# Patient Record
Sex: FEMALE | Race: WHITE | NOT HISPANIC OR LATINO | Employment: FULL TIME | RURAL
[De-identification: names, ages, dates, MRNs, and addresses within clinical notes are randomized per-mention and may not be internally consistent; named-entity substitution may affect disease eponyms.]

---

## 2018-04-13 LAB — PAP RECOMMENDATION EXT: NORMAL

## 2020-10-05 LAB — NONINV COLON CA DNA+OCC BLD SCRN STL QL: NEGATIVE

## 2020-10-07 ENCOUNTER — HISTORICAL (OUTPATIENT)
Dept: ADMINISTRATIVE | Facility: HOSPITAL | Age: 60
End: 2020-10-07

## 2020-10-07 LAB — HM MAMMOGRAM: NORMAL

## 2020-10-19 ENCOUNTER — HISTORICAL (OUTPATIENT)
Dept: ADMINISTRATIVE | Facility: HOSPITAL | Age: 60
End: 2020-10-19

## 2020-10-20 LAB
AMPHET UR QL SCN: POSITIVE
BARBITURATES UR QL SCN: NEGATIVE
BENZODIAZ METAB UR QL SCN: NEGATIVE
BUPRENORPHINE UR QL SCN: NEGATIVE
COCAINE UR QL SCN: NEGATIVE
MDA UR QL SCN: NEGATIVE
METHADONE UR QL SCN: NEGATIVE
METHAMPHET UR QL SCN: NEGATIVE
OPIATES UR QL SCN: NEGATIVE
OXYCODONE UR QL SCN: NEGATIVE
PCP UR QL SCN: NEGATIVE
TEMPERATURE, URINE: 94 DEGREES (ref 90–100)
THC UR QL SCN: NEGATIVE
VALIDITY TESTS, URINE: ABNORMAL

## 2021-02-23 ENCOUNTER — HISTORICAL (OUTPATIENT)
Dept: ADMINISTRATIVE | Facility: HOSPITAL | Age: 61
End: 2021-02-23

## 2021-05-14 VITALS
BODY MASS INDEX: 27.13 KG/M2 | HEIGHT: 63 IN | WEIGHT: 153.13 LBS | SYSTOLIC BLOOD PRESSURE: 112 MMHG | RESPIRATION RATE: 20 BRPM | HEART RATE: 95 BPM | DIASTOLIC BLOOD PRESSURE: 82 MMHG | TEMPERATURE: 97 F | OXYGEN SATURATION: 98 %

## 2021-05-14 RX ORDER — HYDROXYZINE PAMOATE 25 MG/1
25 CAPSULE ORAL 2 TIMES DAILY PRN
COMMUNITY
Start: 2016-02-04 | End: 2021-11-08 | Stop reason: SDUPTHER

## 2021-05-14 RX ORDER — DOXEPIN HYDROCHLORIDE 10 MG/1
10-20 CAPSULE ORAL NIGHTLY
COMMUNITY
Start: 2020-08-18 | End: 2021-11-08

## 2021-05-14 RX ORDER — TIZANIDINE 4 MG/1
4 TABLET ORAL NIGHTLY
COMMUNITY
Start: 2011-11-23 | End: 2021-11-08 | Stop reason: SDUPTHER

## 2021-05-14 RX ORDER — FLUTICASONE PROPIONATE 50 MCG
1 SPRAY, SUSPENSION (ML) NASAL 2 TIMES DAILY
COMMUNITY
Start: 2020-03-06 | End: 2022-05-18 | Stop reason: SDUPTHER

## 2021-05-14 RX ORDER — ESCITALOPRAM OXALATE 10 MG/1
10 TABLET ORAL DAILY
COMMUNITY
Start: 2016-02-04 | End: 2021-11-08 | Stop reason: SDUPTHER

## 2021-05-14 RX ORDER — CETIRIZINE HYDROCHLORIDE 10 MG/1
10 TABLET ORAL DAILY
COMMUNITY
Start: 2020-04-06 | End: 2022-05-18 | Stop reason: SDUPTHER

## 2021-05-14 RX ORDER — HYDROCHLOROTHIAZIDE 12.5 MG/1
12.5 TABLET ORAL DAILY
COMMUNITY
Start: 2019-06-18 | End: 2024-04-02

## 2021-05-14 RX ORDER — OMEPRAZOLE 20 MG/1
20 CAPSULE, DELAYED RELEASE ORAL 2 TIMES DAILY
COMMUNITY
Start: 2015-04-02 | End: 2021-11-08 | Stop reason: SDUPTHER

## 2021-05-14 RX ORDER — CELECOXIB 200 MG/1
200 CAPSULE ORAL DAILY
COMMUNITY
Start: 2017-07-11 | End: 2021-11-08 | Stop reason: SDUPTHER

## 2021-05-14 RX ORDER — SUMATRIPTAN SUCCINATE 100 MG/1
100 TABLET ORAL
COMMUNITY
Start: 2011-07-19 | End: 2021-11-08 | Stop reason: SDUPTHER

## 2021-05-14 RX ORDER — ALBUTEROL SULFATE 90 UG/1
2 AEROSOL, METERED RESPIRATORY (INHALATION) EVERY 6 HOURS PRN
COMMUNITY
Start: 2015-11-09

## 2021-06-09 ENCOUNTER — OFFICE VISIT (OUTPATIENT)
Dept: FAMILY MEDICINE | Facility: CLINIC | Age: 61
End: 2021-06-09
Payer: COMMERCIAL

## 2021-06-09 VITALS
TEMPERATURE: 97 F | OXYGEN SATURATION: 94 % | DIASTOLIC BLOOD PRESSURE: 70 MMHG | SYSTOLIC BLOOD PRESSURE: 122 MMHG | HEIGHT: 63 IN | RESPIRATION RATE: 22 BRPM | HEART RATE: 68 BPM | BODY MASS INDEX: 28.14 KG/M2 | WEIGHT: 158.81 LBS

## 2021-06-09 DIAGNOSIS — D22.9 ATYPICAL MOLE: Primary | ICD-10-CM

## 2021-06-09 DIAGNOSIS — R06.00 DYSPNEA, UNSPECIFIED TYPE: ICD-10-CM

## 2021-06-09 LAB
BASOPHILS # BLD AUTO: 0.03 K/UL (ref 0–0.2)
BASOPHILS NFR BLD AUTO: 0.5 % (ref 0–1)
DIFFERENTIAL METHOD BLD: ABNORMAL
EOSINOPHIL # BLD AUTO: 0.13 K/UL (ref 0–0.5)
EOSINOPHIL NFR BLD AUTO: 2.1 % (ref 1–4)
ERYTHROCYTE [DISTWIDTH] IN BLOOD BY AUTOMATED COUNT: 14.2 % (ref 11.5–14.5)
HCT VFR BLD AUTO: 40.1 % (ref 38–47)
HGB BLD-MCNC: 13 G/DL (ref 12–16)
IMM GRANULOCYTES # BLD AUTO: 0.01 K/UL (ref 0–0.04)
IMM GRANULOCYTES NFR BLD: 0.2 % (ref 0–0.4)
LYMPHOCYTES # BLD AUTO: 1.6 K/UL (ref 1–4.8)
LYMPHOCYTES NFR BLD AUTO: 25.9 % (ref 27–41)
MCH RBC QN AUTO: 28.2 PG (ref 27–31)
MCHC RBC AUTO-ENTMCNC: 32.4 G/DL (ref 32–36)
MCV RBC AUTO: 87 FL (ref 80–96)
MONOCYTES # BLD AUTO: 0.44 K/UL (ref 0–0.8)
MONOCYTES NFR BLD AUTO: 7.1 % (ref 2–6)
MPC BLD CALC-MCNC: 11.8 FL (ref 9.4–12.4)
NEUTROPHILS # BLD AUTO: 3.97 K/UL (ref 1.8–7.7)
NEUTROPHILS NFR BLD AUTO: 64.2 % (ref 53–65)
NRBC # BLD AUTO: 0 X10E3/UL
NRBC, AUTO (.00): 0 %
PLATELET # BLD AUTO: 230 K/UL (ref 150–400)
RBC # BLD AUTO: 4.61 M/UL (ref 4.2–5.4)
WBC # BLD AUTO: 6.18 K/UL (ref 4.5–11)

## 2021-06-09 PROCEDURE — 85025 CBC WITH DIFFERENTIAL: ICD-10-PCS | Mod: ,,, | Performed by: CLINICAL MEDICAL LABORATORY

## 2021-06-09 PROCEDURE — 84436 ASSAY OF TOTAL THYROXINE: CPT | Mod: ,,, | Performed by: CLINICAL MEDICAL LABORATORY

## 2021-06-09 PROCEDURE — 99214 PR OFFICE/OUTPT VISIT, EST, LEVL IV, 30-39 MIN: ICD-10-PCS | Mod: ,,, | Performed by: FAMILY MEDICINE

## 2021-06-09 PROCEDURE — 85025 COMPLETE CBC W/AUTO DIFF WBC: CPT | Mod: ,,, | Performed by: CLINICAL MEDICAL LABORATORY

## 2021-06-09 PROCEDURE — 93000 PR ELECTROCARDIOGRAM, COMPLETE: ICD-10-PCS | Mod: ,,, | Performed by: FAMILY MEDICINE

## 2021-06-09 PROCEDURE — 80061 LIPID PANEL: ICD-10-PCS | Mod: ,,, | Performed by: CLINICAL MEDICAL LABORATORY

## 2021-06-09 PROCEDURE — 3008F PR BODY MASS INDEX (BMI) DOCUMENTED: ICD-10-PCS | Mod: CPTII,,, | Performed by: FAMILY MEDICINE

## 2021-06-09 PROCEDURE — 84436 T4: ICD-10-PCS | Mod: ,,, | Performed by: CLINICAL MEDICAL LABORATORY

## 2021-06-09 PROCEDURE — 93000 ELECTROCARDIOGRAM COMPLETE: CPT | Mod: ,,, | Performed by: FAMILY MEDICINE

## 2021-06-09 PROCEDURE — 99214 OFFICE O/P EST MOD 30 MIN: CPT | Mod: ,,, | Performed by: FAMILY MEDICINE

## 2021-06-09 PROCEDURE — 80061 LIPID PANEL: CPT | Mod: ,,, | Performed by: CLINICAL MEDICAL LABORATORY

## 2021-06-09 PROCEDURE — 3008F BODY MASS INDEX DOCD: CPT | Mod: CPTII,,, | Performed by: FAMILY MEDICINE

## 2021-06-10 LAB
ALBUMIN SERPL BCP-MCNC: 4 G/DL (ref 3.5–5)
ALBUMIN/GLOB SERPL: 1.5 {RATIO}
ALP SERPL-CCNC: 147 U/L (ref 46–118)
ALT SERPL W P-5'-P-CCNC: 28 U/L (ref 13–56)
ANION GAP SERPL CALCULATED.3IONS-SCNC: 11 MMOL/L (ref 7–16)
AST SERPL W P-5'-P-CCNC: 21 U/L (ref 15–37)
BILIRUB SERPL-MCNC: 0.3 MG/DL (ref 0–1.2)
BUN SERPL-MCNC: 11 MG/DL (ref 7–18)
BUN/CREAT SERPL: 10 (ref 6–20)
CALCIUM SERPL-MCNC: 9.1 MG/DL (ref 8.5–10.1)
CHLORIDE SERPL-SCNC: 106 MMOL/L (ref 98–107)
CHOLEST SERPL-MCNC: 199 MG/DL (ref 0–200)
CHOLEST/HDLC SERPL: 3 {RATIO}
CO2 SERPL-SCNC: 28 MMOL/L (ref 21–32)
CREAT SERPL-MCNC: 1.11 MG/DL (ref 0.55–1.02)
GLOBULIN SER-MCNC: 2.6 G/DL (ref 2–4)
GLUCOSE SERPL-MCNC: 77 MG/DL (ref 74–106)
HDLC SERPL-MCNC: 67 MG/DL (ref 40–60)
LDLC SERPL CALC-MCNC: 93 MG/DL
LDLC/HDLC SERPL: 1.4 {RATIO}
NONHDLC SERPL-MCNC: 132 MG/DL
POTASSIUM SERPL-SCNC: 4.3 MMOL/L (ref 3.5–5.1)
PROT SERPL-MCNC: 6.6 G/DL (ref 6.4–8.2)
SODIUM SERPL-SCNC: 141 MMOL/L (ref 136–145)
T4 SERPL-MCNC: 6.8 ΜG/DL (ref 4.8–13.9)
TRIGL SERPL-MCNC: 196 MG/DL (ref 35–150)
TSH SERPL DL<=0.005 MIU/L-ACNC: 1.62 UIU/ML (ref 0.36–3.74)
VLDLC SERPL-MCNC: 39 MG/DL

## 2021-06-24 ENCOUNTER — OFFICE VISIT (OUTPATIENT)
Dept: FAMILY MEDICINE | Facility: CLINIC | Age: 61
End: 2021-06-24
Payer: COMMERCIAL

## 2021-06-24 VITALS
OXYGEN SATURATION: 94 % | HEIGHT: 63 IN | HEART RATE: 72 BPM | DIASTOLIC BLOOD PRESSURE: 72 MMHG | SYSTOLIC BLOOD PRESSURE: 122 MMHG | WEIGHT: 158 LBS | TEMPERATURE: 97 F | BODY MASS INDEX: 28 KG/M2 | RESPIRATION RATE: 22 BRPM

## 2021-06-24 DIAGNOSIS — R06.00 DYSPNEA, UNSPECIFIED TYPE: Primary | ICD-10-CM

## 2021-06-24 DIAGNOSIS — R07.9 CHEST PAIN, UNSPECIFIED TYPE: ICD-10-CM

## 2021-06-24 PROCEDURE — 3008F BODY MASS INDEX DOCD: CPT | Mod: CPTII,,, | Performed by: FAMILY MEDICINE

## 2021-06-24 PROCEDURE — 99215 PR OFFICE/OUTPT VISIT, EST, LEVL V, 40-54 MIN: ICD-10-PCS | Mod: ,,, | Performed by: FAMILY MEDICINE

## 2021-06-24 PROCEDURE — 99215 OFFICE O/P EST HI 40 MIN: CPT | Mod: ,,, | Performed by: FAMILY MEDICINE

## 2021-06-24 PROCEDURE — 93000 PR ELECTROCARDIOGRAM, COMPLETE: ICD-10-PCS | Mod: ,,, | Performed by: FAMILY MEDICINE

## 2021-06-24 PROCEDURE — 3008F PR BODY MASS INDEX (BMI) DOCUMENTED: ICD-10-PCS | Mod: CPTII,,, | Performed by: FAMILY MEDICINE

## 2021-06-24 PROCEDURE — 93000 ELECTROCARDIOGRAM COMPLETE: CPT | Mod: ,,, | Performed by: FAMILY MEDICINE

## 2021-06-24 RX ORDER — METOPROLOL TARTRATE 25 MG/1
25 TABLET, FILM COATED ORAL DAILY
Qty: 30 TABLET | Refills: 11 | Status: SHIPPED | OUTPATIENT
Start: 2021-06-24 | End: 2021-11-08

## 2021-06-24 RX ORDER — BUDESONIDE, GLYCOPYRROLATE, AND FORMOTEROL FUMARATE 160; 9; 4.8 UG/1; UG/1; UG/1
2 AEROSOL, METERED RESPIRATORY (INHALATION) DAILY
COMMUNITY
End: 2021-11-08 | Stop reason: SDUPTHER

## 2021-06-25 PROBLEM — R07.9 CHEST PAIN: Status: ACTIVE | Noted: 2021-06-25

## 2021-07-02 ENCOUNTER — OFFICE VISIT (OUTPATIENT)
Dept: CARDIOLOGY | Facility: CLINIC | Age: 61
End: 2021-07-02
Payer: COMMERCIAL

## 2021-07-02 VITALS
BODY MASS INDEX: 28.35 KG/M2 | OXYGEN SATURATION: 95 % | SYSTOLIC BLOOD PRESSURE: 100 MMHG | HEIGHT: 63 IN | WEIGHT: 160 LBS | DIASTOLIC BLOOD PRESSURE: 60 MMHG | HEART RATE: 62 BPM

## 2021-07-02 DIAGNOSIS — R06.00 DYSPNEA, UNSPECIFIED TYPE: ICD-10-CM

## 2021-07-02 DIAGNOSIS — R06.02 SHORTNESS OF BREATH: Primary | ICD-10-CM

## 2021-07-02 DIAGNOSIS — R07.9 CHEST PAIN, UNSPECIFIED TYPE: ICD-10-CM

## 2021-07-02 PROCEDURE — 99204 PR OFFICE/OUTPT VISIT, NEW, LEVL IV, 45-59 MIN: ICD-10-PCS | Mod: S$PBB,,, | Performed by: INTERNAL MEDICINE

## 2021-07-02 PROCEDURE — 99204 OFFICE O/P NEW MOD 45 MIN: CPT | Mod: S$PBB,,, | Performed by: INTERNAL MEDICINE

## 2021-07-02 PROCEDURE — 3008F BODY MASS INDEX DOCD: CPT | Mod: CPTII,,, | Performed by: INTERNAL MEDICINE

## 2021-07-02 PROCEDURE — 99215 OFFICE O/P EST HI 40 MIN: CPT | Mod: PBBFAC | Performed by: INTERNAL MEDICINE

## 2021-07-02 PROCEDURE — 3008F PR BODY MASS INDEX (BMI) DOCUMENTED: ICD-10-PCS | Mod: CPTII,,, | Performed by: INTERNAL MEDICINE

## 2021-07-09 ENCOUNTER — OFFICE VISIT (OUTPATIENT)
Dept: FAMILY MEDICINE | Facility: CLINIC | Age: 61
End: 2021-07-09
Payer: COMMERCIAL

## 2021-07-09 VITALS
OXYGEN SATURATION: 95 % | HEIGHT: 63 IN | SYSTOLIC BLOOD PRESSURE: 112 MMHG | TEMPERATURE: 97 F | BODY MASS INDEX: 28 KG/M2 | WEIGHT: 158 LBS | RESPIRATION RATE: 20 BRPM | HEART RATE: 84 BPM | DIASTOLIC BLOOD PRESSURE: 88 MMHG

## 2021-07-09 DIAGNOSIS — S69.91XA FISH HOOK INJURY OF FINGER OF RIGHT HAND, INITIAL ENCOUNTER: ICD-10-CM

## 2021-07-09 DIAGNOSIS — R11.0 NAUSEA: Primary | ICD-10-CM

## 2021-07-09 PROCEDURE — 90715 TDAP VACCINE GREATER THAN OR EQUAL TO 7YO IM: ICD-10-PCS | Mod: ,,, | Performed by: FAMILY MEDICINE

## 2021-07-09 PROCEDURE — 3008F PR BODY MASS INDEX (BMI) DOCUMENTED: ICD-10-PCS | Mod: CPTII,,, | Performed by: FAMILY MEDICINE

## 2021-07-09 PROCEDURE — 1125F PR PAIN SEVERITY QUANTIFIED, PAIN PRESENT: ICD-10-PCS | Mod: ,,, | Performed by: FAMILY MEDICINE

## 2021-07-09 PROCEDURE — 96372 PR INJECTION,THERAP/PROPH/DIAG2ST, IM OR SUBCUT: ICD-10-PCS | Mod: 59,,, | Performed by: FAMILY MEDICINE

## 2021-07-09 PROCEDURE — 99213 PR OFFICE/OUTPT VISIT, EST, LEVL III, 20-29 MIN: ICD-10-PCS | Mod: 25,,, | Performed by: FAMILY MEDICINE

## 2021-07-09 PROCEDURE — 1125F AMNT PAIN NOTED PAIN PRSNT: CPT | Mod: ,,, | Performed by: FAMILY MEDICINE

## 2021-07-09 PROCEDURE — 96372 THER/PROPH/DIAG INJ SC/IM: CPT | Mod: 59,,, | Performed by: FAMILY MEDICINE

## 2021-07-09 PROCEDURE — 90715 TDAP VACCINE 7 YRS/> IM: CPT | Mod: ,,, | Performed by: FAMILY MEDICINE

## 2021-07-09 PROCEDURE — 90471 TDAP VACCINE GREATER THAN OR EQUAL TO 7YO IM: ICD-10-PCS | Mod: ,,, | Performed by: FAMILY MEDICINE

## 2021-07-09 PROCEDURE — 3008F BODY MASS INDEX DOCD: CPT | Mod: CPTII,,, | Performed by: FAMILY MEDICINE

## 2021-07-09 PROCEDURE — 90471 IMMUNIZATION ADMIN: CPT | Mod: ,,, | Performed by: FAMILY MEDICINE

## 2021-07-09 PROCEDURE — 99213 OFFICE O/P EST LOW 20 MIN: CPT | Mod: 25,,, | Performed by: FAMILY MEDICINE

## 2021-07-09 RX ORDER — ONDANSETRON 2 MG/ML
4 INJECTION INTRAMUSCULAR; INTRAVENOUS
Status: DISCONTINUED | OUTPATIENT
Start: 2021-07-09 | End: 2021-07-09

## 2021-07-09 RX ORDER — ONDANSETRON 2 MG/ML
4 INJECTION INTRAMUSCULAR; INTRAVENOUS
Status: COMPLETED | OUTPATIENT
Start: 2021-07-09 | End: 2021-07-09

## 2021-07-09 RX ORDER — CLINDAMYCIN HYDROCHLORIDE 300 MG/1
300 CAPSULE ORAL EVERY 8 HOURS
Qty: 21 CAPSULE | Refills: 0 | Status: ON HOLD | OUTPATIENT
Start: 2021-07-09 | End: 2021-09-17

## 2021-07-09 RX ADMIN — ONDANSETRON 4 MG: 2 INJECTION INTRAMUSCULAR; INTRAVENOUS at 12:07

## 2021-07-12 ENCOUNTER — OFFICE VISIT (OUTPATIENT)
Dept: DERMATOLOGY | Facility: CLINIC | Age: 61
End: 2021-07-12
Payer: COMMERCIAL

## 2021-07-12 VITALS — WEIGHT: 158.06 LBS | BODY MASS INDEX: 28 KG/M2 | HEIGHT: 63 IN | RESPIRATION RATE: 16 BRPM

## 2021-07-12 DIAGNOSIS — L57.0 ACTINIC KERATOSIS: ICD-10-CM

## 2021-07-12 DIAGNOSIS — D48.9 NEOPLASM OF UNCERTAIN BEHAVIOR: Primary | ICD-10-CM

## 2021-07-12 DIAGNOSIS — D22.9 BENIGN NEVUS: ICD-10-CM

## 2021-07-12 PROCEDURE — 3008F PR BODY MASS INDEX (BMI) DOCUMENTED: ICD-10-PCS | Mod: CPTII,,, | Performed by: STUDENT IN AN ORGANIZED HEALTH CARE EDUCATION/TRAINING PROGRAM

## 2021-07-12 PROCEDURE — 99203 OFFICE O/P NEW LOW 30 MIN: CPT | Mod: 25,,, | Performed by: STUDENT IN AN ORGANIZED HEALTH CARE EDUCATION/TRAINING PROGRAM

## 2021-07-12 PROCEDURE — 11102 TANGNTL BX SKIN SINGLE LES: CPT | Mod: ,,, | Performed by: STUDENT IN AN ORGANIZED HEALTH CARE EDUCATION/TRAINING PROGRAM

## 2021-07-12 PROCEDURE — 88305 TISSUE EXAM BY PATHOLOGIST: CPT | Mod: SUR | Performed by: STUDENT IN AN ORGANIZED HEALTH CARE EDUCATION/TRAINING PROGRAM

## 2021-07-12 PROCEDURE — 17000 DESTRUCT PREMALG LESION: CPT | Mod: XU,51,, | Performed by: STUDENT IN AN ORGANIZED HEALTH CARE EDUCATION/TRAINING PROGRAM

## 2021-07-12 PROCEDURE — 17000 PR DESTRUCTION(LASER SURGERY,CRYOSURGERY,CHEMOSURGERY),PREMALIGNANT LESIONS,FIRST LESION: ICD-10-PCS | Mod: XU,51,, | Performed by: STUDENT IN AN ORGANIZED HEALTH CARE EDUCATION/TRAINING PROGRAM

## 2021-07-12 PROCEDURE — 88305 TISSUE EXAM BY PATHOLOGIST: CPT | Mod: 26,,, | Performed by: PATHOLOGY

## 2021-07-12 PROCEDURE — 88342 IMHCHEM/IMCYTCHM 1ST ANTB: CPT | Mod: 26,,, | Performed by: PATHOLOGY

## 2021-07-12 PROCEDURE — 11102 PR TANGENTIAL BIOPSY, SKIN, SINGLE LESION: ICD-10-PCS | Mod: ,,, | Performed by: STUDENT IN AN ORGANIZED HEALTH CARE EDUCATION/TRAINING PROGRAM

## 2021-07-12 PROCEDURE — 3008F BODY MASS INDEX DOCD: CPT | Mod: CPTII,,, | Performed by: STUDENT IN AN ORGANIZED HEALTH CARE EDUCATION/TRAINING PROGRAM

## 2021-07-12 PROCEDURE — 99203 PR OFFICE/OUTPT VISIT, NEW, LEVL III, 30-44 MIN: ICD-10-PCS | Mod: 25,,, | Performed by: STUDENT IN AN ORGANIZED HEALTH CARE EDUCATION/TRAINING PROGRAM

## 2021-07-12 PROCEDURE — 88305 PATHOLOGY, DERMATOLOGY: ICD-10-PCS | Mod: 26,,, | Performed by: PATHOLOGY

## 2021-07-12 PROCEDURE — 88342 PATHOLOGY, DERMATOLOGY: ICD-10-PCS | Mod: 26,,, | Performed by: PATHOLOGY

## 2021-07-15 ENCOUNTER — HOSPITAL ENCOUNTER (OUTPATIENT)
Dept: RADIOLOGY | Facility: HOSPITAL | Age: 61
Discharge: HOME | End: 2021-07-15
Attending: INTERNAL MEDICINE
Payer: COMMERCIAL

## 2021-07-15 ENCOUNTER — HOSPITAL ENCOUNTER (OUTPATIENT)
Dept: CARDIOLOGY | Facility: HOSPITAL | Age: 61
Discharge: HOME | End: 2021-07-15
Attending: INTERNAL MEDICINE
Payer: COMMERCIAL

## 2021-07-15 VITALS — WEIGHT: 158 LBS | BODY MASS INDEX: 28.44 KG/M2

## 2021-07-15 DIAGNOSIS — R07.9 CHEST PAIN, UNSPECIFIED TYPE: ICD-10-CM

## 2021-07-15 DIAGNOSIS — R06.02 SHORTNESS OF BREATH: ICD-10-CM

## 2021-07-15 LAB
AORTIC ROOT ANNULUS: 2.6 CM
AORTIC VALVE CUSP SEPERATION: 2.15 CM
AV INDEX (PROSTH): 0.67
AV MEAN GRADIENT: 2 MMHG
AV PEAK GRADIENT: 3 MMHG
AV VALVE AREA: 2.09 CM2
AV VELOCITY RATIO: 1
CV ECHO LV RWT: 0.52 CM
CV STRESS BASE HR: 61 BPM
DHEA SERPL-MCNC: NORMAL
DIASTOLIC BLOOD PRESSURE: 85 MMHG
DOP CALC AO PEAK VEL: 0.8 M/S
DOP CALC AO VTI: 18 CM
DOP CALC LVOT AREA: 3.1 CM2
DOP CALC LVOT DIAMETER: 2 CM
DOP CALC LVOT PEAK VEL: 0.8 M/S
DOP CALC LVOT STROKE VOLUME: 37.68 CM3
DOP CALCLVOT PEAK VEL VTI: 12 CM
E WAVE DECELERATION TIME: 183 MSEC
ECHO EF ESTIMATED: 60 %
ECHO LV POSTERIOR WALL: 1.11 CM (ref 0.6–1.1)
EJECTION FRACTION: 60 %
ESTROGEN SERPL-MCNC: NORMAL PG/ML
FRACTIONAL SHORTENING: 31 % (ref 28–44)
INTERVENTRICULAR SEPTUM: 0.98 CM (ref 0.6–1.1)
IVC OSTIUM: 0.6 CM
LAB AP GROSS DESCRIPTION: NORMAL
LAB AP LABORATORY NOTES: NORMAL
LAB AP SPEC A DDX: NORMAL
LAB AP SPEC A MORPHOLOGY: NORMAL
LAB AP SPEC A PROCEDURE: NORMAL
LEFT ATRIUM SIZE: 3.4 CM
LEFT INTERNAL DIMENSION IN SYSTOLE: 2.94 CM (ref 2.1–4)
LEFT VENTRICULAR INTERNAL DIMENSION IN DIASTOLE: 4.27 CM (ref 3.5–6)
LEFT VENTRICULAR MASS: 149.86 G
LVOT MG: 1 MMHG
MV PEAK E VEL: 0.5 M/S
OHS CV CPX 1 MINUTE RECOVERY HEART RATE: 92 BPM
OHS CV CPX 85 PERCENT MAX PREDICTED HEART RATE MALE: 131
OHS CV CPX ESTIMATED METS: 7
OHS CV CPX MAX PREDICTED HEART RATE: 154
OHS CV CPX PATIENT IS FEMALE: 1
OHS CV CPX PATIENT IS MALE: 0
OHS CV CPX PEAK DIASTOLIC BLOOD PRESSURE: 101 MMHG
OHS CV CPX PEAK HEAR RATE: 141 BPM
OHS CV CPX PEAK RATE PRESSURE PRODUCT: NORMAL
OHS CV CPX PEAK SYSTOLIC BLOOD PRESSURE: 176 MMHG
OHS CV CPX PERCENT MAX PREDICTED HEART RATE ACHIEVED: 92
OHS CV CPX RATE PRESSURE PRODUCT PRESENTING: 8357
PISA TR MAX VEL: 2.5 M/S
RA MAJOR: 3.9 CM
RA PRESSURE: 3 MMHG
RIGHT VENTRICULAR END-DIASTOLIC DIMENSION: 3.5 CM
STRESS ECHO POST EXERCISE DUR MIN: 5 MINUTES
STRESS ECHO POST EXERCISE DUR SEC: 18 SECONDS
SYSTOLIC BLOOD PRESSURE: 137 MMHG
T3RU NFR SERPL: NORMAL %
TR MAX PG: 25 MMHG
TRICUSPID ANNULAR PLANE SYSTOLIC EXCURSION: 2.2 CM
TV REST PULMONARY ARTERY PRESSURE: 28 MMHG

## 2021-07-15 PROCEDURE — 93018 NUCLEAR STRESS TEST (CUPID ONLY): ICD-10-PCS | Mod: ,,, | Performed by: INTERNAL MEDICINE

## 2021-07-15 PROCEDURE — 93016 NUCLEAR STRESS TEST (CUPID ONLY): ICD-10-PCS | Mod: ,,, | Performed by: NURSE PRACTITIONER

## 2021-07-15 PROCEDURE — 93306 TTE W/DOPPLER COMPLETE: CPT | Mod: 26,,, | Performed by: INTERNAL MEDICINE

## 2021-07-15 PROCEDURE — 78452 HT MUSCLE IMAGE SPECT MULT: CPT | Mod: TC

## 2021-07-15 PROCEDURE — 93016 CV STRESS TEST SUPVJ ONLY: CPT | Mod: ,,, | Performed by: NURSE PRACTITIONER

## 2021-07-15 PROCEDURE — 93306 ECHO (CUPID ONLY): ICD-10-PCS | Mod: 26,,, | Performed by: INTERNAL MEDICINE

## 2021-07-15 PROCEDURE — 93306 TTE W/DOPPLER COMPLETE: CPT

## 2021-07-15 PROCEDURE — A9500 TC99M SESTAMIBI: HCPCS

## 2021-07-15 PROCEDURE — 93017 CV STRESS TEST TRACING ONLY: CPT

## 2021-07-15 PROCEDURE — 93018 CV STRESS TEST I&R ONLY: CPT | Mod: ,,, | Performed by: INTERNAL MEDICINE

## 2021-07-15 PROCEDURE — 78452 NM MYOCARDIAL PERFUSION SPECT MULTI STUDY: ICD-10-PCS | Mod: 26,,, | Performed by: INTERNAL MEDICINE

## 2021-07-15 PROCEDURE — 78452 HT MUSCLE IMAGE SPECT MULT: CPT | Mod: 26,,, | Performed by: INTERNAL MEDICINE

## 2021-07-20 ENCOUNTER — CLINICAL SUPPORT (OUTPATIENT)
Dept: PULMONOLOGY | Facility: HOSPITAL | Age: 61
End: 2021-07-20
Attending: INTERNAL MEDICINE
Payer: COMMERCIAL

## 2021-07-20 DIAGNOSIS — R06.00 DYSPNEA, UNSPECIFIED TYPE: ICD-10-CM

## 2021-07-20 PROCEDURE — 94726 PULM FUNCT TST PLETHYSMOGRAP: ICD-10-PCS | Mod: 26,S$PBB,, | Performed by: INTERNAL MEDICINE

## 2021-07-20 PROCEDURE — 94060 PR EVAL OF BRONCHOSPASM: ICD-10-PCS | Mod: 26,S$PBB,, | Performed by: INTERNAL MEDICINE

## 2021-07-20 PROCEDURE — 94726 PLETHYSMOGRAPHY LUNG VOLUMES: CPT

## 2021-07-20 PROCEDURE — 94729 DIFFUSING CAPACITY: CPT | Mod: 26,S$PBB,, | Performed by: INTERNAL MEDICINE

## 2021-07-20 PROCEDURE — 94060 EVALUATION OF WHEEZING: CPT

## 2021-07-20 PROCEDURE — 94060 EVALUATION OF WHEEZING: CPT | Mod: 26,S$PBB,, | Performed by: INTERNAL MEDICINE

## 2021-07-20 PROCEDURE — 94726 PLETHYSMOGRAPHY LUNG VOLUMES: CPT | Mod: 26,S$PBB,, | Performed by: INTERNAL MEDICINE

## 2021-07-20 PROCEDURE — 94729 DIFFUSING CAPACITY: CPT

## 2021-07-20 PROCEDURE — 94729 PR C02/MEMBANE DIFFUSE CAPACITY: ICD-10-PCS | Mod: 26,S$PBB,, | Performed by: INTERNAL MEDICINE

## 2021-07-27 LAB
EKG 12-LEAD: ABNORMAL
PR INTERVAL: ABNORMAL
PRT AXES: ABNORMAL
QRS DURATION: ABNORMAL
QT/QTC: ABNORMAL
VENTRICULAR RATE: ABNORMAL

## 2021-08-02 LAB
EKG 12-LEAD: NORMAL
PR INTERVAL: NORMAL
PRT AXES: NORMAL
QRS DURATION: NORMAL
QT/QTC: NORMAL
VENTRICULAR RATE: NORMAL

## 2021-08-04 ENCOUNTER — OFFICE VISIT (OUTPATIENT)
Dept: FAMILY MEDICINE | Facility: CLINIC | Age: 61
End: 2021-08-04
Payer: COMMERCIAL

## 2021-08-04 VITALS
HEART RATE: 79 BPM | OXYGEN SATURATION: 95 % | BODY MASS INDEX: 28.32 KG/M2 | RESPIRATION RATE: 18 BRPM | TEMPERATURE: 98 F | HEIGHT: 63 IN | SYSTOLIC BLOOD PRESSURE: 114 MMHG | WEIGHT: 159.81 LBS | DIASTOLIC BLOOD PRESSURE: 82 MMHG

## 2021-08-04 DIAGNOSIS — R06.02 SOB (SHORTNESS OF BREATH): Primary | ICD-10-CM

## 2021-08-04 PROCEDURE — 3074F PR MOST RECENT SYSTOLIC BLOOD PRESSURE < 130 MM HG: ICD-10-PCS | Mod: CPTII,,, | Performed by: FAMILY MEDICINE

## 2021-08-04 PROCEDURE — 99214 OFFICE O/P EST MOD 30 MIN: CPT | Mod: ,,, | Performed by: FAMILY MEDICINE

## 2021-08-04 PROCEDURE — 3008F PR BODY MASS INDEX (BMI) DOCUMENTED: ICD-10-PCS | Mod: CPTII,,, | Performed by: FAMILY MEDICINE

## 2021-08-04 PROCEDURE — 3079F PR MOST RECENT DIASTOLIC BLOOD PRESSURE 80-89 MM HG: ICD-10-PCS | Mod: CPTII,,, | Performed by: FAMILY MEDICINE

## 2021-08-04 PROCEDURE — 1159F PR MEDICATION LIST DOCUMENTED IN MEDICAL RECORD: ICD-10-PCS | Mod: CPTII,,, | Performed by: FAMILY MEDICINE

## 2021-08-04 PROCEDURE — 3074F SYST BP LT 130 MM HG: CPT | Mod: CPTII,,, | Performed by: FAMILY MEDICINE

## 2021-08-04 PROCEDURE — 1159F MED LIST DOCD IN RCRD: CPT | Mod: CPTII,,, | Performed by: FAMILY MEDICINE

## 2021-08-04 PROCEDURE — 3008F BODY MASS INDEX DOCD: CPT | Mod: CPTII,,, | Performed by: FAMILY MEDICINE

## 2021-08-04 PROCEDURE — 99214 PR OFFICE/OUTPT VISIT, EST, LEVL IV, 30-39 MIN: ICD-10-PCS | Mod: ,,, | Performed by: FAMILY MEDICINE

## 2021-08-04 PROCEDURE — 3079F DIAST BP 80-89 MM HG: CPT | Mod: CPTII,,, | Performed by: FAMILY MEDICINE

## 2021-08-27 ENCOUNTER — OFFICE VISIT (OUTPATIENT)
Dept: CARDIOLOGY | Facility: CLINIC | Age: 61
End: 2021-08-27
Payer: COMMERCIAL

## 2021-08-27 VITALS
DIASTOLIC BLOOD PRESSURE: 70 MMHG | HEIGHT: 62 IN | BODY MASS INDEX: 29.63 KG/M2 | HEART RATE: 63 BPM | OXYGEN SATURATION: 98 % | SYSTOLIC BLOOD PRESSURE: 106 MMHG | WEIGHT: 161 LBS

## 2021-08-27 DIAGNOSIS — Z01.812 ENCOUNTER FOR PREPROCEDURAL LABORATORY EXAMINATION: ICD-10-CM

## 2021-08-27 DIAGNOSIS — R06.02 SOB (SHORTNESS OF BREATH): ICD-10-CM

## 2021-08-27 DIAGNOSIS — R07.9 CHEST PAIN, UNSPECIFIED TYPE: Primary | ICD-10-CM

## 2021-08-27 PROCEDURE — 3078F DIAST BP <80 MM HG: CPT | Mod: CPTII,,, | Performed by: INTERNAL MEDICINE

## 2021-08-27 PROCEDURE — 99214 OFFICE O/P EST MOD 30 MIN: CPT | Mod: S$PBB,,, | Performed by: INTERNAL MEDICINE

## 2021-08-27 PROCEDURE — 3008F PR BODY MASS INDEX (BMI) DOCUMENTED: ICD-10-PCS | Mod: CPTII,,, | Performed by: INTERNAL MEDICINE

## 2021-08-27 PROCEDURE — 99214 OFFICE O/P EST MOD 30 MIN: CPT | Mod: PBBFAC | Performed by: INTERNAL MEDICINE

## 2021-08-27 PROCEDURE — 3008F BODY MASS INDEX DOCD: CPT | Mod: CPTII,,, | Performed by: INTERNAL MEDICINE

## 2021-08-27 PROCEDURE — 3078F PR MOST RECENT DIASTOLIC BLOOD PRESSURE < 80 MM HG: ICD-10-PCS | Mod: CPTII,,, | Performed by: INTERNAL MEDICINE

## 2021-08-27 PROCEDURE — 1160F PR REVIEW ALL MEDS BY PRESCRIBER/CLIN PHARMACIST DOCUMENTED: ICD-10-PCS | Mod: CPTII,,, | Performed by: INTERNAL MEDICINE

## 2021-08-27 PROCEDURE — 3074F PR MOST RECENT SYSTOLIC BLOOD PRESSURE < 130 MM HG: ICD-10-PCS | Mod: CPTII,,, | Performed by: INTERNAL MEDICINE

## 2021-08-27 PROCEDURE — 1159F MED LIST DOCD IN RCRD: CPT | Mod: CPTII,,, | Performed by: INTERNAL MEDICINE

## 2021-08-27 PROCEDURE — 1160F RVW MEDS BY RX/DR IN RCRD: CPT | Mod: CPTII,,, | Performed by: INTERNAL MEDICINE

## 2021-08-27 PROCEDURE — 3074F SYST BP LT 130 MM HG: CPT | Mod: CPTII,,, | Performed by: INTERNAL MEDICINE

## 2021-08-27 PROCEDURE — 1159F PR MEDICATION LIST DOCUMENTED IN MEDICAL RECORD: ICD-10-PCS | Mod: CPTII,,, | Performed by: INTERNAL MEDICINE

## 2021-08-27 PROCEDURE — 99214 PR OFFICE/OUTPT VISIT, EST, LEVL IV, 30-39 MIN: ICD-10-PCS | Mod: S$PBB,,, | Performed by: INTERNAL MEDICINE

## 2021-09-09 DIAGNOSIS — R07.9 CHEST PAIN, UNSPECIFIED TYPE: Primary | ICD-10-CM

## 2021-09-09 DIAGNOSIS — R06.02 SHORTNESS OF BREATH: ICD-10-CM

## 2021-09-09 RX ORDER — SODIUM CHLORIDE 0.9 % (FLUSH) 0.9 %
10 SYRINGE (ML) INJECTION
Status: CANCELLED | OUTPATIENT
Start: 2021-09-17

## 2021-09-09 RX ORDER — SODIUM CHLORIDE 9 MG/ML
INJECTION, SOLUTION INTRAVENOUS ONCE
Status: CANCELLED | OUTPATIENT
Start: 2021-09-17

## 2021-09-17 ENCOUNTER — HOSPITAL ENCOUNTER (OUTPATIENT)
Facility: HOSPITAL | Age: 61
Discharge: HOME | End: 2021-09-17
Attending: INTERNAL MEDICINE | Admitting: INTERNAL MEDICINE
Payer: COMMERCIAL

## 2021-09-17 VITALS
TEMPERATURE: 98 F | WEIGHT: 160 LBS | HEART RATE: 70 BPM | BODY MASS INDEX: 29.26 KG/M2 | OXYGEN SATURATION: 93 % | SYSTOLIC BLOOD PRESSURE: 126 MMHG | RESPIRATION RATE: 16 BRPM | DIASTOLIC BLOOD PRESSURE: 79 MMHG

## 2021-09-17 DIAGNOSIS — R07.9 CHEST PAIN, UNSPECIFIED TYPE: ICD-10-CM

## 2021-09-17 DIAGNOSIS — R06.02 SHORTNESS OF BREATH: ICD-10-CM

## 2021-09-17 PROCEDURE — C1887 CATHETER, GUIDING: HCPCS | Performed by: INTERNAL MEDICINE

## 2021-09-17 PROCEDURE — 25500020 PHARM REV CODE 255: Performed by: INTERNAL MEDICINE

## 2021-09-17 PROCEDURE — 25000003 PHARM REV CODE 250: Performed by: INTERNAL MEDICINE

## 2021-09-17 PROCEDURE — 93454 CORONARY ARTERY ANGIO S&I: CPT | Mod: 26,,, | Performed by: INTERNAL MEDICINE

## 2021-09-17 PROCEDURE — 93010 ELECTROCARDIOGRAM REPORT: CPT | Mod: 59,,, | Performed by: INTERNAL MEDICINE

## 2021-09-17 PROCEDURE — 93454 PR CATH PLACE/CORONARY ANGIO, IMG SUPER/INTERP: ICD-10-PCS | Mod: 26,,, | Performed by: INTERNAL MEDICINE

## 2021-09-17 PROCEDURE — 63600175 PHARM REV CODE 636 W HCPCS: Performed by: INTERNAL MEDICINE

## 2021-09-17 PROCEDURE — 27000080 OPTIME MED/SURG SUP & DEVICES GENERAL CLASSIFICATION: Performed by: INTERNAL MEDICINE

## 2021-09-17 PROCEDURE — 27201423 OPTIME MED/SURG SUP & DEVICES STERILE SUPPLY: Performed by: INTERNAL MEDICINE

## 2021-09-17 PROCEDURE — 93005 ELECTROCARDIOGRAM TRACING: CPT | Mod: 59

## 2021-09-17 PROCEDURE — 27100168 OPTIME MED/SURG SUP & DEVICES NON-STERILE SUPPLY: Performed by: INTERNAL MEDICINE

## 2021-09-17 PROCEDURE — 93454 CORONARY ARTERY ANGIO S&I: CPT | Performed by: INTERNAL MEDICINE

## 2021-09-17 PROCEDURE — C1894 INTRO/SHEATH, NON-LASER: HCPCS | Performed by: INTERNAL MEDICINE

## 2021-09-17 PROCEDURE — 93010 EKG 12-LEAD: ICD-10-PCS | Mod: 59,,, | Performed by: INTERNAL MEDICINE

## 2021-09-17 PROCEDURE — 27800903 OPTIME MED/SURG SUP & DEVICES OTHER IMPLANTS: Performed by: INTERNAL MEDICINE

## 2021-09-17 RX ORDER — ONDANSETRON 4 MG/1
8 TABLET, ORALLY DISINTEGRATING ORAL EVERY 8 HOURS PRN
Status: DISCONTINUED | OUTPATIENT
Start: 2021-09-17 | End: 2021-09-17 | Stop reason: HOSPADM

## 2021-09-17 RX ORDER — SODIUM CHLORIDE 0.9 % (FLUSH) 0.9 %
10 SYRINGE (ML) INJECTION
Status: DISCONTINUED | OUTPATIENT
Start: 2021-09-17 | End: 2021-09-17 | Stop reason: HOSPADM

## 2021-09-17 RX ORDER — ACETAMINOPHEN 325 MG/1
650 TABLET ORAL EVERY 4 HOURS PRN
Status: DISCONTINUED | OUTPATIENT
Start: 2021-09-17 | End: 2021-09-17 | Stop reason: HOSPADM

## 2021-09-17 RX ORDER — SODIUM CHLORIDE 450 MG/100ML
INJECTION, SOLUTION INTRAVENOUS
Status: DISCONTINUED | OUTPATIENT
Start: 2021-09-17 | End: 2021-09-17 | Stop reason: HOSPADM

## 2021-09-17 RX ORDER — SODIUM CHLORIDE 9 MG/ML
INJECTION, SOLUTION INTRAVENOUS ONCE
Status: COMPLETED | OUTPATIENT
Start: 2021-09-17 | End: 2021-09-17

## 2021-09-17 RX ORDER — MIDAZOLAM HYDROCHLORIDE 1 MG/ML
INJECTION INTRAMUSCULAR; INTRAVENOUS
Status: DISCONTINUED | OUTPATIENT
Start: 2021-09-17 | End: 2021-09-17 | Stop reason: HOSPADM

## 2021-09-17 RX ORDER — FENTANYL CITRATE 50 UG/ML
INJECTION, SOLUTION INTRAMUSCULAR; INTRAVENOUS
Status: DISCONTINUED | OUTPATIENT
Start: 2021-09-17 | End: 2021-09-17 | Stop reason: HOSPADM

## 2021-09-17 RX ORDER — LIDOCAINE HYDROCHLORIDE 10 MG/ML
INJECTION, SOLUTION EPIDURAL; INFILTRATION; INTRACAUDAL; PERINEURAL
Status: DISCONTINUED | OUTPATIENT
Start: 2021-09-17 | End: 2021-09-17 | Stop reason: HOSPADM

## 2021-09-17 RX ORDER — HEPARIN SOD,PORCINE/0.9 % NACL 1000/500ML
INTRAVENOUS SOLUTION INTRAVENOUS
Status: DISCONTINUED | OUTPATIENT
Start: 2021-09-17 | End: 2021-09-17 | Stop reason: HOSPADM

## 2021-09-17 RX ADMIN — SODIUM CHLORIDE: 9 INJECTION, SOLUTION INTRAVENOUS at 08:09

## 2021-11-08 ENCOUNTER — OFFICE VISIT (OUTPATIENT)
Dept: FAMILY MEDICINE | Facility: CLINIC | Age: 61
End: 2021-11-08
Payer: COMMERCIAL

## 2021-11-08 VITALS
OXYGEN SATURATION: 96 % | RESPIRATION RATE: 22 BRPM | HEART RATE: 72 BPM | SYSTOLIC BLOOD PRESSURE: 126 MMHG | BODY MASS INDEX: 30.55 KG/M2 | HEIGHT: 62 IN | TEMPERATURE: 96 F | WEIGHT: 166 LBS | DIASTOLIC BLOOD PRESSURE: 74 MMHG

## 2021-11-08 DIAGNOSIS — Z00.00 ROUTINE MEDICAL EXAM: Primary | ICD-10-CM

## 2021-11-08 DIAGNOSIS — Z12.31 SCREENING MAMMOGRAM FOR BREAST CANCER: ICD-10-CM

## 2021-11-08 DIAGNOSIS — E66.9 OBESITY (BMI 30.0-34.9): ICD-10-CM

## 2021-11-08 DIAGNOSIS — Z23 ENCOUNTER FOR IMMUNIZATION: ICD-10-CM

## 2021-11-08 DIAGNOSIS — Z13.220 SCREENING FOR HYPERLIPIDEMIA: ICD-10-CM

## 2021-11-08 LAB
CHOLEST SERPL-MCNC: 189 MG/DL (ref 0–200)
CHOLEST/HDLC SERPL: 3.9 {RATIO}
HDLC SERPL-MCNC: 48 MG/DL (ref 40–60)
LDLC SERPL CALC-MCNC: 92 MG/DL
LDLC/HDLC SERPL: 1.9 {RATIO}
NONHDLC SERPL-MCNC: 141 MG/DL
TRIGL SERPL-MCNC: 246 MG/DL (ref 35–150)
VLDLC SERPL-MCNC: 49 MG/DL

## 2021-11-08 PROCEDURE — 90686 IIV4 VACC NO PRSV 0.5 ML IM: CPT | Mod: ,,, | Performed by: NURSE PRACTITIONER

## 2021-11-08 PROCEDURE — 3074F PR MOST RECENT SYSTOLIC BLOOD PRESSURE < 130 MM HG: ICD-10-PCS | Mod: CPTII,,, | Performed by: NURSE PRACTITIONER

## 2021-11-08 PROCEDURE — 3078F DIAST BP <80 MM HG: CPT | Mod: CPTII,,, | Performed by: NURSE PRACTITIONER

## 2021-11-08 PROCEDURE — 1160F PR REVIEW ALL MEDS BY PRESCRIBER/CLIN PHARMACIST DOCUMENTED: ICD-10-PCS | Mod: CPTII,,, | Performed by: NURSE PRACTITIONER

## 2021-11-08 PROCEDURE — 90686 FLU VACCINE (QUAD) GREATER THAN OR EQUAL TO 3YO PRESERVATIVE FREE IM: ICD-10-PCS | Mod: ,,, | Performed by: NURSE PRACTITIONER

## 2021-11-08 PROCEDURE — 90471 FLU VACCINE (QUAD) GREATER THAN OR EQUAL TO 3YO PRESERVATIVE FREE IM: ICD-10-PCS | Mod: ,,, | Performed by: NURSE PRACTITIONER

## 2021-11-08 PROCEDURE — 99396 PREV VISIT EST AGE 40-64: CPT | Mod: 25,,, | Performed by: NURSE PRACTITIONER

## 2021-11-08 PROCEDURE — 1159F PR MEDICATION LIST DOCUMENTED IN MEDICAL RECORD: ICD-10-PCS | Mod: CPTII,,, | Performed by: NURSE PRACTITIONER

## 2021-11-08 PROCEDURE — 3008F BODY MASS INDEX DOCD: CPT | Mod: CPTII,,, | Performed by: NURSE PRACTITIONER

## 2021-11-08 PROCEDURE — 90471 IMMUNIZATION ADMIN: CPT | Mod: ,,, | Performed by: NURSE PRACTITIONER

## 2021-11-08 PROCEDURE — 3078F PR MOST RECENT DIASTOLIC BLOOD PRESSURE < 80 MM HG: ICD-10-PCS | Mod: CPTII,,, | Performed by: NURSE PRACTITIONER

## 2021-11-08 PROCEDURE — 99396 PR PREVENTIVE VISIT,EST,40-64: ICD-10-PCS | Mod: 25,,, | Performed by: NURSE PRACTITIONER

## 2021-11-08 PROCEDURE — 80061 LIPID PANEL: CPT | Mod: ,,, | Performed by: CLINICAL MEDICAL LABORATORY

## 2021-11-08 PROCEDURE — 1160F RVW MEDS BY RX/DR IN RCRD: CPT | Mod: CPTII,,, | Performed by: NURSE PRACTITIONER

## 2021-11-08 PROCEDURE — 3008F PR BODY MASS INDEX (BMI) DOCUMENTED: ICD-10-PCS | Mod: CPTII,,, | Performed by: NURSE PRACTITIONER

## 2021-11-08 PROCEDURE — 80061 LIPID PANEL: ICD-10-PCS | Mod: ,,, | Performed by: CLINICAL MEDICAL LABORATORY

## 2021-11-08 PROCEDURE — 3074F SYST BP LT 130 MM HG: CPT | Mod: CPTII,,, | Performed by: NURSE PRACTITIONER

## 2021-11-08 PROCEDURE — 1159F MED LIST DOCD IN RCRD: CPT | Mod: CPTII,,, | Performed by: NURSE PRACTITIONER

## 2021-11-08 RX ORDER — TIZANIDINE 4 MG/1
4 TABLET ORAL NIGHTLY
Qty: 30 TABLET | Refills: 5 | Status: SHIPPED | OUTPATIENT
Start: 2021-11-08 | End: 2022-05-18 | Stop reason: SDUPTHER

## 2021-11-08 RX ORDER — PHENTERMINE HYDROCHLORIDE 37.5 MG/1
37.5 TABLET ORAL
Qty: 30 TABLET | Refills: 0 | Status: SHIPPED | OUTPATIENT
Start: 2021-11-08 | End: 2021-12-08

## 2021-11-08 RX ORDER — OMEPRAZOLE 20 MG/1
20 CAPSULE, DELAYED RELEASE ORAL 2 TIMES DAILY
Qty: 30 CAPSULE | Refills: 5 | Status: SHIPPED | OUTPATIENT
Start: 2021-11-08 | End: 2022-05-18 | Stop reason: SDUPTHER

## 2021-11-08 RX ORDER — CELECOXIB 200 MG/1
200 CAPSULE ORAL DAILY
Qty: 30 CAPSULE | Refills: 5 | Status: SHIPPED | OUTPATIENT
Start: 2021-11-08 | End: 2022-05-18 | Stop reason: SDUPTHER

## 2021-11-08 RX ORDER — HYDROXYZINE PAMOATE 25 MG/1
25 CAPSULE ORAL 2 TIMES DAILY PRN
Qty: 60 CAPSULE | Refills: 5 | Status: SHIPPED | OUTPATIENT
Start: 2021-11-08 | End: 2022-05-18 | Stop reason: SDUPTHER

## 2021-11-08 RX ORDER — SUMATRIPTAN SUCCINATE 100 MG/1
100 TABLET ORAL
Qty: 9 TABLET | Refills: 5 | Status: SHIPPED | OUTPATIENT
Start: 2021-11-08 | End: 2022-05-18 | Stop reason: SDUPTHER

## 2021-11-08 RX ORDER — BUDESONIDE, GLYCOPYRROLATE, AND FORMOTEROL FUMARATE 160; 9; 4.8 UG/1; UG/1; UG/1
2 AEROSOL, METERED RESPIRATORY (INHALATION) DAILY
Qty: 10.7 G | Refills: 5 | Status: SHIPPED | OUTPATIENT
Start: 2021-11-08 | End: 2022-05-18 | Stop reason: SDUPTHER

## 2021-11-08 RX ORDER — ESCITALOPRAM OXALATE 10 MG/1
10 TABLET ORAL DAILY
Qty: 30 TABLET | Refills: 5 | Status: SHIPPED | OUTPATIENT
Start: 2021-11-08 | End: 2022-05-18 | Stop reason: SDUPTHER

## 2022-02-07 ENCOUNTER — OFFICE VISIT (OUTPATIENT)
Dept: FAMILY MEDICINE | Facility: CLINIC | Age: 62
End: 2022-02-07
Payer: COMMERCIAL

## 2022-02-07 ENCOUNTER — HOSPITAL ENCOUNTER (OUTPATIENT)
Dept: RADIOLOGY | Facility: HOSPITAL | Age: 62
Discharge: HOME | End: 2022-02-07
Attending: FAMILY MEDICINE
Payer: COMMERCIAL

## 2022-02-07 ENCOUNTER — LAB VISIT (OUTPATIENT)
Dept: LAB | Facility: HOSPITAL | Age: 62
End: 2022-02-07
Attending: FAMILY MEDICINE
Payer: COMMERCIAL

## 2022-02-07 VITALS
DIASTOLIC BLOOD PRESSURE: 100 MMHG | SYSTOLIC BLOOD PRESSURE: 160 MMHG | WEIGHT: 162 LBS | BODY MASS INDEX: 29.81 KG/M2 | RESPIRATION RATE: 20 BRPM | HEIGHT: 62 IN | OXYGEN SATURATION: 96 % | HEART RATE: 80 BPM

## 2022-02-07 DIAGNOSIS — R10.13 EPIGASTRIC PAIN: ICD-10-CM

## 2022-02-07 DIAGNOSIS — R42 DIZZINESS: ICD-10-CM

## 2022-02-07 DIAGNOSIS — R10.13 EPIGASTRIC PAIN: Primary | ICD-10-CM

## 2022-02-07 DIAGNOSIS — K80.20 CALCULUS OF GALLBLADDER WITHOUT CHOLECYSTITIS WITHOUT OBSTRUCTION: ICD-10-CM

## 2022-02-07 DIAGNOSIS — R03.0 ELEVATED BLOOD PRESSURE READING IN OFFICE WITHOUT DIAGNOSIS OF HYPERTENSION: ICD-10-CM

## 2022-02-07 LAB
ALBUMIN SERPL BCP-MCNC: 3.2 G/DL (ref 3.5–5)
ALBUMIN/GLOB SERPL: 0.7 {RATIO}
ALP SERPL-CCNC: 165 U/L (ref 50–130)
ALT SERPL W P-5'-P-CCNC: 24 U/L (ref 13–56)
AMYLASE SERPL-CCNC: 36 U/L (ref 25–115)
ANION GAP SERPL CALCULATED.3IONS-SCNC: 16 MMOL/L (ref 7–16)
AST SERPL W P-5'-P-CCNC: 17 U/L (ref 15–37)
BASOPHILS # BLD AUTO: 0.01 K/UL (ref 0–0.2)
BASOPHILS NFR BLD AUTO: 0.1 % (ref 0–1)
BILIRUB SERPL-MCNC: 0.8 MG/DL (ref 0–1.2)
BUN SERPL-MCNC: 11 MG/DL (ref 7–18)
BUN/CREAT SERPL: 11 (ref 6–20)
CALCIUM SERPL-MCNC: 8.9 MG/DL (ref 8.5–10.1)
CHLORIDE SERPL-SCNC: 97 MMOL/L (ref 98–107)
CHOLEST SERPL-MCNC: 170 MG/DL (ref 0–200)
CHOLEST/HDLC SERPL: 2.7 {RATIO}
CO2 SERPL-SCNC: 27 MMOL/L (ref 21–32)
CREAT SERPL-MCNC: 1.03 MG/DL (ref 0.55–1.02)
DIFFERENTIAL METHOD BLD: ABNORMAL
EOSINOPHIL # BLD AUTO: 0.02 K/UL (ref 0–0.5)
EOSINOPHIL NFR BLD AUTO: 0.2 % (ref 1–4)
ERYTHROCYTE [DISTWIDTH] IN BLOOD BY AUTOMATED COUNT: 14 % (ref 11.5–14.5)
GLOBULIN SER-MCNC: 4.4 G/DL (ref 2–4)
GLUCOSE SERPL-MCNC: 100 MG/DL (ref 74–106)
HCT VFR BLD AUTO: 38.1 % (ref 38–47)
HDLC SERPL-MCNC: 63 MG/DL (ref 40–60)
HGB BLD-MCNC: 13.2 G/DL (ref 12–16)
LDLC SERPL CALC-MCNC: 92 MG/DL
LDLC/HDLC SERPL: 1.5 {RATIO}
LIPASE SERPL-CCNC: 51 U/L (ref 73–393)
LYMPHOCYTES # BLD AUTO: 1.04 K/UL (ref 1–4.8)
LYMPHOCYTES NFR BLD AUTO: 11 % (ref 27–41)
MCH RBC QN AUTO: 29.1 PG (ref 27–31)
MCHC RBC AUTO-ENTMCNC: 34.6 G/DL (ref 32–36)
MCV RBC AUTO: 83.9 FL (ref 80–96)
MONOCYTES # BLD AUTO: 0.77 K/UL (ref 0–0.8)
MONOCYTES NFR BLD AUTO: 8.1 % (ref 2–6)
MPC BLD CALC-MCNC: 9.5 FL (ref 9.4–12.4)
NEUTROPHILS # BLD AUTO: 7.65 K/UL (ref 1.8–7.7)
NEUTROPHILS NFR BLD AUTO: 80.6 % (ref 53–65)
NONHDLC SERPL-MCNC: 107 MG/DL
PLATELET # BLD AUTO: 249 K/UL (ref 150–400)
POTASSIUM SERPL-SCNC: 3.9 MMOL/L (ref 3.5–5.1)
PROT SERPL-MCNC: 7.6 G/DL (ref 6.4–8.2)
RBC # BLD AUTO: 4.54 M/UL (ref 4.2–5.4)
SODIUM SERPL-SCNC: 136 MMOL/L (ref 136–145)
TRIGL SERPL-MCNC: 77 MG/DL (ref 35–150)
VLDLC SERPL-MCNC: 15 MG/DL
WBC # BLD AUTO: 9.49 K/UL (ref 4.5–11)

## 2022-02-07 PROCEDURE — 83690 ASSAY OF LIPASE: CPT

## 2022-02-07 PROCEDURE — 82150 ASSAY OF AMYLASE: CPT

## 2022-02-07 PROCEDURE — 3077F PR MOST RECENT SYSTOLIC BLOOD PRESSURE >= 140 MM HG: ICD-10-PCS | Mod: ,,, | Performed by: FAMILY MEDICINE

## 2022-02-07 PROCEDURE — 99215 PR OFFICE/OUTPT VISIT, EST, LEVL V, 40-54 MIN: ICD-10-PCS | Mod: ,,, | Performed by: FAMILY MEDICINE

## 2022-02-07 PROCEDURE — 3008F BODY MASS INDEX DOCD: CPT | Mod: ,,, | Performed by: FAMILY MEDICINE

## 2022-02-07 PROCEDURE — 1159F PR MEDICATION LIST DOCUMENTED IN MEDICAL RECORD: ICD-10-PCS | Mod: ,,, | Performed by: FAMILY MEDICINE

## 2022-02-07 PROCEDURE — 3080F DIAST BP >= 90 MM HG: CPT | Mod: ,,, | Performed by: FAMILY MEDICINE

## 2022-02-07 PROCEDURE — 1159F MED LIST DOCD IN RCRD: CPT | Mod: ,,, | Performed by: FAMILY MEDICINE

## 2022-02-07 PROCEDURE — 76705 ECHO EXAM OF ABDOMEN: CPT | Mod: TC

## 2022-02-07 PROCEDURE — 99215 OFFICE O/P EST HI 40 MIN: CPT | Mod: ,,, | Performed by: FAMILY MEDICINE

## 2022-02-07 PROCEDURE — 93000 PR ELECTROCARDIOGRAM, COMPLETE: ICD-10-PCS | Mod: ,,, | Performed by: FAMILY MEDICINE

## 2022-02-07 PROCEDURE — 3077F SYST BP >= 140 MM HG: CPT | Mod: ,,, | Performed by: FAMILY MEDICINE

## 2022-02-07 PROCEDURE — 80053 COMPREHEN METABOLIC PANEL: CPT

## 2022-02-07 PROCEDURE — 3008F PR BODY MASS INDEX (BMI) DOCUMENTED: ICD-10-PCS | Mod: ,,, | Performed by: FAMILY MEDICINE

## 2022-02-07 PROCEDURE — 36415 COLL VENOUS BLD VENIPUNCTURE: CPT | Performed by: FAMILY MEDICINE

## 2022-02-07 PROCEDURE — 93000 ELECTROCARDIOGRAM COMPLETE: CPT | Mod: ,,, | Performed by: FAMILY MEDICINE

## 2022-02-07 PROCEDURE — 3080F PR MOST RECENT DIASTOLIC BLOOD PRESSURE >= 90 MM HG: ICD-10-PCS | Mod: ,,, | Performed by: FAMILY MEDICINE

## 2022-02-07 RX ORDER — CIPROFLOXACIN 500 MG/1
500 TABLET ORAL EVERY 12 HOURS
Qty: 14 TABLET | Refills: 0 | Status: SHIPPED | OUTPATIENT
Start: 2022-02-07 | End: 2024-04-02

## 2022-02-07 RX ORDER — PANTOPRAZOLE SODIUM 40 MG/1
40 TABLET, DELAYED RELEASE ORAL DAILY
Qty: 60 TABLET | Refills: 11 | Status: SHIPPED | OUTPATIENT
Start: 2022-02-07 | End: 2024-04-02

## 2022-02-07 RX ORDER — HYDROCODONE BITARTRATE AND ACETAMINOPHEN 7.5; 325 MG/1; MG/1
1 TABLET ORAL EVERY 6 HOURS PRN
Qty: 30 TABLET | Refills: 0 | Status: ON HOLD | OUTPATIENT
Start: 2022-02-07 | End: 2022-11-13 | Stop reason: HOSPADM

## 2022-02-07 RX ORDER — ONDANSETRON 4 MG/1
4 TABLET, ORALLY DISINTEGRATING ORAL 2 TIMES DAILY
Qty: 30 TABLET | Refills: 1 | Status: SHIPPED | OUTPATIENT
Start: 2022-02-07 | End: 2024-04-02

## 2022-02-07 NOTE — ASSESSMENT & PLAN NOTE
Epigastric pain/fullness likely due to cholelithiasis.  Will treat her with had a praise all 40 mg twice daily along with Zofran for nausea vomiting and Norco 7.5 as needed for pain.  Because there may be some cholecystitis associated with this even though her labs are normal we will put her on Cipro 500 mg twice daily.  Patient does not want to go in the hospital today and wants to be treated is an outpatient.  Will refer her to General surgery at [facility] per the patient's request.  Patient requested Dr. Gonzalez.

## 2022-02-07 NOTE — ASSESSMENT & PLAN NOTE
Patient has a history of hypertension is not been able to take her medicines due to nausea and some vomiting.  Also think that some of her pain is causing elevation her blood pressure.  Restart her all her blood pressure medicines.  Patient is to monitor her blood pressures at home

## 2022-02-07 NOTE — PROGRESS NOTES
"   Eric Benavides DO   [address]  [place]  [address]      PATIENT NAME: Radha Crockett  : 1960  DATE: 22  MRN: [id number]      Billing Provider: Eric Benavides DO  Level of Service:   Patient PCP Information     Provider PCP Type    Eric Benavides DO General          Reason for Visit / Chief Complaint: Abdominal Pain ("squeezing" pain to mid epigastric area x months. Pain has gotten worse x 5 days. Patient reports pain started on left side and radiates to center.), Nausea (Patient is having nausea off and on with indigestion.), and Dizziness (Dizziness since Wednesday.)       Update PCP  Update Chief Complaint         History of Present Illness / Problem Focused Workflow     Radha Crockett presents to the clinic with Abdominal Pain ("squeezing" pain to mid epigastric area x months. Pain has gotten worse x 5 days. Patient reports pain started on left side and radiates to center.), Nausea (Patient is having nausea off and on with indigestion.), and Dizziness (Dizziness since Wednesday.)     Patient reports fullness in her epigastric region with some nausea associated with this and fullness or pressure in the area for about 4 weeks.  Has progressively got worse over the last 24 hours with noted.  She does note that let us tends to make it worse.  She still has her gallbladder.  She denies any blood in her stools or black tarry stools or vomiting up blood.  Patient has had a cardiac catheterization that was normal.  She has been cleared by Cardiology as she does report some pressure in her chest.  EKG did the today did show normal sinus rhythm with nonspecific ST T wave changes but no acute ST elevations or T-wave inversions.      Review of Systems     Review of Systems   Constitutional: Negative for activity change, appetite change, chills, fatigue and fever.   HENT: Negative for nasal congestion, ear discharge, ear pain, mouth dryness, mouth sores, postnasal drip, " sinus pressure/congestion, sore throat and voice change.    Eyes: Negative for pain, discharge, redness, itching and visual disturbance.   Respiratory: Negative for apnea, cough, chest tightness, shortness of breath and wheezing.    Cardiovascular: Negative for chest pain, palpitations, leg swelling and claudication.   Gastrointestinal: Positive for abdominal distention, abdominal pain, nausea and vomiting. Negative for anal bleeding, blood in stool, change in bowel habit, constipation, diarrhea, reflux and change in bowel habit.   Endocrine: Negative for cold intolerance, heat intolerance, polydipsia, polyphagia and polyuria.   Genitourinary: Negative for difficulty urinating, enuresis, erectile dysfunction, frequency, genital sores, hematuria, hot flashes, menstrual irregularity, urgency and vaginal dryness.   Musculoskeletal: Negative for arthralgias, back pain, gait problem, leg pain, myalgias and neck pain.   Integumentary:  Negative for rash, mole/lesion, breast mass, breast discharge and breast tenderness.   Allergic/Immunologic: Negative for environmental allergies and food allergies.   Neurological: Negative for dizziness, vertigo, tremors, seizures, syncope, facial asymmetry, speech difficulty, weakness, light-headedness, numbness, headaches, disturbances in coordination, memory loss and coordination difficulties.   Hematological: Negative for adenopathy. Does not bruise/bleed easily.   Psychiatric/Behavioral: Negative for agitation, behavioral problems, confusion, decreased concentration, dysphoric mood, hallucinations, self-injury, sleep disturbance and suicidal ideas. The patient is not nervous/anxious and is not hyperactive.    Breast: Negative for mass and tenderness      Medical / Social / Family History     Past Medical History:   Diagnosis Date    Allergic rhinitis, mild     Anemia     Anxiety     Degenerative disc disease, cervical     Depression     GERD (gastroesophageal reflux disease)      Hyperlipidemia     Hypertension     Insomnia     Migraine     Vitamin D deficiency        Past Surgical History:   Procedure Laterality Date    ANGIOGRAM, CORONARY, WITH LEFT HEART CATHETERIZATION Left 9/17/2021    Procedure: Left heart cath w/ coronary angiograms;  Surgeon: Demi Carias MD;  Location: [facility] CATH LAB;  Service: Cardiology;  Laterality: Left;    INGUINAL HERNIA REPAIR Right 1968       Social History  Ms.  reports that she quit smoking about 12 years ago. Her smoking use included cigarettes. She has never used smokeless tobacco. She reports that she does not drink alcohol and does not use drugs.    Family History  Ms.'s family history includes Arthritis in her other; Cancer in her father; Diabetes in her other; Hypertension in her father.    Medications and Allergies     Medications  Outpatient Medications Marked as Taking for the 2/7/22 encounter (Office Visit) with Eric Benavides, DO   Medication Sig Dispense Refill    albuterol (PROVENTIL/VENTOLIN HFA) 90 mcg/actuation inhaler Inhale 2 puffs into the lungs every 6 (six) hours as needed for Wheezing. Rescue      celecoxib (CELEBREX) 200 MG capsule Take 1 capsule (200 mg total) by mouth once daily. 30 capsule 5    cetirizine (ZYRTEC) 10 MG tablet Take 10 mg by mouth once daily.      EScitalopram oxalate (LEXAPRO) 10 MG tablet Take 1 tablet (10 mg total) by mouth once daily. 30 tablet 5    fluticasone propionate (FLONASE) 50 mcg/actuation nasal spray 1 spray by Each Nostril route 2 (two) times a day.      hydroCHLOROthiazide (HYDRODIURIL) 12.5 MG Tab Take 12.5 mg by mouth once daily.      hydrOXYzine pamoate (VISTARIL) 25 MG Cap Take 1 capsule (25 mg total) by mouth 2 (two) times daily as needed (for anxiety). 60 capsule 5    iron fum-B12-IF-C-folic acid (FOLTRIN) 110-0.5 mg capsule Take 1 capsule by mouth 2 (two) times daily.      omeprazole (PRILOSEC) 20 MG capsule Take 1 capsule (20 mg total) by mouth 2 (two) times a  day. 30 capsule 5    sumatriptan (IMITREX) 100 MG tablet Take 1 tablet (100 mg total) by mouth every 2 (two) hours as needed for Migraine (do not take over 200 mg in 24 hrs). 1 tablet by mouth at onset of headache,may repeat once in 24 hours if no relief. 9 tablet 5    tiZANidine (ZANAFLEX) 4 MG tablet Take 1 tablet (4 mg total) by mouth nightly. 30 tablet 5       Allergies  Review of patient's allergies indicates:  No Known Allergies    Physical Examination     Vitals:    02/07/22 1057   BP: (!) 160/100   Pulse: 80   Resp: 20     Physical Exam  Vitals reviewed.   Constitutional:       General: She is not in acute distress.     Appearance: Normal appearance. She is obese. She is ill-appearing. She is not toxic-appearing or diaphoretic.   HENT:      Head: Normocephalic and atraumatic.      Nose: Nose normal. No congestion or rhinorrhea.      Mouth/Throat:      Mouth: Mucous membranes are moist.      Pharynx: Oropharynx is clear. No oropharyngeal exudate or posterior oropharyngeal erythema.   Eyes:      General: No scleral icterus.     Extraocular Movements: Extraocular movements intact.      Conjunctiva/sclera: Conjunctivae normal.      Pupils: Pupils are equal, round, and reactive to light.   Neck:      Vascular: No carotid bruit.   Cardiovascular:      Rate and Rhythm: Normal rate and regular rhythm.      Pulses: Normal pulses.      Heart sounds: Normal heart sounds. No murmur heard.  No gallop.    Pulmonary:      Effort: Pulmonary effort is normal. No respiratory distress.      Breath sounds: Normal breath sounds. No wheezing.   Abdominal:      General: Abdomen is flat. Bowel sounds are normal. There is distension.      Palpations: Abdomen is soft. There is no mass.      Tenderness: There is abdominal tenderness. There is no guarding or rebound.      Hernia: No hernia is present.   Musculoskeletal:         General: Normal range of motion.      Cervical back: Normal range of motion and neck supple.      Right  lower leg: No edema.      Left lower leg: No edema.   Lymphadenopathy:      Cervical: No cervical adenopathy.   Skin:     General: Skin is warm and dry.      Capillary Refill: Capillary refill takes less than 2 seconds.      Coloration: Skin is not jaundiced.      Findings: No lesion.   Neurological:      General: No focal deficit present.      Mental Status: She is alert and oriented to person, place, and time. Mental status is at baseline.      Cranial Nerves: No cranial nerve deficit.      Sensory: No sensory deficit.      Motor: No weakness.      Coordination: Coordination normal.      Gait: Gait normal.      Deep Tendon Reflexes: Reflexes normal.   Psychiatric:         Mood and Affect: Mood normal.         Behavior: Behavior normal.         Thought Content: Thought content normal.         Judgment: Judgment normal.     She had all          Lab Results   Component Value Date    WBC 9.49 02/07/2022    HGB 13.2 02/07/2022    HCT 38.1 02/07/2022    MCV 83.9 02/07/2022     02/07/2022          Sodium   Date Value Ref Range Status   02/07/2022 136 136 - 145 mmol/L Final     Potassium   Date Value Ref Range Status   02/07/2022 3.9 3.5 - 5.1 mmol/L Final     Chloride   Date Value Ref Range Status   02/07/2022 97 (L) 98 - 107 mmol/L Final     CO2   Date Value Ref Range Status   02/07/2022 27 21 - 32 mmol/L Final     Glucose   Date Value Ref Range Status   02/07/2022 100 74 - 106 mg/dL Final     BUN   Date Value Ref Range Status   02/07/2022 11 7 - 18 mg/dL Final     Creatinine   Date Value Ref Range Status   02/07/2022 1.03 (H) 0.55 - 1.02 mg/dL Final     Calcium   Date Value Ref Range Status   02/07/2022 8.9 8.5 - 10.1 mg/dL Final     Total Protein   Date Value Ref Range Status   02/07/2022 7.6 6.4 - 8.2 g/dL Final     Albumin   Date Value Ref Range Status   02/07/2022 3.2 (L) 3.5 - 5.0 g/dL Final     Bilirubin, Total   Date Value Ref Range Status   02/07/2022 0.8 0.0 - 1.2 mg/dL Final     Alk Phos   Date Value  Ref Range Status   02/07/2022 165 (H) 50 - 130 U/L Final     AST   Date Value Ref Range Status   02/07/2022 17 15 - 37 U/L Final     ALT   Date Value Ref Range Status   02/07/2022 24 13 - 56 U/L Final     Anion Gap   Date Value Ref Range Status   02/07/2022 16 7 - 16 mmol/L Final     eGFR   Date Value Ref Range Status   02/07/2022 58 (L) >=60 mL/min/1.73m² Final      US Abdomen Limited  Narrative: EXAMINATION:  US ABDOMEN LIMITED    CLINICAL HISTORY:  Epigastric pain    TECHNIQUE:  Grayscale and color Doppler imaging performed.    COMPARISON:  None.    FINDINGS:  The liver is normal in size and echogenicity. The gallbladder has multiple calculi in the lumen.  The gallbladder wall thickness is 3.8 mm . The common bile duct measures 5.5 mm.    The visualized portion of the pancreas appear within normal limits    The right kidney is normal in size and echogenicity and measures 8.51 cm .    No free fluid or free air seen.  Impression: Cholelithiasis.  No other evidence of abnormality demonstrated.    Ultrasound images stored and captured.    Electronically signed by: Noe Villegas  Date:    02/07/2022  Time:    13:13     Procedures   Assessment and Plan (including Health Maintenance)      Problem List  Smart Sets  Document Outside HM   :    Plan:         Health Maintenance Due   Topic Date Due    Hepatitis C Screening  Never done    COVID-19 Vaccine (1) Never done    HIV Screening  Never done    Cervical Cancer Screening  Never done    Colorectal Cancer Screening  Never done    Shingles Vaccine (1 of 2) Never done    Mammogram  10/07/2021       Problem List Items Addressed This Visit        Cardiac/Vascular    Elevated blood pressure reading in office without diagnosis of hypertension    Current Assessment & Plan     Patient has a history of hypertension is not been able to take her medicines due to nausea and some vomiting.  Also think that some of her pain is causing elevation her blood pressure.  Restart her  all her blood pressure medicines.  Patient is to monitor her blood pressures at home         Relevant Orders    POCT EKG 12-LEAD (NOT FOR [facility] USE)       GI    Epigastric pain - Primary    Current Assessment & Plan     Epigastric pain/fullness likely due to cholelithiasis.  Will treat her with had a praise all 40 mg twice daily along with Zofran for nausea vomiting and Norco 7.5 as needed for pain.  Because there may be some cholecystitis associated with this even though her labs are normal we will put her on Cipro 500 mg twice daily.  Patient does not want to go in the hospital today and wants to be treated is an outpatient.  Will refer her to General surgery at [facility] per the patient's request.  Patient requested Dr. Gonzalez.         Relevant Medications    pantoprazole (PROTONIX) 40 MG tablet    Other Relevant Orders    POCT EKG 12-LEAD (NOT FOR [facility] USE)    CBC Auto Differential (Completed)    Comprehensive Metabolic Panel (Completed)    Lipid Panel (Completed)    Amylase (Completed)    Lipase (Completed)    US Abdomen Limited (Completed)    Calculus of gallbladder without cholecystitis without obstruction    Current Assessment & Plan     Multiple calculi noted in the gallbladder will treat with Cipro 500 twice daily as well as pantoprazole and medicines for nausea pain.  Follow-up with General surgery.         Relevant Medications    pantoprazole (PROTONIX) 40 MG tablet    ondansetron (ZOFRAN-ODT) 4 MG TbDL    HYDROcodone-acetaminophen (NORCO) 7.5-325 mg per tablet    ciprofloxacin HCl (CIPRO) 500 MG tablet    Other Relevant Orders    Ambulatory referral/consult to General Surgery      Other Visit Diagnoses     Dizziness        Relevant Orders    POCT EKG 12-LEAD (NOT FOR [facility] USE)          Health Maintenance Topics with due status: Not Due       Topic Last Completion Date    TETANUS VACCINE 07/09/2021    Lipid Panel 02/07/2022       Future Appointments   Date Time Provider Department Center    5/9/2022  3:30 PM Eric Benavides DO [facility] [place]   7/14/2022  9:00 AM Devi Zepeda MD [facility]   11/10/2022  3:30 PM Eric Benavides DO [facility] [place]        Follow up in about 4 weeks (around 3/7/2022).     Signature:  Eric Benavides DO  [address] [place]  [address]    Date of encounter: 2/7/22

## 2022-02-07 NOTE — ASSESSMENT & PLAN NOTE
Multiple calculi noted in the gallbladder will treat with Cipro 500 twice daily as well as pantoprazole and medicines for nausea pain.  Follow-up with General surgery.

## 2022-02-08 ENCOUNTER — ANESTHESIA EVENT (OUTPATIENT)
Dept: SURGERY | Facility: HOSPITAL | Age: 62
End: 2022-02-08
Payer: COMMERCIAL

## 2022-02-08 ENCOUNTER — HOSPITAL ENCOUNTER (OUTPATIENT)
Facility: HOSPITAL | Age: 62
Discharge: HOME | End: 2022-02-11
Attending: EMERGENCY MEDICINE | Admitting: SURGERY
Payer: COMMERCIAL

## 2022-02-08 ENCOUNTER — ANESTHESIA (OUTPATIENT)
Dept: SURGERY | Facility: HOSPITAL | Age: 62
End: 2022-02-08
Payer: COMMERCIAL

## 2022-02-08 DIAGNOSIS — R06.02 SHORTNESS OF BREATH: ICD-10-CM

## 2022-02-08 DIAGNOSIS — I21.4 NSTEMI (NON-ST ELEVATED MYOCARDIAL INFARCTION): ICD-10-CM

## 2022-02-08 DIAGNOSIS — R03.0 ELEVATED BLOOD PRESSURE READING IN OFFICE WITHOUT DIAGNOSIS OF HYPERTENSION: ICD-10-CM

## 2022-02-08 DIAGNOSIS — R94.31 ABNORMAL EKG: ICD-10-CM

## 2022-02-08 DIAGNOSIS — K80.50 BILIARY COLIC: Primary | ICD-10-CM

## 2022-02-08 DIAGNOSIS — K80.00 CALCULUS OF GALLBLADDER WITH ACUTE CHOLECYSTITIS WITHOUT OBSTRUCTION: ICD-10-CM

## 2022-02-08 DIAGNOSIS — D22.9 ATYPICAL MOLE: ICD-10-CM

## 2022-02-08 DIAGNOSIS — R09.02 HYPOXEMIA REQUIRING SUPPLEMENTAL OXYGEN: ICD-10-CM

## 2022-02-08 DIAGNOSIS — I27.20 PULMONARY HTN: ICD-10-CM

## 2022-02-08 DIAGNOSIS — R07.9 CHEST PAIN, UNSPECIFIED TYPE: ICD-10-CM

## 2022-02-08 DIAGNOSIS — R10.13 EPIGASTRIC PAIN: ICD-10-CM

## 2022-02-08 DIAGNOSIS — K80.20 CALCULUS OF GALLBLADDER WITHOUT CHOLECYSTITIS WITHOUT OBSTRUCTION: ICD-10-CM

## 2022-02-08 DIAGNOSIS — R79.89 ELEVATED TROPONIN: ICD-10-CM

## 2022-02-08 DIAGNOSIS — R11.0 NAUSEA: ICD-10-CM

## 2022-02-08 DIAGNOSIS — Z99.81 HYPOXEMIA REQUIRING SUPPLEMENTAL OXYGEN: ICD-10-CM

## 2022-02-08 DIAGNOSIS — R07.9 CHEST PAIN: ICD-10-CM

## 2022-02-08 DIAGNOSIS — S69.91XA: ICD-10-CM

## 2022-02-08 LAB
ALBUMIN SERPL BCP-MCNC: 3 G/DL (ref 3.5–5)
ALBUMIN/GLOB SERPL: 0.8 {RATIO}
ALP SERPL-CCNC: 265 U/L (ref 50–130)
ALT SERPL W P-5'-P-CCNC: 46 U/L (ref 13–56)
ANION GAP SERPL CALCULATED.3IONS-SCNC: 14 MMOL/L (ref 7–16)
APTT PPP: 40.1 SECONDS (ref 25.2–37.3)
AST SERPL W P-5'-P-CCNC: 76 U/L (ref 15–37)
BASOPHILS # BLD AUTO: 0.04 K/UL (ref 0–0.2)
BASOPHILS NFR BLD AUTO: 0.5 % (ref 0–1)
BILIRUB SERPL-MCNC: 1.6 MG/DL (ref 0–1.2)
BUN SERPL-MCNC: 15 MG/DL (ref 7–18)
BUN/CREAT SERPL: 13 (ref 6–20)
CALCIUM SERPL-MCNC: 8.6 MG/DL (ref 8.5–10.1)
CHLORIDE SERPL-SCNC: 100 MMOL/L (ref 98–107)
CO2 SERPL-SCNC: 26 MMOL/L (ref 21–32)
CREAT SERPL-MCNC: 1.15 MG/DL (ref 0.55–1.02)
DIFFERENTIAL METHOD BLD: ABNORMAL
EOSINOPHIL # BLD AUTO: 0.09 K/UL (ref 0–0.5)
EOSINOPHIL NFR BLD AUTO: 1.1 % (ref 1–4)
ERYTHROCYTE [DISTWIDTH] IN BLOOD BY AUTOMATED COUNT: 13.5 % (ref 11.5–14.5)
GLOBULIN SER-MCNC: 3.7 G/DL (ref 2–4)
GLUCOSE SERPL-MCNC: 119 MG/DL (ref 74–106)
HCT VFR BLD AUTO: 34 % (ref 38–47)
HGB BLD-MCNC: 11.6 G/DL (ref 12–16)
IMM GRANULOCYTES # BLD AUTO: 0.04 K/UL (ref 0–0.04)
IMM GRANULOCYTES NFR BLD: 0.5 % (ref 0–0.4)
INR BLD: 0.94 (ref 0.9–1.1)
LIPASE SERPL-CCNC: 50 U/L (ref 73–393)
LYMPHOCYTES # BLD AUTO: 1.09 K/UL (ref 1–4.8)
LYMPHOCYTES NFR BLD AUTO: 12.7 % (ref 27–41)
MCH RBC QN AUTO: 28.2 PG (ref 27–31)
MCHC RBC AUTO-ENTMCNC: 34.1 G/DL (ref 32–36)
MCV RBC AUTO: 82.5 FL (ref 80–96)
MONOCYTES # BLD AUTO: 0.78 K/UL (ref 0–0.8)
MONOCYTES NFR BLD AUTO: 9.1 % (ref 2–6)
MPC BLD CALC-MCNC: 9.3 FL (ref 9.4–12.4)
NEUTROPHILS # BLD AUTO: 6.53 K/UL (ref 1.8–7.7)
NEUTROPHILS NFR BLD AUTO: 76.1 % (ref 53–65)
NRBC # BLD AUTO: 0 X10E3/UL
NRBC, AUTO (.00): 0 %
PLATELET # BLD AUTO: 244 K/UL (ref 150–400)
POTASSIUM SERPL-SCNC: 3.9 MMOL/L (ref 3.5–5.1)
PROT SERPL-MCNC: 6.7 G/DL (ref 6.4–8.2)
PROTHROMBIN TIME: 12.6 SECONDS (ref 11.7–14.7)
RBC # BLD AUTO: 4.12 M/UL (ref 4.2–5.4)
SARS-COV-2 RDRP RESP QL NAA+PROBE: NEGATIVE
SODIUM SERPL-SCNC: 136 MMOL/L (ref 136–145)
WBC # BLD AUTO: 8.57 K/UL (ref 4.5–11)

## 2022-02-08 PROCEDURE — 96365 THER/PROPH/DIAG IV INF INIT: CPT | Mod: 59 | Performed by: NURSE PRACTITIONER

## 2022-02-08 PROCEDURE — 37000008 HC ANESTHESIA 1ST 15 MINUTES: Performed by: SURGERY

## 2022-02-08 PROCEDURE — 63600175 PHARM REV CODE 636 W HCPCS: Performed by: NURSE ANESTHETIST, CERTIFIED REGISTERED

## 2022-02-08 PROCEDURE — 96372 THER/PROPH/DIAG INJ SC/IM: CPT | Mod: 59

## 2022-02-08 PROCEDURE — S5010 5% DEXTROSE AND 0.45% SALINE: HCPCS | Performed by: SURGERY

## 2022-02-08 PROCEDURE — D9220A PRA ANESTHESIA: Mod: CRNA,,, | Performed by: NURSE ANESTHETIST, CERTIFIED REGISTERED

## 2022-02-08 PROCEDURE — 36000709 HC OR TIME LEV III EA ADD 15 MIN: Performed by: SURGERY

## 2022-02-08 PROCEDURE — 85730 THROMBOPLASTIN TIME PARTIAL: CPT | Performed by: EMERGENCY MEDICINE

## 2022-02-08 PROCEDURE — 71000033 HC RECOVERY, INTIAL HOUR: Performed by: SURGERY

## 2022-02-08 PROCEDURE — 27000510 HC BLANKET BAIR HUGGER ANY SIZE: Performed by: ANESTHESIOLOGY

## 2022-02-08 PROCEDURE — 96374 THER/PROPH/DIAG INJ IV PUSH: CPT

## 2022-02-08 PROCEDURE — 25000003 PHARM REV CODE 250: Performed by: NURSE ANESTHETIST, CERTIFIED REGISTERED

## 2022-02-08 PROCEDURE — 37000009 HC ANESTHESIA EA ADD 15 MINS: Performed by: SURGERY

## 2022-02-08 PROCEDURE — 27000260 *HC AIRWAY ORAL: Performed by: ANESTHESIOLOGY

## 2022-02-08 PROCEDURE — D9220A PRA ANESTHESIA: Mod: ANES,,, | Performed by: ANESTHESIOLOGY

## 2022-02-08 PROCEDURE — 63600175 PHARM REV CODE 636 W HCPCS: Performed by: NURSE PRACTITIONER

## 2022-02-08 PROCEDURE — 27000689 HC BLADE LARYNGOSCOPE ANY SIZE: Performed by: ANESTHESIOLOGY

## 2022-02-08 PROCEDURE — 96375 TX/PRO/DX INJ NEW DRUG ADDON: CPT

## 2022-02-08 PROCEDURE — 80053 COMPREHEN METABOLIC PANEL: CPT | Performed by: EMERGENCY MEDICINE

## 2022-02-08 PROCEDURE — 99283 PR EMERGENCY DEPT VISIT,LEVEL III: ICD-10-PCS | Mod: ,,, | Performed by: EMERGENCY MEDICINE

## 2022-02-08 PROCEDURE — 25000003 PHARM REV CODE 250: Performed by: NURSE PRACTITIONER

## 2022-02-08 PROCEDURE — 36000708 HC OR TIME LEV III 1ST 15 MIN: Performed by: SURGERY

## 2022-02-08 PROCEDURE — 27000655: Performed by: ANESTHESIOLOGY

## 2022-02-08 PROCEDURE — C1729 CATH, DRAINAGE: HCPCS | Performed by: SURGERY

## 2022-02-08 PROCEDURE — 27000165 HC TUBE, ETT CUFFED: Performed by: ANESTHESIOLOGY

## 2022-02-08 PROCEDURE — C1894 INTRO/SHEATH, NON-LASER: HCPCS | Performed by: SURGERY

## 2022-02-08 PROCEDURE — 88304 SURGICAL PATHOLOGY: ICD-10-PCS | Mod: 26,,, | Performed by: PATHOLOGY

## 2022-02-08 PROCEDURE — 96375 TX/PRO/DX INJ NEW DRUG ADDON: CPT | Mod: 59

## 2022-02-08 PROCEDURE — D9220A PRA ANESTHESIA: ICD-10-PCS | Mod: CRNA,,, | Performed by: NURSE ANESTHETIST, CERTIFIED REGISTERED

## 2022-02-08 PROCEDURE — 87635 SARS-COV-2 COVID-19 AMP PRB: CPT | Performed by: EMERGENCY MEDICINE

## 2022-02-08 PROCEDURE — 27000716 HC OXISENSOR PROBE, ANY SIZE: Performed by: ANESTHESIOLOGY

## 2022-02-08 PROCEDURE — 36415 COLL VENOUS BLD VENIPUNCTURE: CPT | Performed by: EMERGENCY MEDICINE

## 2022-02-08 PROCEDURE — 96376 TX/PRO/DX INJ SAME DRUG ADON: CPT | Mod: 59

## 2022-02-08 PROCEDURE — 27201423 OPTIME MED/SURG SUP & DEVICES STERILE SUPPLY: Performed by: SURGERY

## 2022-02-08 PROCEDURE — 88304 TISSUE EXAM BY PATHOLOGIST: CPT | Mod: 26,,, | Performed by: PATHOLOGY

## 2022-02-08 PROCEDURE — 85025 COMPLETE CBC W/AUTO DIFF WBC: CPT | Performed by: EMERGENCY MEDICINE

## 2022-02-08 PROCEDURE — 25000003 PHARM REV CODE 250: Performed by: SURGERY

## 2022-02-08 PROCEDURE — 83690 ASSAY OF LIPASE: CPT | Performed by: EMERGENCY MEDICINE

## 2022-02-08 PROCEDURE — 88304 TISSUE EXAM BY PATHOLOGIST: CPT | Mod: SUR | Performed by: SURGERY

## 2022-02-08 PROCEDURE — 63600175 PHARM REV CODE 636 W HCPCS: Performed by: SURGERY

## 2022-02-08 PROCEDURE — 99284 EMERGENCY DEPT VISIT MOD MDM: CPT | Mod: 25

## 2022-02-08 PROCEDURE — 85610 PROTHROMBIN TIME: CPT | Performed by: EMERGENCY MEDICINE

## 2022-02-08 PROCEDURE — 63600175 PHARM REV CODE 636 W HCPCS: Performed by: EMERGENCY MEDICINE

## 2022-02-08 PROCEDURE — 99283 EMERGENCY DEPT VISIT LOW MDM: CPT | Mod: ,,, | Performed by: EMERGENCY MEDICINE

## 2022-02-08 PROCEDURE — 25000242 PHARM REV CODE 250 ALT 637 W/ HCPCS: Performed by: SURGERY

## 2022-02-08 PROCEDURE — D9220A PRA ANESTHESIA: ICD-10-PCS | Mod: ANES,,, | Performed by: ANESTHESIOLOGY

## 2022-02-08 PROCEDURE — 96367 TX/PROPH/DG ADDL SEQ IV INF: CPT | Mod: 59

## 2022-02-08 RX ORDER — ENOXAPARIN SODIUM 100 MG/ML
40 INJECTION SUBCUTANEOUS EVERY 24 HOURS
Status: DISCONTINUED | OUTPATIENT
Start: 2022-02-08 | End: 2022-02-08

## 2022-02-08 RX ORDER — HYDROCODONE BITARTRATE AND ACETAMINOPHEN 7.5; 325 MG/1; MG/1
1 TABLET ORAL EVERY 6 HOURS PRN
Status: DISCONTINUED | OUTPATIENT
Start: 2022-02-08 | End: 2022-02-11 | Stop reason: HOSPADM

## 2022-02-08 RX ORDER — ONDANSETRON 2 MG/ML
4 INJECTION INTRAMUSCULAR; INTRAVENOUS DAILY PRN
Status: DISCONTINUED | OUTPATIENT
Start: 2022-02-08 | End: 2022-02-10 | Stop reason: HOSPADM

## 2022-02-08 RX ORDER — CEFAZOLIN SODIUM 1 G/3ML
INJECTION, POWDER, FOR SOLUTION INTRAMUSCULAR; INTRAVENOUS
Status: DISCONTINUED | OUTPATIENT
Start: 2022-02-08 | End: 2022-02-08

## 2022-02-08 RX ORDER — ACETAMINOPHEN 325 MG/1
650 TABLET ORAL EVERY 6 HOURS PRN
Status: DISCONTINUED | OUTPATIENT
Start: 2022-02-08 | End: 2022-02-10

## 2022-02-08 RX ORDER — MIDAZOLAM HYDROCHLORIDE 1 MG/ML
INJECTION INTRAMUSCULAR; INTRAVENOUS
Status: DISCONTINUED | OUTPATIENT
Start: 2022-02-08 | End: 2022-02-08

## 2022-02-08 RX ORDER — DIPHENHYDRAMINE HYDROCHLORIDE 50 MG/ML
25 INJECTION INTRAMUSCULAR; INTRAVENOUS EVERY 6 HOURS PRN
Status: DISCONTINUED | OUTPATIENT
Start: 2022-02-08 | End: 2022-02-10 | Stop reason: HOSPADM

## 2022-02-08 RX ORDER — ALBUTEROL SULFATE 90 UG/1
2 AEROSOL, METERED RESPIRATORY (INHALATION) EVERY 6 HOURS PRN
Status: DISCONTINUED | OUTPATIENT
Start: 2022-02-08 | End: 2022-02-11 | Stop reason: HOSPADM

## 2022-02-08 RX ORDER — ROCURONIUM BROMIDE 10 MG/ML
INJECTION, SOLUTION INTRAVENOUS
Status: DISCONTINUED | OUTPATIENT
Start: 2022-02-08 | End: 2022-02-08

## 2022-02-08 RX ORDER — TIZANIDINE 4 MG/1
4 TABLET ORAL NIGHTLY
Status: DISCONTINUED | OUTPATIENT
Start: 2022-02-08 | End: 2022-02-09

## 2022-02-08 RX ORDER — ONDANSETRON 2 MG/ML
4 INJECTION INTRAMUSCULAR; INTRAVENOUS EVERY 12 HOURS PRN
Status: DISCONTINUED | OUTPATIENT
Start: 2022-02-08 | End: 2022-02-10

## 2022-02-08 RX ORDER — PROPOFOL 10 MG/ML
VIAL (ML) INTRAVENOUS
Status: DISCONTINUED | OUTPATIENT
Start: 2022-02-08 | End: 2022-02-08

## 2022-02-08 RX ORDER — TALC
6 POWDER (GRAM) TOPICAL NIGHTLY PRN
Status: DISCONTINUED | OUTPATIENT
Start: 2022-02-08 | End: 2022-02-08

## 2022-02-08 RX ORDER — LIDOCAINE HYDROCHLORIDE 10 MG/ML
1 INJECTION, SOLUTION EPIDURAL; INFILTRATION; INTRACAUDAL; PERINEURAL ONCE
Status: DISCONTINUED | OUTPATIENT
Start: 2022-02-08 | End: 2022-02-11 | Stop reason: HOSPADM

## 2022-02-08 RX ORDER — ACETAMINOPHEN 325 MG/1
650 TABLET ORAL EVERY 4 HOURS PRN
Status: DISCONTINUED | OUTPATIENT
Start: 2022-02-08 | End: 2022-02-10

## 2022-02-08 RX ORDER — MORPHINE SULFATE 4 MG/ML
4 INJECTION, SOLUTION INTRAMUSCULAR; INTRAVENOUS EVERY 4 HOURS PRN
Status: DISCONTINUED | OUTPATIENT
Start: 2022-02-08 | End: 2022-02-11 | Stop reason: HOSPADM

## 2022-02-08 RX ORDER — MORPHINE SULFATE 10 MG/ML
4 INJECTION INTRAMUSCULAR; INTRAVENOUS; SUBCUTANEOUS EVERY 4 HOURS PRN
Status: DISCONTINUED | OUTPATIENT
Start: 2022-02-08 | End: 2022-02-11 | Stop reason: HOSPADM

## 2022-02-08 RX ORDER — ACETAMINOPHEN 325 MG/1
650 TABLET ORAL EVERY 8 HOURS PRN
Status: DISCONTINUED | OUTPATIENT
Start: 2022-02-08 | End: 2022-02-11 | Stop reason: HOSPADM

## 2022-02-08 RX ORDER — LIDOCAINE HYDROCHLORIDE 20 MG/ML
INJECTION, SOLUTION EPIDURAL; INFILTRATION; INTRACAUDAL; PERINEURAL
Status: DISCONTINUED | OUTPATIENT
Start: 2022-02-08 | End: 2022-02-08

## 2022-02-08 RX ORDER — OXYCODONE HYDROCHLORIDE 5 MG/1
5 TABLET ORAL
Status: DISCONTINUED | OUTPATIENT
Start: 2022-02-08 | End: 2022-02-10 | Stop reason: HOSPADM

## 2022-02-08 RX ORDER — CLONIDINE HYDROCHLORIDE 0.1 MG/1
0.1 TABLET ORAL EVERY 6 HOURS PRN
Status: DISCONTINUED | OUTPATIENT
Start: 2022-02-08 | End: 2022-02-11 | Stop reason: HOSPADM

## 2022-02-08 RX ORDER — ONDANSETRON 2 MG/ML
4 INJECTION INTRAMUSCULAR; INTRAVENOUS
Status: COMPLETED | OUTPATIENT
Start: 2022-02-08 | End: 2022-02-08

## 2022-02-08 RX ORDER — ONDANSETRON 4 MG/1
4 TABLET, ORALLY DISINTEGRATING ORAL 2 TIMES DAILY
Status: DISCONTINUED | OUTPATIENT
Start: 2022-02-08 | End: 2022-02-09

## 2022-02-08 RX ORDER — METOCLOPRAMIDE HYDROCHLORIDE 5 MG/ML
5 INJECTION INTRAMUSCULAR; INTRAVENOUS EVERY 6 HOURS PRN
Status: DISCONTINUED | OUTPATIENT
Start: 2022-02-08 | End: 2022-02-11 | Stop reason: HOSPADM

## 2022-02-08 RX ORDER — MORPHINE SULFATE 10 MG/ML
4 INJECTION INTRAMUSCULAR; INTRAVENOUS; SUBCUTANEOUS EVERY 5 MIN PRN
Status: DISCONTINUED | OUTPATIENT
Start: 2022-02-08 | End: 2022-02-08

## 2022-02-08 RX ORDER — MEPERIDINE HYDROCHLORIDE 25 MG/ML
25 INJECTION INTRAMUSCULAR; INTRAVENOUS; SUBCUTANEOUS EVERY 10 MIN PRN
Status: DISCONTINUED | OUTPATIENT
Start: 2022-02-08 | End: 2022-02-08

## 2022-02-08 RX ORDER — HYDROMORPHONE HYDROCHLORIDE 2 MG/ML
0.5 INJECTION, SOLUTION INTRAMUSCULAR; INTRAVENOUS; SUBCUTANEOUS EVERY 5 MIN PRN
Status: DISCONTINUED | OUTPATIENT
Start: 2022-02-08 | End: 2022-02-10 | Stop reason: HOSPADM

## 2022-02-08 RX ORDER — KETOROLAC TROMETHAMINE 30 MG/ML
15 INJECTION, SOLUTION INTRAMUSCULAR; INTRAVENOUS EVERY 6 HOURS PRN
Status: DISPENSED | OUTPATIENT
Start: 2022-02-08 | End: 2022-02-11

## 2022-02-08 RX ORDER — ESCITALOPRAM OXALATE 10 MG/1
10 TABLET ORAL DAILY
Status: DISCONTINUED | OUTPATIENT
Start: 2022-02-08 | End: 2022-02-11 | Stop reason: HOSPADM

## 2022-02-08 RX ORDER — CETIRIZINE HYDROCHLORIDE 10 MG/1
10 TABLET ORAL DAILY
Status: DISCONTINUED | OUTPATIENT
Start: 2022-02-08 | End: 2022-02-11 | Stop reason: HOSPADM

## 2022-02-08 RX ORDER — HYDROMORPHONE HYDROCHLORIDE 2 MG/ML
0.5 INJECTION, SOLUTION INTRAMUSCULAR; INTRAVENOUS; SUBCUTANEOUS
Status: COMPLETED | OUTPATIENT
Start: 2022-02-08 | End: 2022-02-08

## 2022-02-08 RX ORDER — FLUTICASONE PROPIONATE 50 MCG
1 SPRAY, SUSPENSION (ML) NASAL 2 TIMES DAILY
Status: DISCONTINUED | OUTPATIENT
Start: 2022-02-08 | End: 2022-02-11 | Stop reason: HOSPADM

## 2022-02-08 RX ORDER — SODIUM CHLORIDE, SODIUM LACTATE, POTASSIUM CHLORIDE, CALCIUM CHLORIDE 600; 310; 30; 20 MG/100ML; MG/100ML; MG/100ML; MG/100ML
125 INJECTION, SOLUTION INTRAVENOUS CONTINUOUS
Status: DISCONTINUED | OUTPATIENT
Start: 2022-02-08 | End: 2022-02-10

## 2022-02-08 RX ORDER — PANTOPRAZOLE SODIUM 40 MG/1
40 TABLET, DELAYED RELEASE ORAL DAILY
Status: DISCONTINUED | OUTPATIENT
Start: 2022-02-08 | End: 2022-02-11 | Stop reason: HOSPADM

## 2022-02-08 RX ORDER — HYDROXYZINE PAMOATE 25 MG/1
25 CAPSULE ORAL 2 TIMES DAILY PRN
Status: DISCONTINUED | OUTPATIENT
Start: 2022-02-08 | End: 2022-02-11 | Stop reason: HOSPADM

## 2022-02-08 RX ORDER — DEXTROSE MONOHYDRATE AND SODIUM CHLORIDE 5; .45 G/100ML; G/100ML
INJECTION, SOLUTION INTRAVENOUS CONTINUOUS
Status: DISCONTINUED | OUTPATIENT
Start: 2022-02-08 | End: 2022-02-11 | Stop reason: HOSPADM

## 2022-02-08 RX ORDER — HYDROCHLOROTHIAZIDE 12.5 MG/1
12.5 TABLET ORAL DAILY
Status: DISCONTINUED | OUTPATIENT
Start: 2022-02-08 | End: 2022-02-11 | Stop reason: HOSPADM

## 2022-02-08 RX ORDER — FENTANYL CITRATE 50 UG/ML
INJECTION, SOLUTION INTRAMUSCULAR; INTRAVENOUS
Status: DISCONTINUED | OUTPATIENT
Start: 2022-02-08 | End: 2022-02-08

## 2022-02-08 RX ORDER — SODIUM CHLORIDE, SODIUM LACTATE, POTASSIUM CHLORIDE, CALCIUM CHLORIDE 600; 310; 30; 20 MG/100ML; MG/100ML; MG/100ML; MG/100ML
INJECTION, SOLUTION INTRAVENOUS CONTINUOUS
Status: DISCONTINUED | OUTPATIENT
Start: 2022-02-08 | End: 2022-02-10

## 2022-02-08 RX ORDER — ONDANSETRON 2 MG/ML
4 INJECTION INTRAMUSCULAR; INTRAVENOUS EVERY 8 HOURS PRN
Status: DISCONTINUED | OUTPATIENT
Start: 2022-02-08 | End: 2022-02-10

## 2022-02-08 RX ORDER — IPRATROPIUM BROMIDE AND ALBUTEROL SULFATE 2.5; .5 MG/3ML; MG/3ML
3 SOLUTION RESPIRATORY (INHALATION)
Status: DISCONTINUED | OUTPATIENT
Start: 2022-02-08 | End: 2022-02-11 | Stop reason: HOSPADM

## 2022-02-08 RX ADMIN — FENTANYL CITRATE 50 MCG: 50 INJECTION INTRAMUSCULAR; INTRAVENOUS at 04:02

## 2022-02-08 RX ADMIN — DEXTROSE AND SODIUM CHLORIDE: 5; 450 INJECTION, SOLUTION INTRAVENOUS at 06:02

## 2022-02-08 RX ADMIN — MORPHINE SULFATE 4 MG: 4 INJECTION INTRAVENOUS at 06:02

## 2022-02-08 RX ADMIN — ENOXAPARIN SODIUM 40 MG: 40 INJECTION SUBCUTANEOUS at 09:02

## 2022-02-08 RX ADMIN — ONDANSETRON 8 MG: 2 INJECTION INTRAMUSCULAR; INTRAVENOUS at 01:02

## 2022-02-08 RX ADMIN — Medication 1 CAPSULE: at 09:02

## 2022-02-08 RX ADMIN — SODIUM CHLORIDE: 9 INJECTION, SOLUTION INTRAVENOUS at 01:02

## 2022-02-08 RX ADMIN — MIDAZOLAM 2 MG: 1 INJECTION INTRAMUSCULAR; INTRAVENOUS at 01:02

## 2022-02-08 RX ADMIN — LIDOCAINE HYDROCHLORIDE 100 MG: 20 INJECTION, SOLUTION INTRAVENOUS at 01:02

## 2022-02-08 RX ADMIN — ONDANSETRON 4 MG: 2 INJECTION INTRAMUSCULAR; INTRAVENOUS at 09:02

## 2022-02-08 RX ADMIN — TIZANIDINE 4 MG: 4 TABLET ORAL at 09:02

## 2022-02-08 RX ADMIN — CEFAZOLIN 2 G: 1 INJECTION, POWDER, FOR SOLUTION INTRAMUSCULAR; INTRAVENOUS; PARENTERAL at 01:02

## 2022-02-08 RX ADMIN — FENTANYL CITRATE 100 MCG: 50 INJECTION INTRAMUSCULAR; INTRAVENOUS at 01:02

## 2022-02-08 RX ADMIN — PIPERACILLIN AND TAZOBACTAM 4.5 G: 4; .5 INJECTION, POWDER, LYOPHILIZED, FOR SOLUTION INTRAVENOUS at 10:02

## 2022-02-08 RX ADMIN — FLUTICASONE PROPIONATE 50 MCG: 50 SPRAY, METERED NASAL at 09:02

## 2022-02-08 RX ADMIN — ROCURONIUM BROMIDE 25 MG: 10 INJECTION, SOLUTION INTRAVENOUS at 03:02

## 2022-02-08 RX ADMIN — SODIUM CHLORIDE, POTASSIUM CHLORIDE, SODIUM LACTATE AND CALCIUM CHLORIDE: 600; 310; 30; 20 INJECTION, SOLUTION INTRAVENOUS at 12:02

## 2022-02-08 RX ADMIN — ROCURONIUM BROMIDE 25 MG: 10 INJECTION, SOLUTION INTRAVENOUS at 04:02

## 2022-02-08 RX ADMIN — ROCURONIUM BROMIDE 50 MG: 10 INJECTION, SOLUTION INTRAVENOUS at 01:02

## 2022-02-08 RX ADMIN — PIPERACILLIN SODIUM AND TAZOBACTAM SODIUM 4.5 G: 4; .5 INJECTION, POWDER, LYOPHILIZED, FOR SOLUTION INTRAVENOUS at 11:02

## 2022-02-08 RX ADMIN — SUGAMMADEX 200 MG: 100 INJECTION, SOLUTION INTRAVENOUS at 05:02

## 2022-02-08 RX ADMIN — PROPOFOL 150 MG: 10 INJECTION, EMULSION INTRAVENOUS at 01:02

## 2022-02-08 RX ADMIN — HYDROMORPHONE HYDROCHLORIDE 0.5 MG: 2 INJECTION INTRAMUSCULAR; INTRAVENOUS; SUBCUTANEOUS at 09:02

## 2022-02-08 NOTE — ASSESSMENT & PLAN NOTE
02/08/2022 acute cholecystitis with cholelithiasis, iv abx zosyn IV hydration  lap casie per dr lira, instructed on risks benefits possible require open possible need drain or   Additional procedures, possible cholangiogram pain control she agrees to proceed with surgery

## 2022-02-08 NOTE — ANESTHESIA PROCEDURE NOTES
Intubation    Date/Time: 2/8/2022 1:54 PM  Performed by: Enrique Vegas CRNA  Authorized by: Mj Ascencio MD     Intubation:     Induction:  Intravenous    Intubated:  Postinduction    Mask Ventilation:  Easy mask    Attempts:  1    Attempted By:  CRNA    Method of Intubation:  Direct    Blade:  Krupa 4    Laryngeal View Grade: Grade I - full view of cords      Difficult Airway Encountered?: No      Complications:  None    Airway Device:  Oral endotracheal tube    Airway Device Size:  7.5    Style/Cuff Inflation:  Cuffed    Inflation Amount (mL):  7    Tube secured:  21    Secured at:  The lips    Placement Verified By:  Capnometry    Complicating Factors:  None    Findings Post-Intubation:  BS equal bilateral and atraumatic/condition of teeth unchanged

## 2022-02-08 NOTE — ED PROVIDER NOTES
Encounter Date: 2022       History     Chief Complaint   Patient presents with    Abdominal Pain     61 y/o female with right upper quadrant abdominal pain that began this morning.  She has been having pain intermittently, and she had an ultrasound yesterday that showed gallstones, but no cholecystitis.  She presents with recurrent severe pain in RUQ.  She has had some nausea as well.  She notes no remitting or exacerbating factors.          Review of patient's allergies indicates:  No Known Allergies  Past Medical History:   Diagnosis Date    Allergic rhinitis, mild     Anemia     Anxiety     Degenerative disc disease, cervical     Depression     GERD (gastroesophageal reflux disease)     Hyperlipidemia     Hypertension     Insomnia     Migraine     Vitamin D deficiency      Past Surgical History:   Procedure Laterality Date    ANGIOGRAM, CORONARY, WITH LEFT HEART CATHETERIZATION Left 2021    Procedure: Left heart cath w/ coronary angiograms;  Surgeon: Demi Carias MD;  Location: [facility] CATH LAB;  Service: Cardiology;  Laterality: Left;    INGUINAL HERNIA REPAIR Right      Family History   Problem Relation Age of Onset    Cancer Father     Hypertension Father     Diabetes Other     Arthritis Other      Social History     Tobacco Use    Smoking status: Former Smoker     Types: Cigarettes     Quit date: 10/2009     Years since quittin.3    Smokeless tobacco: Never Used   Substance Use Topics    Alcohol use: Never    Drug use: Never     Review of Systems   All other systems reviewed and are negative.      Physical Exam     Initial Vitals [22 0834]   BP Pulse Resp Temp SpO2   109/77 97 18 98.8 °F (37.1 °C) 100 %      MAP       --         Physical Exam    Nursing note and vitals reviewed.  Constitutional: She appears well-developed and well-nourished.   HENT:   Head: Normocephalic and atraumatic.   Nose: Nose normal.   Mouth/Throat: Oropharynx is clear and moist.    Eyes: Conjunctivae and EOM are normal. Pupils are equal, round, and reactive to light.   Neck: Neck supple.   Normal range of motion.  Cardiovascular: Normal rate, regular rhythm, normal heart sounds and intact distal pulses.   Pulmonary/Chest: Breath sounds normal.   Abdominal: Abdomen is soft. Bowel sounds are normal. There is abdominal tenderness.   Tender RUQ.     Musculoskeletal:         General: Normal range of motion.      Cervical back: Normal range of motion and neck supple.     Neurological: She is alert and oriented to person, place, and time. She has normal strength. GCS score is 15. GCS eye subscore is 4. GCS verbal subscore is 5. GCS motor subscore is 6.   Skin: Skin is warm and dry. Capillary refill takes less than 2 seconds.         Medical Screening Exam   See Full Note    ED Course   Procedures  Labs Reviewed   COMPREHENSIVE METABOLIC PANEL - Abnormal; Notable for the following components:       Result Value    Glucose 119 (*)     Creatinine 1.15 (*)     Albumin 3.0 (*)     Bilirubin, Total 1.6 (*)     Alk Phos 265 (*)     AST 76 (*)     eGFR 51 (*)     All other components within normal limits   LIPASE - Abnormal; Notable for the following components:    Lipase 50 (*)     All other components within normal limits   APTT - Abnormal; Notable for the following components:    PTT 40.1 (*)     All other components within normal limits   CBC WITH DIFFERENTIAL - Abnormal; Notable for the following components:    RBC 4.12 (*)     Hemoglobin 11.6 (*)     Hematocrit 34.0 (*)     MPV 9.3 (*)     Neutrophils % 76.1 (*)     Lymphocytes % 12.7 (*)     Monocytes % 9.1 (*)     Immature Granulocytes % 0.5 (*)     All other components within normal limits   PROTIME-INR - Normal   CBC W/ AUTO DIFFERENTIAL    Narrative:     The following orders were created for panel order CBC auto differential.  Procedure                               Abnormality         Status                     ---------                                -----------         ------                     CBC with Differential[[id number]]        Abnormal            Final result                 Please view results for these tests on the individual orders.   EXTRA TUBES    Narrative:     The following orders were created for panel order EXTRA TUBES.  Procedure                               Abnormality         Status                     ---------                               -----------         ------                     Light Green Top Hold[[id number]]                             In process                 Gold Top Hold[[id number]]                                    In process                   Please view results for these tests on the individual orders.   LIGHT GREEN TOP HOLD   GOLD TOP HOLD   SARS-COV-2 RNA AMPLIFICATION, QUAL          Imaging Results    None          Medications   albuterol inhaler 2 puff (has no administration in time range)   cetirizine tablet 10 mg (has no administration in time range)   fluticasone propionate 50 mcg/actuation nasal spray 50 mcg (has no administration in time range)   hydroCHLOROthiazide tablet 12.5 mg (has no administration in time range)   iron fum-B12-IF-C-folic acid capsule 1 capsule (has no administration in time range)   EScitalopram oxalate tablet 10 mg (has no administration in time range)   tiZANidine tablet 4 mg (has no administration in time range)   hydrOXYzine pamoate capsule 25 mg (has no administration in time range)   pantoprazole EC tablet 40 mg (has no administration in time range)   ondansetron disintegrating tablet 4 mg (has no administration in time range)   LIDOcaine (PF) 10 mg/ml (1%) injection 10 mg (has no administration in time range)   lactated ringers infusion (has no administration in time range)   enoxaparin injection 40 mg (has no administration in time range)   ketorolac injection 15 mg (has no administration in time range)   morphine injection 4 mg (has no administration in time range)    ondansetron injection 4 mg (has no administration in time range)   metoclopramide HCl injection 5 mg (has no administration in time range)   melatonin tablet 6 mg (has no administration in time range)   acetaminophen tablet 650 mg (has no administration in time range)   acetaminophen tablet 650 mg (has no administration in time range)   piperacillin-tazobactam (ZOSYN) 4.5 g in dextrose 5 % in water (D5W) 5 % 100 mL IVPB (MB+) (has no administration in time range)   HYDROmorphone (PF) injection 0.5 mg (0.5 mg Intravenous Given 2/8/22 0947)   ondansetron injection 4 mg (4 mg Intravenous Given 2/8/22 0947)   piperacillin-tazobactam (ZOSYN) 4.5 g in dextrose 5 % in water (D5W) 5 % 100 mL IVPB (MB+) (0 g Intravenous Stopped 2/8/22 1120)                       Clinical Impression:   Final diagnoses:  [K80.00] Calculus of gallbladder with acute cholecystitis without obstruction  [K80.50] Biliary colic (Primary)          ED Disposition Condition    Admit               Samson Daniels MD  02/08/22 1211

## 2022-02-08 NOTE — SUBJECTIVE & OBJECTIVE
No current facility-administered medications on file prior to encounter.     Current Outpatient Medications on File Prior to Encounter   Medication Sig    albuterol (PROVENTIL/VENTOLIN HFA) 90 mcg/actuation inhaler Inhale 2 puffs into the lungs every 6 (six) hours as needed for Wheezing. Rescue    budesonide-glycopyr-formoterol (BREZTRI AEROSPHERE) 160-9-4.8 mcg/actuation HFAA Inhale 2 puffs into the lungs once daily. (Patient not taking: Reported on 2/7/2022)    celecoxib (CELEBREX) 200 MG capsule Take 1 capsule (200 mg total) by mouth once daily.    cetirizine (ZYRTEC) 10 MG tablet Take 10 mg by mouth once daily.    ciprofloxacin HCl (CIPRO) 500 MG tablet Take 1 tablet (500 mg total) by mouth every 12 (twelve) hours.    EScitalopram oxalate (LEXAPRO) 10 MG tablet Take 1 tablet (10 mg total) by mouth once daily.    fluticasone propionate (FLONASE) 50 mcg/actuation nasal spray 1 spray by Each Nostril route 2 (two) times a day.    hydroCHLOROthiazide (HYDRODIURIL) 12.5 MG Tab Take 12.5 mg by mouth once daily.    HYDROcodone-acetaminophen (NORCO) 7.5-325 mg per tablet Take 1 tablet by mouth every 6 (six) hours as needed for Pain.    hydrOXYzine pamoate (VISTARIL) 25 MG Cap Take 1 capsule (25 mg total) by mouth 2 (two) times daily as needed (for anxiety).    iron fum-B12-IF-C-folic acid (FOLTRIN) 110-0.5 mg capsule Take 1 capsule by mouth 2 (two) times daily.    omeprazole (PRILOSEC) 20 MG capsule Take 1 capsule (20 mg total) by mouth 2 (two) times a day.    ondansetron (ZOFRAN-ODT) 4 MG TbDL Take 1 tablet (4 mg total) by mouth 2 (two) times daily.    pantoprazole (PROTONIX) 40 MG tablet Take 1 tablet (40 mg total) by mouth once daily.    sumatriptan (IMITREX) 100 MG tablet Take 1 tablet (100 mg total) by mouth every 2 (two) hours as needed for Migraine (do not take over 200 mg in 24 hrs). 1 tablet by mouth at onset of headache,may repeat once in 24 hours if no relief.    tiZANidine (ZANAFLEX) 4 MG  tablet Take 1 tablet (4 mg total) by mouth nightly.       Review of patient's allergies indicates:  No Known Allergies    Past Medical History:   Diagnosis Date    Allergic rhinitis, mild     Anemia     Anxiety     Degenerative disc disease, cervical     Depression     GERD (gastroesophageal reflux disease)     Hyperlipidemia     Hypertension     Insomnia     Migraine     Vitamin D deficiency      Past Surgical History:   Procedure Laterality Date    ANGIOGRAM, CORONARY, WITH LEFT HEART CATHETERIZATION Left 2021    Procedure: Left heart cath w/ coronary angiograms;  Surgeon: Demi Carias MD;  Location: [facility] CATH LAB;  Service: Cardiology;  Laterality: Left;    INGUINAL HERNIA REPAIR Right      Family History     Problem Relation (Age of Onset)    Arthritis Other    Cancer Father    Diabetes Other    Hypertension Father        Tobacco Use    Smoking status: Former Smoker     Types: Cigarettes     Quit date: 10/2009     Years since quittin.3    Smokeless tobacco: Never Used   Substance and Sexual Activity    Alcohol use: Never    Drug use: Never    Sexual activity: Yes     Partners: Male     Birth control/protection: Post-menopausal     Review of Systems   Constitutional: Positive for appetite change. Negative for fever.   HENT: Negative for congestion.    Respiratory: Negative for chest tightness and shortness of breath.    Cardiovascular: Negative for chest pain and leg swelling.   Gastrointestinal: Positive for abdominal pain, constipation and nausea. Negative for vomiting.   Genitourinary: Negative for difficulty urinating.   Musculoskeletal: Negative for arthralgias.   Skin: Negative for color change and wound.   Neurological: Negative for dizziness.     Objective:     Vital Signs (Most Recent):  Temp: 98.8 °F (37.1 °C) (22)  Pulse: 97 (22)  Resp: 16 (22)  BP: 109/77 (22)  SpO2: 100 % (22) Vital Signs (24h  Range):  Temp:  [98.8 °F (37.1 °C)] 98.8 °F (37.1 °C)  Pulse:  [80-97] 97  Resp:  [16-20] 16  SpO2:  [96 %-100 %] 100 %  BP: (109-160)/() 109/77     Weight: 73.9 kg (163 lb)  Body mass index is 29.81 kg/m².    Physical Exam  Vitals and nursing note reviewed.   HENT:      Head: Normocephalic.   Eyes:      Conjunctiva/sclera: Conjunctivae normal.   Cardiovascular:      Pulses: Normal pulses.   Pulmonary:      Effort: Pulmonary effort is normal.      Breath sounds: Normal breath sounds.   Abdominal:      General: Abdomen is flat.      Palpations: Abdomen is soft.      Tenderness: There is abdominal tenderness. There is guarding.      Comments: RUQ guarding   Musculoskeletal:         General: Normal range of motion.   Skin:     General: Skin is warm and dry.      Capillary Refill: Capillary refill takes less than 2 seconds.   Neurological:      General: No focal deficit present.      Mental Status: She is alert and oriented to person, place, and time.   Psychiatric:         Mood and Affect: Mood normal.         Significant Labs:  I have reviewed all pertinent lab results within the past 24 hours.  CBC:   Recent Labs   Lab 02/08/22 0912   WBC 8.57   RBC 4.12*   HGB 11.6*   HCT 34.0*      MCV 82.5   MCH 28.2   MCHC 34.1     BMP:   Recent Labs   Lab 02/08/22 0912   *      K 3.9      CO2 26   BUN 15   CREATININE 1.15*   CALCIUM 8.6     CMP:   Recent Labs   Lab 02/08/22 0912   *   CALCIUM 8.6   ALBUMIN 3.0*   PROT 6.7      K 3.9   CO2 26      BUN 15   CREATININE 1.15*   ALKPHOS 265*   ALT 46   AST 76*   BILITOT 1.6*     LFTs:   Recent Labs   Lab 02/08/22 0912   ALT 46   AST 76*   ALKPHOS 265*   BILITOT 1.6*   PROT 6.7   ALBUMIN 3.0*       Significant Diagnostics:  I have reviewed all pertinent imaging results/findings within the past 24 hours.

## 2022-02-08 NOTE — TRANSFER OF CARE
"Anesthesia Transfer of Care Note    Patient: Radha Crockett    Procedure(s) Performed: Procedure(s) (LRB):  CHOLECYSTECTOMY, LAPAROSCOPIC, WITH CHOLANGIOGRAM (N/A)    Patient location: PACU    Anesthesia Type: general    Transport from OR: Transported from OR on room air with adequate spontaneous ventilation    Post pain: adequate analgesia    Post assessment: no apparent anesthetic complications and tolerated procedure well    Post vital signs: stable    Level of consciousness: awake, alert and oriented    Nausea/Vomiting: no nausea/vomiting    Complications: none    Transfer of care protocol was followed      Last vitals:   Visit Vitals  /75   Pulse 109   Temp 37.4 °C (99.4 °F) (Axillary)   Resp 19   Ht 5' 2" (1.575 m)   Wt 73.9 kg (163 lb)   LMP  (LMP Unknown)   SpO2 98%   Breastfeeding No   BMI 29.81 kg/m²     "

## 2022-02-08 NOTE — Clinical Note
The catheter was inserted into the ostium   left main. Hemodynamics were performed.  An angiography was performed of the left coronary arteries. Multiple views were taken.

## 2022-02-08 NOTE — SUBJECTIVE & OBJECTIVE
No current facility-administered medications on file prior to encounter.     Current Outpatient Medications on File Prior to Encounter   Medication Sig    albuterol (PROVENTIL/VENTOLIN HFA) 90 mcg/actuation inhaler Inhale 2 puffs into the lungs every 6 (six) hours as needed for Wheezing. Rescue    budesonide-glycopyr-formoterol (BREZTRI AEROSPHERE) 160-9-4.8 mcg/actuation HFAA Inhale 2 puffs into the lungs once daily. (Patient not taking: Reported on 2/7/2022)    celecoxib (CELEBREX) 200 MG capsule Take 1 capsule (200 mg total) by mouth once daily.    cetirizine (ZYRTEC) 10 MG tablet Take 10 mg by mouth once daily.    ciprofloxacin HCl (CIPRO) 500 MG tablet Take 1 tablet (500 mg total) by mouth every 12 (twelve) hours.    EScitalopram oxalate (LEXAPRO) 10 MG tablet Take 1 tablet (10 mg total) by mouth once daily.    fluticasone propionate (FLONASE) 50 mcg/actuation nasal spray 1 spray by Each Nostril route 2 (two) times a day.    hydroCHLOROthiazide (HYDRODIURIL) 12.5 MG Tab Take 12.5 mg by mouth once daily.    HYDROcodone-acetaminophen (NORCO) 7.5-325 mg per tablet Take 1 tablet by mouth every 6 (six) hours as needed for Pain.    hydrOXYzine pamoate (VISTARIL) 25 MG Cap Take 1 capsule (25 mg total) by mouth 2 (two) times daily as needed (for anxiety).    iron fum-B12-IF-C-folic acid (FOLTRIN) 110-0.5 mg capsule Take 1 capsule by mouth 2 (two) times daily.    omeprazole (PRILOSEC) 20 MG capsule Take 1 capsule (20 mg total) by mouth 2 (two) times a day.    ondansetron (ZOFRAN-ODT) 4 MG TbDL Take 1 tablet (4 mg total) by mouth 2 (two) times daily.    pantoprazole (PROTONIX) 40 MG tablet Take 1 tablet (40 mg total) by mouth once daily.    sumatriptan (IMITREX) 100 MG tablet Take 1 tablet (100 mg total) by mouth every 2 (two) hours as needed for Migraine (do not take over 200 mg in 24 hrs). 1 tablet by mouth at onset of headache,may repeat once in 24 hours if no relief.    tiZANidine (ZANAFLEX) 4 MG  tablet Take 1 tablet (4 mg total) by mouth nightly.       Review of patient's allergies indicates:  No Known Allergies    Past Medical History:   Diagnosis Date    Allergic rhinitis, mild     Anemia     Anxiety     Degenerative disc disease, cervical     Depression     GERD (gastroesophageal reflux disease)     Hyperlipidemia     Hypertension     Insomnia     Migraine     Vitamin D deficiency      Past Surgical History:   Procedure Laterality Date    ANGIOGRAM, CORONARY, WITH LEFT HEART CATHETERIZATION Left 2021    Procedure: Left heart cath w/ coronary angiograms;  Surgeon: Demi Carias MD;  Location: [facility] CATH LAB;  Service: Cardiology;  Laterality: Left;    INGUINAL HERNIA REPAIR Right      Family History     Problem Relation (Age of Onset)    Arthritis Other    Cancer Father    Diabetes Other    Hypertension Father        Tobacco Use    Smoking status: Former Smoker     Types: Cigarettes     Quit date: 10/2009     Years since quittin.3    Smokeless tobacco: Never Used   Substance and Sexual Activity    Alcohol use: Never    Drug use: Never    Sexual activity: Yes     Partners: Male     Birth control/protection: Post-menopausal     Review of Systems  Objective:     Vital Signs (Most Recent):  Temp: 98.8 °F (37.1 °C) (22 08)  Pulse: 97 (22 08)  Resp: 16 (22 0947)  BP: 109/77 (22)  SpO2: 100 % (22) Vital Signs (24h Range):  Temp:  [98.8 °F (37.1 °C)] 98.8 °F (37.1 °C)  Pulse:  [80-97] 97  Resp:  [16-20] 16  SpO2:  [96 %-100 %] 100 %  BP: (109-160)/() 109/77     Weight: 73.9 kg (163 lb)  Body mass index is 29.81 kg/m².    Physical Exam  Vitals and nursing note reviewed.   HENT:      Head: Normocephalic.   Eyes:      Conjunctiva/sclera: Conjunctivae normal.   Cardiovascular:      Pulses: Normal pulses.   Pulmonary:      Effort: Pulmonary effort is normal.      Breath sounds: Normal breath sounds.   Abdominal:      General:  Abdomen is flat.      Palpations: Abdomen is soft.      Tenderness: There is no abdominal tenderness.      Comments: Appropriate tenderness at incisions   afua serous   Musculoskeletal:         General: Normal range of motion.   Skin:     General: Skin is warm and dry.      Capillary Refill: Capillary refill takes less than 2 seconds.   Neurological:      General: No focal deficit present.      Mental Status: She is alert and oriented to person, place, and time.   Psychiatric:         Mood and Affect: Mood normal.         Significant Labs:  I have reviewed all pertinent lab results within the past 24 hours.  CBC:   Recent Labs   Lab 02/09/22  0502   WBC 8.51   RBC 3.24*   HGB 9.1*   HCT 27.0*      MCV 83.3   MCH 28.1   MCHC 33.7     BMP:   Recent Labs   Lab 02/09/22  0502   *   *   K 3.9      CO2 21   BUN 17   CREATININE 1.51*   CALCIUM 7.1*       Significant Diagnostics:  I have reviewed all pertinent imaging results/findings within the past 24 hours.

## 2022-02-08 NOTE — H&P (VIEW-ONLY)
[facility] - Emergency Department  General Surgery  History and Physical  Patient Name: Radha Crockett  MRN: [id number]  Code Status: Full Code  Admission Date: 2/8/2022  Hospital Length of Stay: 0 days  Attending Physician: Samson Daniels MD  Primary Care Provider: Eric Benavides DO    Patient information was obtained from patient and ER records.     Consults  Subjective:     Principal Problem: Calculus of gallbladder without cholecystitis without obstruction    History of Present Illness: 62-year-old female 62-year-old female with symptoms of biliaryhas been having symptoms  of biliary colic no wall thickening  no white count PCP Dr. Benavides was setting up to see surgeon outpatient however she woke up with severe epigastric, RUQ pain nausea this am and presented to [facility] ER General surgery was consulted, she does have mile elevated T Bilirubin 1.6 with AST 76, other LFTS normal possibly elevated from dehydration      No current facility-administered medications on file prior to encounter.     Current Outpatient Medications on File Prior to Encounter   Medication Sig    albuterol (PROVENTIL/VENTOLIN HFA) 90 mcg/actuation inhaler Inhale 2 puffs into the lungs every 6 (six) hours as needed for Wheezing. Rescue    budesonide-glycopyr-formoterol (BREZTRI AEROSPHERE) 160-9-4.8 mcg/actuation HFAA Inhale 2 puffs into the lungs once daily. (Patient not taking: Reported on 2/7/2022)    celecoxib (CELEBREX) 200 MG capsule Take 1 capsule (200 mg total) by mouth once daily.    cetirizine (ZYRTEC) 10 MG tablet Take 10 mg by mouth once daily.    ciprofloxacin HCl (CIPRO) 500 MG tablet Take 1 tablet (500 mg total) by mouth every 12 (twelve) hours.    EScitalopram oxalate (LEXAPRO) 10 MG tablet Take 1 tablet (10 mg total) by mouth once daily.    fluticasone propionate (FLONASE) 50 mcg/actuation nasal spray 1 spray by Each Nostril route 2 (two) times a day.    hydroCHLOROthiazide (HYDRODIURIL) 12.5 MG Tab Take 12.5  mg by mouth once daily.    HYDROcodone-acetaminophen (NORCO) 7.5-325 mg per tablet Take 1 tablet by mouth every 6 (six) hours as needed for Pain.    hydrOXYzine pamoate (VISTARIL) 25 MG Cap Take 1 capsule (25 mg total) by mouth 2 (two) times daily as needed (for anxiety).    iron fum-B12-IF-C-folic acid (FOLTRIN) 110-0.5 mg capsule Take 1 capsule by mouth 2 (two) times daily.    omeprazole (PRILOSEC) 20 MG capsule Take 1 capsule (20 mg total) by mouth 2 (two) times a day.    ondansetron (ZOFRAN-ODT) 4 MG TbDL Take 1 tablet (4 mg total) by mouth 2 (two) times daily.    pantoprazole (PROTONIX) 40 MG tablet Take 1 tablet (40 mg total) by mouth once daily.    sumatriptan (IMITREX) 100 MG tablet Take 1 tablet (100 mg total) by mouth every 2 (two) hours as needed for Migraine (do not take over 200 mg in 24 hrs). 1 tablet by mouth at onset of headache,may repeat once in 24 hours if no relief.    tiZANidine (ZANAFLEX) 4 MG tablet Take 1 tablet (4 mg total) by mouth nightly.       Review of patient's allergies indicates:  No Known Allergies    Past Medical History:   Diagnosis Date    Allergic rhinitis, mild     Anemia     Anxiety     Degenerative disc disease, cervical     Depression     GERD (gastroesophageal reflux disease)     Hyperlipidemia     Hypertension     Insomnia     Migraine     Vitamin D deficiency      Past Surgical History:   Procedure Laterality Date    ANGIOGRAM, CORONARY, WITH LEFT HEART CATHETERIZATION Left 2021    Procedure: Left heart cath w/ coronary angiograms;  Surgeon: Demi Carias MD;  Location: [facility] CATH LAB;  Service: Cardiology;  Laterality: Left;    INGUINAL HERNIA REPAIR Right 1968     Family History     Problem Relation (Age of Onset)    Arthritis Other    Cancer Father    Diabetes Other    Hypertension Father        Tobacco Use    Smoking status: Former Smoker     Types: Cigarettes     Quit date: 10/2009     Years since quittin.3    Smokeless  tobacco: Never Used   Substance and Sexual Activity    Alcohol use: Never    Drug use: Never    Sexual activity: Yes     Partners: Male     Birth control/protection: Post-menopausal     Review of Systems   Constitutional: Positive for appetite change. Negative for fever.   HENT: Negative for congestion.    Respiratory: Negative for chest tightness and shortness of breath.    Cardiovascular: Negative for chest pain and leg swelling.   Gastrointestinal: Positive for abdominal pain, constipation and nausea. Negative for vomiting.   Genitourinary: Negative for difficulty urinating.   Musculoskeletal: Negative for arthralgias.   Skin: Negative for color change and wound.   Neurological: Negative for dizziness.     Objective:     Vital Signs (Most Recent):  Temp: 98.8 °F (37.1 °C) (02/08/22 0834)  Pulse: 97 (02/08/22 0834)  Resp: 16 (02/08/22 0947)  BP: 109/77 (02/08/22 0834)  SpO2: 100 % (02/08/22 0834) Vital Signs (24h Range):  Temp:  [98.8 °F (37.1 °C)] 98.8 °F (37.1 °C)  Pulse:  [80-97] 97  Resp:  [16-20] 16  SpO2:  [96 %-100 %] 100 %  BP: (109-160)/() 109/77     Weight: 73.9 kg (163 lb)  Body mass index is 29.81 kg/m².    Physical Exam  Vitals and nursing note reviewed.   HENT:      Head: Normocephalic.   Eyes:      Conjunctiva/sclera: Conjunctivae normal.   Cardiovascular:      Pulses: Normal pulses.   Pulmonary:      Effort: Pulmonary effort is normal.      Breath sounds: Normal breath sounds.   Abdominal:      General: Abdomen is flat.      Palpations: Abdomen is soft.      Tenderness: There is abdominal tenderness. There is guarding.      Comments: RUQ guarding   Musculoskeletal:         General: Normal range of motion.   Skin:     General: Skin is warm and dry.      Capillary Refill: Capillary refill takes less than 2 seconds.   Neurological:      General: No focal deficit present.      Mental Status: She is alert and oriented to person, place, and time.   Psychiatric:         Mood and Affect: Mood  normal.         Significant Labs:  I have reviewed all pertinent lab results within the past 24 hours.  CBC:   Recent Labs   Lab 02/08/22 0912   WBC 8.57   RBC 4.12*   HGB 11.6*   HCT 34.0*      MCV 82.5   MCH 28.2   MCHC 34.1     BMP:   Recent Labs   Lab 02/08/22 0912   *      K 3.9      CO2 26   BUN 15   CREATININE 1.15*   CALCIUM 8.6     CMP:   Recent Labs   Lab 02/08/22 0912   *   CALCIUM 8.6   ALBUMIN 3.0*   PROT 6.7      K 3.9   CO2 26      BUN 15   CREATININE 1.15*   ALKPHOS 265*   ALT 46   AST 76*   BILITOT 1.6*     LFTs:   Recent Labs   Lab 02/08/22 0912   ALT 46   AST 76*   ALKPHOS 265*   BILITOT 1.6*   PROT 6.7   ALBUMIN 3.0*       Significant Diagnostics:  I have reviewed all pertinent imaging results/findings within the past 24 hours.    Assessment/Plan: laparoscopic vs open cholecystectomy with intra operative cholangiogram today     * Calculus of gallbladder without cholecystitis without obstruction  02/08/2022 acute cholecystitis with cholelithiasis, iv abx zosyn IV hydration  lap casie per dr lira, instructed on risks benefits.     Risks and benefits of surgery discussed to include infection, bleeding, hernia, possible drain, duct injury, retained stones, open conversion, possible need for postop ERCP or further surgery, and unforeseen.    Patient expresses understanding and wishes to proceed    VTE Risk Mitigation (From admission, onward)         Ordered     enoxaparin injection 40 mg  Daily         02/08/22 1009     IP VTE LOW RISK PATIENT  Once         02/08/22 1009     Place sequential compression device  Until discontinued         02/08/22 1009                    KG Adrian  General Surgery  [facility] [place] - Emergency Department

## 2022-02-08 NOTE — Clinical Note
The radial band was applied to the right radial artery. 15 cc's of air were inserted into the closure device.

## 2022-02-08 NOTE — INTERVAL H&P NOTE
The patient has been examined and the H&P has been reviewed:    I concur with the findings and no changes have occurred since H&P was written.    Surgery risks, benefits and alternative options discussed and understood by patient/family.          Active Hospital Problems    Diagnosis  POA    *Calculus of gallbladder without cholecystitis without obstruction [K80.20]  Yes      Resolved Hospital Problems   No resolved problems to display.

## 2022-02-08 NOTE — ED TRIAGE NOTES
Pt here for abdominal pain, pt reports being seen 1 day ago and having work up. Pt reports having US gallbladder and being told to follow up with Dr Gonzalez

## 2022-02-08 NOTE — Clinical Note
The catheter was repositioned into the ostium   right coronary artery. Hemodynamics were performed.  An angiography was performed of the right coronary arteries. Multiple views were taken.

## 2022-02-08 NOTE — HPI
62-year-old female 62-year-old female with symptoms of biliaryhas been having symptoms  of biliary colic no wall thickening  no white count PCP Dr. Benavides was setting up to see surgeon outpatient however she woke up with severe epigastric, RUQ pain nausea this am and presented to [facility] ER General surgery was consulted, she does have mile elevated T Bilirubin 1.6 with AST 76, other LFTS normal possibly elevated from dehydration

## 2022-02-08 NOTE — ANESTHESIA PREPROCEDURE EVALUATION
02/08/2022  Radha Crockett is a 62 y.o., female.    Anesthesia Evaluation    I have reviewed the Patient Summary Reports.    I have reviewed the Nursing Notes. I have reviewed the NPO Status.   I have reviewed the Medications.     Review of Systems  Anesthesia Hx:  No problems with previous Anesthesia    Social:  Non-Smoker, No Alcohol Use    Hematology/Oncology:     Oncology Normal    -- Anemia:   EENT/Dental:EENT/Dental Normal   Cardiovascular:   Hypertension hyperlipidemia    Pulmonary:  Pulmonary Normal    Renal/:  Renal/ Normal     Hepatic/GI:   GERD    Musculoskeletal:   Arthritis     Neurological:   Headaches    Endocrine:  Endocrine Normal    Dermatological:  Skin Normal    Psych:  Psychiatric Normal           Physical Exam  General:  Well nourished    Airway/Jaw/Neck:  Airway Findings: Mouth Opening: Normal Mallampati: II     Eyes/Ears/Nose:  Eyes/Ears/Nose Findings:     Chest/Lungs:  Chest/Lungs Findings: Clear to auscultation     Heart/Vascular:  Heart Findings: Rate: Normal  Rhythm: Regular Rhythm        Mental Status:  Mental Status Findings:  Cooperative, Alert and Oriented         Anesthesia Plan  Type of Anesthesia, risks & benefits discussed:  Anesthesia Type:  general    Patient's Preference:   Plan Factors:          Intra-op Monitoring Plan: standard ASA monitors  Intra-op Monitoring Plan Comments:   Post Op Pain Control Plan: per primary service following discharge from PACU and multimodal analgesia  Post Op Pain Control Plan Comments:     Induction:   IV  Beta Blocker:  Patient is not currently on a Beta-Blocker (No further documentation required).       Informed Consent: Patient understands risks and agrees with Anesthesia plan.  Questions answered. Anesthesia consent signed with patient.  ASA Score: 2     Day of Surgery Review of History & Physical: I have interviewed and examined  the patient. I have reviewed the patient's H&P dated:  There are no significant changes.          Ready For Surgery From Anesthesia Perspective.

## 2022-02-08 NOTE — OP NOTE
[facility] - Periop Services     Operative Note    SUMMARY     Date of Procedure: 2/8/2022     Procedure: Procedure(s) (LRB):  SUBTOTAL CHOLECYSTECTOMY, LAPAROSCOPIC, WITH CHOLANGIOGRAM (N/A)     Surgeon(s) and Role:     * Steve Florez MD - Primary    Assisting Surgeon: None    Pre-Operative Diagnosis: Calculus of gallbladder with acute cholecystitis without obstruction [K80.00]    Post-Operative Diagnosis: Post-Op Diagnosis Codes:     * Calculus of gallbladder with acute cholecystitis without obstruction [K80.00]    Anesthesia: General    Technical Procedures Used: The patient was brought to the operating room placed in supine position. General anesthesia was administered. The abdomen was prepped and draped in sterile fashion. A supraumbilical incision was then performed and dissection was carried down to the fascia, which was sharply transected. A Mychal cannula was inserted. CO2 pneumoperitoneum was established. 5 mm trochars were then placed under direct visualization in the right upper quadrant, epigastrium and mid epigastrium. The fundus was retracted upward, and a needle was used to aspirate bile and reduce distension of the gallbladder. The fundus was retracted upward and then further retracted laterally. Dissection then proceeded in the area of the infundibulum.  There was a thick rind on the gallbladder and dissection was performed in the space between gallbladder serosa and rind, as much as possible. The suction and irrigation was used liberally. Dissection was performed from the top down.  Dense inflammation prohibited dissection of cystic duct.  Cystic artery was encountered and controlled with clamps. Surgicel was placed in liver bed for additional hemostasis.  Once gallbladder was mostly free, the gallbladder was transected at the infundibulum. Stones were removed from the gallbladder stump and cholangiogram was performed with findings as per above. A surgiloop was used to close the stump,  as well as a silk suture placed endosopially.  The right upper quadrant was irrigated with saline solution. Spilled stones and bile were suctioned free. AFUA drain was placed in liver bed and brought out through the RUQ port site. Drain secured with silk suture.  The gallbladder and stones were placed in an Endo Catch and removed through the supraumbilical site. Fascia was closed using interrupted PDS. All ports were then closed at the skin level with subcuticular Vicryl.  The patient was then awakened from anesthesia and transported to recovery room in stable condition.    Description of the Findings of the Procedure: Patient had acute cholecystitis with hydrops.  White bile was aspirated.  The cystic duct could not be safely exposed, so subtotal colecystectomy was performed, leaving approximately 1-2 cm of infundibulum. IOC showed dilated duct, two small filling defects and flow of dye into duodenum.  A afua drain was placed at conclusion.    Assistant(s): RACHEL Aguilera    Complications: No    Estimated Blood Loss (EBL): * No values recorded between 2/8/2022  2:03 PM and 2/8/2022  5:45 PM *           Implants: * No implants in log *    Specimens:   Specimen (24h ago, onward)             Start     Ordered    02/08/22 8304  Surgical Pathology  RELEASE UPON ORDERING         02/08/22 4414                 Condition: Good      Complications:  None

## 2022-02-08 NOTE — CONSULTS
[facility] - Emergency Department  General Surgery  History and Physical  Patient Name: Radha Crockett  MRN: [id number]  Code Status: Full Code  Admission Date: 2/8/2022  Hospital Length of Stay: 0 days  Attending Physician: Samson Daniels MD  Primary Care Provider: Eric Benavides DO    Patient information was obtained from patient and ER records.     Consults  Subjective:     Principal Problem: Calculus of gallbladder without cholecystitis without obstruction    History of Present Illness: 62-year-old female 62-year-old female with symptoms of biliaryhas been having symptoms  of biliary colic no wall thickening  no white count PCP Dr. Benavides was setting up to see surgeon outpatient however she woke up with severe epigastric, RUQ pain nausea this am and presented to [facility] ER General surgery was consulted, she does have mile elevated T Bilirubin 1.6 with AST 76, other LFTS normal possibly elevated from dehydration      No current facility-administered medications on file prior to encounter.     Current Outpatient Medications on File Prior to Encounter   Medication Sig    albuterol (PROVENTIL/VENTOLIN HFA) 90 mcg/actuation inhaler Inhale 2 puffs into the lungs every 6 (six) hours as needed for Wheezing. Rescue    budesonide-glycopyr-formoterol (BREZTRI AEROSPHERE) 160-9-4.8 mcg/actuation HFAA Inhale 2 puffs into the lungs once daily. (Patient not taking: Reported on 2/7/2022)    celecoxib (CELEBREX) 200 MG capsule Take 1 capsule (200 mg total) by mouth once daily.    cetirizine (ZYRTEC) 10 MG tablet Take 10 mg by mouth once daily.    ciprofloxacin HCl (CIPRO) 500 MG tablet Take 1 tablet (500 mg total) by mouth every 12 (twelve) hours.    EScitalopram oxalate (LEXAPRO) 10 MG tablet Take 1 tablet (10 mg total) by mouth once daily.    fluticasone propionate (FLONASE) 50 mcg/actuation nasal spray 1 spray by Each Nostril route 2 (two) times a day.    hydroCHLOROthiazide (HYDRODIURIL) 12.5 MG Tab Take 12.5  mg by mouth once daily.    HYDROcodone-acetaminophen (NORCO) 7.5-325 mg per tablet Take 1 tablet by mouth every 6 (six) hours as needed for Pain.    hydrOXYzine pamoate (VISTARIL) 25 MG Cap Take 1 capsule (25 mg total) by mouth 2 (two) times daily as needed (for anxiety).    iron fum-B12-IF-C-folic acid (FOLTRIN) 110-0.5 mg capsule Take 1 capsule by mouth 2 (two) times daily.    omeprazole (PRILOSEC) 20 MG capsule Take 1 capsule (20 mg total) by mouth 2 (two) times a day.    ondansetron (ZOFRAN-ODT) 4 MG TbDL Take 1 tablet (4 mg total) by mouth 2 (two) times daily.    pantoprazole (PROTONIX) 40 MG tablet Take 1 tablet (40 mg total) by mouth once daily.    sumatriptan (IMITREX) 100 MG tablet Take 1 tablet (100 mg total) by mouth every 2 (two) hours as needed for Migraine (do not take over 200 mg in 24 hrs). 1 tablet by mouth at onset of headache,may repeat once in 24 hours if no relief.    tiZANidine (ZANAFLEX) 4 MG tablet Take 1 tablet (4 mg total) by mouth nightly.       Review of patient's allergies indicates:  No Known Allergies    Past Medical History:   Diagnosis Date    Allergic rhinitis, mild     Anemia     Anxiety     Degenerative disc disease, cervical     Depression     GERD (gastroesophageal reflux disease)     Hyperlipidemia     Hypertension     Insomnia     Migraine     Vitamin D deficiency      Past Surgical History:   Procedure Laterality Date    ANGIOGRAM, CORONARY, WITH LEFT HEART CATHETERIZATION Left 2021    Procedure: Left heart cath w/ coronary angiograms;  Surgeon: Demi Carias MD;  Location: [facility] CATH LAB;  Service: Cardiology;  Laterality: Left;    INGUINAL HERNIA REPAIR Right 1968     Family History     Problem Relation (Age of Onset)    Arthritis Other    Cancer Father    Diabetes Other    Hypertension Father        Tobacco Use    Smoking status: Former Smoker     Types: Cigarettes     Quit date: 10/2009     Years since quittin.3    Smokeless  tobacco: Never Used   Substance and Sexual Activity    Alcohol use: Never    Drug use: Never    Sexual activity: Yes     Partners: Male     Birth control/protection: Post-menopausal     Review of Systems   Constitutional: Positive for appetite change. Negative for fever.   HENT: Negative for congestion.    Respiratory: Negative for chest tightness and shortness of breath.    Cardiovascular: Negative for chest pain and leg swelling.   Gastrointestinal: Positive for abdominal pain, constipation and nausea. Negative for vomiting.   Genitourinary: Negative for difficulty urinating.   Musculoskeletal: Negative for arthralgias.   Skin: Negative for color change and wound.   Neurological: Negative for dizziness.     Objective:     Vital Signs (Most Recent):  Temp: 98.8 °F (37.1 °C) (02/08/22 0834)  Pulse: 97 (02/08/22 0834)  Resp: 16 (02/08/22 0947)  BP: 109/77 (02/08/22 0834)  SpO2: 100 % (02/08/22 0834) Vital Signs (24h Range):  Temp:  [98.8 °F (37.1 °C)] 98.8 °F (37.1 °C)  Pulse:  [80-97] 97  Resp:  [16-20] 16  SpO2:  [96 %-100 %] 100 %  BP: (109-160)/() 109/77     Weight: 73.9 kg (163 lb)  Body mass index is 29.81 kg/m².    Physical Exam  Vitals and nursing note reviewed.   HENT:      Head: Normocephalic.   Eyes:      Conjunctiva/sclera: Conjunctivae normal.   Cardiovascular:      Pulses: Normal pulses.   Pulmonary:      Effort: Pulmonary effort is normal.      Breath sounds: Normal breath sounds.   Abdominal:      General: Abdomen is flat.      Palpations: Abdomen is soft.      Tenderness: There is abdominal tenderness. There is guarding.      Comments: RUQ guarding   Musculoskeletal:         General: Normal range of motion.   Skin:     General: Skin is warm and dry.      Capillary Refill: Capillary refill takes less than 2 seconds.   Neurological:      General: No focal deficit present.      Mental Status: She is alert and oriented to person, place, and time.   Psychiatric:         Mood and Affect: Mood  normal.         Significant Labs:  I have reviewed all pertinent lab results within the past 24 hours.  CBC:   Recent Labs   Lab 02/08/22 0912   WBC 8.57   RBC 4.12*   HGB 11.6*   HCT 34.0*      MCV 82.5   MCH 28.2   MCHC 34.1     BMP:   Recent Labs   Lab 02/08/22 0912   *      K 3.9      CO2 26   BUN 15   CREATININE 1.15*   CALCIUM 8.6     CMP:   Recent Labs   Lab 02/08/22 0912   *   CALCIUM 8.6   ALBUMIN 3.0*   PROT 6.7      K 3.9   CO2 26      BUN 15   CREATININE 1.15*   ALKPHOS 265*   ALT 46   AST 76*   BILITOT 1.6*     LFTs:   Recent Labs   Lab 02/08/22 0912   ALT 46   AST 76*   ALKPHOS 265*   BILITOT 1.6*   PROT 6.7   ALBUMIN 3.0*       Significant Diagnostics:  I have reviewed all pertinent imaging results/findings within the past 24 hours.    Assessment/Plan: laparoscopic vs open cholecystectomy with intra operative cholangiogram today     * Calculus of gallbladder without cholecystitis without obstruction  02/08/2022 acute cholecystitis with cholelithiasis, iv abx zosyn IV hydration  lap casie per dr lira, instructed on risks benefits.     Risks and benefits of surgery discussed to include infection, bleeding, hernia, possible drain, duct injury, retained stones, open conversion, possible need for postop ERCP or further surgery, and unforeseen.    Patient expresses understanding and wishes to proceed    VTE Risk Mitigation (From admission, onward)         Ordered     enoxaparin injection 40 mg  Daily         02/08/22 1009     IP VTE LOW RISK PATIENT  Once         02/08/22 1009     Place sequential compression device  Until discontinued         02/08/22 1009                    KG Adrian  General Surgery  [facility], [place] - Emergency Department

## 2022-02-09 PROBLEM — R94.31 ABNORMAL EKG: Status: ACTIVE | Noted: 2022-02-09

## 2022-02-09 PROBLEM — R79.89 ELEVATED TROPONIN: Status: ACTIVE | Noted: 2022-02-09

## 2022-02-09 PROBLEM — R06.02 SHORTNESS OF BREATH: Status: ACTIVE | Noted: 2022-02-09

## 2022-02-09 PROBLEM — Z99.81 HYPOXEMIA REQUIRING SUPPLEMENTAL OXYGEN: Status: ACTIVE | Noted: 2022-02-09

## 2022-02-09 PROBLEM — R09.02 HYPOXEMIA REQUIRING SUPPLEMENTAL OXYGEN: Status: ACTIVE | Noted: 2022-02-09

## 2022-02-09 LAB
ALBUMIN SERPL BCP-MCNC: 2.2 G/DL (ref 3.5–5)
ALBUMIN/GLOB SERPL: 0.8 {RATIO}
ALP SERPL-CCNC: 230 U/L (ref 50–130)
ALT SERPL W P-5'-P-CCNC: 41 U/L (ref 13–56)
ANION GAP SERPL CALCULATED.3IONS-SCNC: 14 MMOL/L (ref 7–16)
AORTIC ROOT ANNULUS: 2.7 CM
AORTIC VALVE CUSP SEPERATION: 1.83 CM
APTT PPP: 29.8 SECONDS (ref 25.2–37.3)
APTT PPP: 50.2 SECONDS (ref 25.2–37.3)
AST SERPL W P-5'-P-CCNC: 55 U/L (ref 15–37)
AV INDEX (PROSTH): 0.64
AV MEAN GRADIENT: 3 MMHG
AV PEAK GRADIENT: 4 MMHG
AV VALVE AREA: 1.46 CM2
AV VELOCITY RATIO: 0.7
BASOPHILS # BLD AUTO: 0.01 K/UL (ref 0–0.2)
BASOPHILS # BLD AUTO: 0.01 K/UL (ref 0–0.2)
BASOPHILS NFR BLD AUTO: 0.1 % (ref 0–1)
BASOPHILS NFR BLD AUTO: 0.1 % (ref 0–1)
BILIRUB DIRECT SERPL-MCNC: 0.8 MG/DL (ref 0–0.2)
BILIRUB SERPL-MCNC: 1 MG/DL (ref 0–1.2)
BSA FOR ECHO PROCEDURE: 1.8 M2
BUN SERPL-MCNC: 17 MG/DL (ref 7–18)
BUN/CREAT SERPL: 11 (ref 6–20)
CALCIUM SERPL-MCNC: 7.1 MG/DL (ref 8.5–10.1)
CHLORIDE SERPL-SCNC: 100 MMOL/L (ref 98–107)
CO2 SERPL-SCNC: 21 MMOL/L (ref 21–32)
CREAT SERPL-MCNC: 1.51 MG/DL (ref 0.55–1.02)
CV ECHO LV RWT: 0.41 CM
DIFFERENTIAL METHOD BLD: ABNORMAL
DIFFERENTIAL METHOD BLD: ABNORMAL
DOP CALC AO PEAK VEL: 1 M/S
DOP CALC AO VTI: 14 CM
DOP CALC LVOT AREA: 2.3 CM2
DOP CALC LVOT DIAMETER: 1.7 CM
DOP CALC LVOT PEAK VEL: 0.7 M/S
DOP CALC LVOT STROKE VOLUME: 20.42 CM3
DOP CALCLVOT PEAK VEL VTI: 9 CM
E WAVE DECELERATION TIME: 154 MSEC
ECHO EF ESTIMATED: 60 %
ECHO LV POSTERIOR WALL: 0.71 CM (ref 0.6–1.1)
EJECTION FRACTION: 70 %
EOSINOPHIL # BLD AUTO: 0 K/UL (ref 0–0.5)
EOSINOPHIL # BLD AUTO: 0 K/UL (ref 0–0.5)
EOSINOPHIL NFR BLD AUTO: 0 % (ref 1–4)
EOSINOPHIL NFR BLD AUTO: 0 % (ref 1–4)
ERYTHROCYTE [DISTWIDTH] IN BLOOD BY AUTOMATED COUNT: 13.8 % (ref 11.5–14.5)
ERYTHROCYTE [DISTWIDTH] IN BLOOD BY AUTOMATED COUNT: 13.9 % (ref 11.5–14.5)
FRACTIONAL SHORTENING: 53 % (ref 28–44)
GLOBULIN SER-MCNC: 2.9 G/DL (ref 2–4)
GLUCOSE SERPL-MCNC: 175 MG/DL (ref 74–106)
HCT VFR BLD AUTO: 27 % (ref 38–47)
HCT VFR BLD AUTO: 28.3 % (ref 38–47)
HGB BLD-MCNC: 9.1 G/DL (ref 12–16)
HGB BLD-MCNC: 9.4 G/DL (ref 12–16)
IMM GRANULOCYTES # BLD AUTO: 0.03 K/UL (ref 0–0.04)
IMM GRANULOCYTES # BLD AUTO: 0.04 K/UL (ref 0–0.04)
IMM GRANULOCYTES NFR BLD: 0.4 % (ref 0–0.4)
IMM GRANULOCYTES NFR BLD: 0.4 % (ref 0–0.4)
INR BLD: 1.11 (ref 0.9–1.1)
INTERVENTRICULAR SEPTUM: 0.94 CM (ref 0.6–1.1)
IVC OSTIUM: 1.9 CM
LEFT ATRIUM SIZE: 2.9 CM
LEFT INTERNAL DIMENSION IN SYSTOLE: 1.66 CM (ref 2.1–4)
LEFT VENTRICLE DIASTOLIC VOLUME INDEX: 24.49 ML/M2
LEFT VENTRICLE DIASTOLIC VOLUME: 42.85 ML
LEFT VENTRICLE MASS INDEX: 45 G/M2
LEFT VENTRICLE SYSTOLIC VOLUME INDEX: 9.5 ML/M2
LEFT VENTRICLE SYSTOLIC VOLUME: 16.58 ML
LEFT VENTRICULAR INTERNAL DIMENSION IN DIASTOLE: 3.5 CM (ref 3.5–6)
LEFT VENTRICULAR MASS: 78.57 G
LVOT MG: 1 MMHG
LYMPHOCYTES # BLD AUTO: 0.51 K/UL (ref 1–4.8)
LYMPHOCYTES # BLD AUTO: 0.79 K/UL (ref 1–4.8)
LYMPHOCYTES NFR BLD AUTO: 4.8 % (ref 27–41)
LYMPHOCYTES NFR BLD AUTO: 9.3 % (ref 27–41)
LYMPHOCYTES NFR BLD MANUAL: 6 % (ref 27–41)
MCH RBC QN AUTO: 28.1 PG (ref 27–31)
MCH RBC QN AUTO: 28.2 PG (ref 27–31)
MCHC RBC AUTO-ENTMCNC: 33.2 G/DL (ref 32–36)
MCHC RBC AUTO-ENTMCNC: 33.7 G/DL (ref 32–36)
MCV RBC AUTO: 83.3 FL (ref 80–96)
MCV RBC AUTO: 85 FL (ref 80–96)
MONOCYTES # BLD AUTO: 0.64 K/UL (ref 0–0.8)
MONOCYTES # BLD AUTO: 0.78 K/UL (ref 0–0.8)
MONOCYTES NFR BLD AUTO: 6.1 % (ref 2–6)
MONOCYTES NFR BLD AUTO: 9.2 % (ref 2–6)
MONOCYTES NFR BLD MANUAL: 2 % (ref 2–6)
MPC BLD CALC-MCNC: 9.8 FL (ref 9.4–12.4)
MPC BLD CALC-MCNC: 9.8 FL (ref 9.4–12.4)
MV PEAK E VEL: 0.71 M/S
NEUTROPHILS # BLD AUTO: 6.9 K/UL (ref 1.8–7.7)
NEUTROPHILS # BLD AUTO: 9.32 K/UL (ref 1.8–7.7)
NEUTROPHILS NFR BLD AUTO: 81 % (ref 53–65)
NEUTROPHILS NFR BLD AUTO: 88.6 % (ref 53–65)
NEUTS BAND NFR BLD MANUAL: 4 % (ref 1–5)
NEUTS SEG NFR BLD MANUAL: 88 % (ref 50–62)
NRBC # BLD AUTO: 0 X10E3/UL
NRBC # BLD AUTO: 0 X10E3/UL
NRBC, AUTO (.00): 0 %
NRBC, AUTO (.00): 0 %
NT-PROBNP SERPL-MCNC: 6212 PG/ML (ref 1–125)
PISA TR MAX VEL: 2.3 M/S
PLATELET # BLD AUTO: 201 K/UL (ref 150–400)
PLATELET # BLD AUTO: 241 K/UL (ref 150–400)
PLATELET MORPHOLOGY: ABNORMAL
POTASSIUM SERPL-SCNC: 3.9 MMOL/L (ref 3.5–5.1)
PROT SERPL-MCNC: 5.1 G/DL (ref 6.4–8.2)
PROTHROMBIN TIME: 14.3 SECONDS (ref 11.7–14.7)
RA MAJOR: 3.9 CM
RA PRESSURE: 15 MMHG
RBC # BLD AUTO: 3.24 M/UL (ref 4.2–5.4)
RBC # BLD AUTO: 3.33 M/UL (ref 4.2–5.4)
RBC MORPH BLD: NORMAL
RIGHT VENTRICULAR END-DIASTOLIC DIMENSION: 4.5 CM
SODIUM SERPL-SCNC: 131 MMOL/L (ref 136–145)
TR MAX PG: 21 MMHG
TRICUSPID ANNULAR PLANE SYSTOLIC EXCURSION: 1.6 CM
TROPONIN I SERPL HS-MCNC: 152 PG/ML
TROPONIN I SERPL HS-MCNC: 253.5 PG/ML
TROPONIN I SERPL HS-MCNC: 603.4 PG/ML
TV REST PULMONARY ARTERY PRESSURE: 36 MMHG
WBC # BLD AUTO: 10.52 K/UL (ref 4.5–11)
WBC # BLD AUTO: 8.51 K/UL (ref 4.5–11)

## 2022-02-09 PROCEDURE — 93010 ELECTROCARDIOGRAM REPORT: CPT | Mod: 77,,, | Performed by: INTERNAL MEDICINE

## 2022-02-09 PROCEDURE — 36415 COLL VENOUS BLD VENIPUNCTURE: CPT | Performed by: SURGERY

## 2022-02-09 PROCEDURE — 47563 PR LAP,CHOLECYSTECTOMY/GRAPH: ICD-10-PCS | Mod: ,,, | Performed by: SURGERY

## 2022-02-09 PROCEDURE — 93005 ELECTROCARDIOGRAM TRACING: CPT

## 2022-02-09 PROCEDURE — 25000242 PHARM REV CODE 250 ALT 637 W/ HCPCS: Performed by: SURGERY

## 2022-02-09 PROCEDURE — 25000003 PHARM REV CODE 250: Performed by: SURGERY

## 2022-02-09 PROCEDURE — 84484 ASSAY OF TROPONIN QUANT: CPT | Mod: 91 | Performed by: NURSE PRACTITIONER

## 2022-02-09 PROCEDURE — 85730 THROMBOPLASTIN TIME PARTIAL: CPT | Performed by: NURSE PRACTITIONER

## 2022-02-09 PROCEDURE — 84484 ASSAY OF TROPONIN QUANT: CPT | Performed by: NURSE PRACTITIONER

## 2022-02-09 PROCEDURE — 63600175 PHARM REV CODE 636 W HCPCS: Performed by: SURGERY

## 2022-02-09 PROCEDURE — 27000221 HC OXYGEN, UP TO 24 HOURS

## 2022-02-09 PROCEDURE — 85025 COMPLETE CBC W/AUTO DIFF WBC: CPT | Mod: 91 | Performed by: SURGERY

## 2022-02-09 PROCEDURE — 99900031 HC PATIENT EDUCATION (STAT)

## 2022-02-09 PROCEDURE — 25000003 PHARM REV CODE 250: Performed by: NURSE PRACTITIONER

## 2022-02-09 PROCEDURE — 94761 N-INVAS EAR/PLS OXIMETRY MLT: CPT

## 2022-02-09 PROCEDURE — 96361 HYDRATE IV INFUSION ADD-ON: CPT

## 2022-02-09 PROCEDURE — 47563 LAPARO CHOLECYSTECTOMY/GRAPH: CPT | Mod: ,,, | Performed by: SURGERY

## 2022-02-09 PROCEDURE — 96375 TX/PRO/DX INJ NEW DRUG ADDON: CPT

## 2022-02-09 PROCEDURE — 63600175 PHARM REV CODE 636 W HCPCS: Performed by: NURSE PRACTITIONER

## 2022-02-09 PROCEDURE — 83880 ASSAY OF NATRIURETIC PEPTIDE: CPT | Performed by: NURSE PRACTITIONER

## 2022-02-09 PROCEDURE — 93010 EKG 12-LEAD: ICD-10-PCS | Mod: 77,,, | Performed by: INTERNAL MEDICINE

## 2022-02-09 PROCEDURE — 93010 ELECTROCARDIOGRAM REPORT: CPT | Mod: ,,, | Performed by: HOSPITALIST

## 2022-02-09 PROCEDURE — 96366 THER/PROPH/DIAG IV INF ADDON: CPT

## 2022-02-09 PROCEDURE — 99900035 HC TECH TIME PER 15 MIN (STAT)

## 2022-02-09 PROCEDURE — 85610 PROTHROMBIN TIME: CPT | Performed by: NURSE PRACTITIONER

## 2022-02-09 PROCEDURE — 80053 COMPREHEN METABOLIC PANEL: CPT | Performed by: SURGERY

## 2022-02-09 PROCEDURE — 85730 THROMBOPLASTIN TIME PARTIAL: CPT | Mod: 91 | Performed by: NURSE PRACTITIONER

## 2022-02-09 PROCEDURE — S5010 5% DEXTROSE AND 0.45% SALINE: HCPCS | Performed by: NURSE PRACTITIONER

## 2022-02-09 PROCEDURE — 25500020 PHARM REV CODE 255: Performed by: SURGERY

## 2022-02-09 PROCEDURE — 93010 EKG 12-LEAD: ICD-10-PCS | Mod: ,,, | Performed by: HOSPITALIST

## 2022-02-09 PROCEDURE — 85025 COMPLETE CBC W/AUTO DIFF WBC: CPT | Performed by: SURGERY

## 2022-02-09 PROCEDURE — 82248 BILIRUBIN DIRECT: CPT | Performed by: SURGERY

## 2022-02-09 RX ORDER — HEPARIN SODIUM,PORCINE/D5W 25000/250
18 INTRAVENOUS SOLUTION INTRAVENOUS CONTINUOUS
Status: DISCONTINUED | OUTPATIENT
Start: 2022-02-09 | End: 2022-02-10

## 2022-02-09 RX ORDER — ENOXAPARIN SODIUM 100 MG/ML
40 INJECTION SUBCUTANEOUS EVERY 24 HOURS
Status: DISCONTINUED | OUTPATIENT
Start: 2022-02-09 | End: 2022-02-09

## 2022-02-09 RX ORDER — ALBUTEROL SULFATE 0.83 MG/ML
2.5 SOLUTION RESPIRATORY (INHALATION) EVERY 4 HOURS PRN
Status: DISCONTINUED | OUTPATIENT
Start: 2022-02-09 | End: 2022-02-11 | Stop reason: HOSPADM

## 2022-02-09 RX ORDER — FUROSEMIDE 10 MG/ML
20 INJECTION INTRAMUSCULAR; INTRAVENOUS ONCE
Status: COMPLETED | OUTPATIENT
Start: 2022-02-09 | End: 2022-02-09

## 2022-02-09 RX ADMIN — Medication 1 CAPSULE: at 10:02

## 2022-02-09 RX ADMIN — PIPERACILLIN SODIUM AND TAZOBACTAM SODIUM 4.5 G: 4; .5 INJECTION, POWDER, LYOPHILIZED, FOR SOLUTION INTRAVENOUS at 05:02

## 2022-02-09 RX ADMIN — ACETAMINOPHEN 650 MG: 325 TABLET ORAL at 09:02

## 2022-02-09 RX ADMIN — PIPERACILLIN SODIUM AND TAZOBACTAM SODIUM 4.5 G: 4; .5 INJECTION, POWDER, LYOPHILIZED, FOR SOLUTION INTRAVENOUS at 10:02

## 2022-02-09 RX ADMIN — FLUTICASONE PROPIONATE 50 MCG: 50 SPRAY, METERED NASAL at 09:02

## 2022-02-09 RX ADMIN — ESCITALOPRAM OXALATE 10 MG: 10 TABLET ORAL at 09:02

## 2022-02-09 RX ADMIN — SODIUM CHLORIDE, POTASSIUM CHLORIDE, SODIUM LACTATE AND CALCIUM CHLORIDE: 600; 310; 30; 20 INJECTION, SOLUTION INTRAVENOUS at 03:02

## 2022-02-09 RX ADMIN — CETIRIZINE HYDROCHLORIDE 10 MG: 10 TABLET, FILM COATED ORAL at 09:02

## 2022-02-09 RX ADMIN — Medication 1 CAPSULE: at 09:02

## 2022-02-09 RX ADMIN — FUROSEMIDE 20 MG: 10 INJECTION, SOLUTION INTRAMUSCULAR; INTRAVENOUS at 03:02

## 2022-02-09 RX ADMIN — DEXTROSE AND SODIUM CHLORIDE: 5; 450 INJECTION, SOLUTION INTRAVENOUS at 07:02

## 2022-02-09 RX ADMIN — PANTOPRAZOLE SODIUM 40 MG: 40 TABLET, DELAYED RELEASE ORAL at 09:02

## 2022-02-09 RX ADMIN — SODIUM CHLORIDE 1000 ML: 9 INJECTION, SOLUTION INTRAVENOUS at 01:02

## 2022-02-09 RX ADMIN — IOPAMIDOL 100 ML: 755 INJECTION, SOLUTION INTRAVENOUS at 04:02

## 2022-02-09 RX ADMIN — HYDROCODONE BITARTRATE AND ACETAMINOPHEN 1 TABLET: 7.5; 325 TABLET ORAL at 09:02

## 2022-02-09 NOTE — SUBJECTIVE & OBJECTIVE
Past Medical History:   Diagnosis Date    Allergic rhinitis, mild     Anemia     Anxiety     Degenerative disc disease, cervical     Depression     GERD (gastroesophageal reflux disease)     Hyperlipidemia     Hypertension     Insomnia     Migraine     Vitamin D deficiency        Past Surgical History:   Procedure Laterality Date    ANGIOGRAM, CORONARY, WITH LEFT HEART CATHETERIZATION Left 9/17/2021    Procedure: Left heart cath w/ coronary angiograms;  Surgeon: Demi Carias MD;  Location: [facility] CATH LAB;  Service: Cardiology;  Laterality: Left;    INGUINAL HERNIA REPAIR Right 1968    LAPAROSCOPIC CHOLECYSTECTOMY WITH CHOLANGIOGRAPHY N/A 2/8/2022    Procedure: CHOLECYSTECTOMY, LAPAROSCOPIC, WITH CHOLANGIOGRAM;  Surgeon: Steve Florez MD;  Location: [facility] OR;  Service: General;  Laterality: N/A;       Review of patient's allergies indicates:  No Known Allergies    No current facility-administered medications on file prior to encounter.     Current Outpatient Medications on File Prior to Encounter   Medication Sig    albuterol (PROVENTIL/VENTOLIN HFA) 90 mcg/actuation inhaler Inhale 2 puffs into the lungs every 6 (six) hours as needed for Wheezing. Rescue    budesonide-glycopyr-formoterol (BREZTRI AEROSPHERE) 160-9-4.8 mcg/actuation HFAA Inhale 2 puffs into the lungs once daily. (Patient not taking: Reported on 2/7/2022)    celecoxib (CELEBREX) 200 MG capsule Take 1 capsule (200 mg total) by mouth once daily.    cetirizine (ZYRTEC) 10 MG tablet Take 10 mg by mouth once daily.    ciprofloxacin HCl (CIPRO) 500 MG tablet Take 1 tablet (500 mg total) by mouth every 12 (twelve) hours.    EScitalopram oxalate (LEXAPRO) 10 MG tablet Take 1 tablet (10 mg total) by mouth once daily.    fluticasone propionate (FLONASE) 50 mcg/actuation nasal spray 1 spray by Each Nostril route 2 (two) times a day.    hydroCHLOROthiazide (HYDRODIURIL) 12.5 MG Tab Take 12.5 mg by mouth once daily.     HYDROcodone-acetaminophen (NORCO) 7.5-325 mg per tablet Take 1 tablet by mouth every 6 (six) hours as needed for Pain.    hydrOXYzine pamoate (VISTARIL) 25 MG Cap Take 1 capsule (25 mg total) by mouth 2 (two) times daily as needed (for anxiety).    iron fum-B12-IF-C-folic acid (FOLTRIN) 110-0.5 mg capsule Take 1 capsule by mouth 2 (two) times daily.    omeprazole (PRILOSEC) 20 MG capsule Take 1 capsule (20 mg total) by mouth 2 (two) times a day.    ondansetron (ZOFRAN-ODT) 4 MG TbDL Take 1 tablet (4 mg total) by mouth 2 (two) times daily.    pantoprazole (PROTONIX) 40 MG tablet Take 1 tablet (40 mg total) by mouth once daily.    sumatriptan (IMITREX) 100 MG tablet Take 1 tablet (100 mg total) by mouth every 2 (two) hours as needed for Migraine (do not take over 200 mg in 24 hrs). 1 tablet by mouth at onset of headache,may repeat once in 24 hours if no relief.    tiZANidine (ZANAFLEX) 4 MG tablet Take 1 tablet (4 mg total) by mouth nightly.     Family History     Problem Relation (Age of Onset)    Arthritis Other    Cancer Father    Diabetes Other    Hypertension Father        Tobacco Use    Smoking status: Former Smoker     Types: Cigarettes     Quit date: 10/2009     Years since quittin.3    Smokeless tobacco: Never Used   Substance and Sexual Activity    Alcohol use: Never    Drug use: Never    Sexual activity: Yes     Partners: Male     Birth control/protection: Post-menopausal     Review of Systems   Constitutional: Negative for chills and fever.   Cardiovascular: Positive for chest pain. Negative for leg swelling, orthopnea and palpitations.   Respiratory: Positive for cough, shortness of breath and sleep disturbances due to breathing.    Gastrointestinal: Positive for abdominal pain.        S/P laparoscopic cholecystectomy 2022.   All other systems reviewed and are negative.    Objective:     Vital Signs (Most Recent):  Temp: 98.7 °F (37.1 °C) (22 1200)  Pulse: 92 (22  0710)  Resp: 18 (02/09/22 1200)  BP: 106/61 (02/09/22 0710)  SpO2: (!) 90 % (02/09/22 1200) Vital Signs (24h Range):  Temp:  [98.3 °F (36.8 °C)-100.8 °F (38.2 °C)] 98.7 °F (37.1 °C)  Pulse:  [] 92  Resp:  [16-20] 18  SpO2:  [88 %-99 %] 90 %  BP: ()/(51-81) 106/61     Weight: 73.9 kg (163 lb)  Body mass index is 29.81 kg/m².    SpO2: (!) 90 %  O2 Device (Oxygen Therapy): nasal cannula      Intake/Output Summary (Last 24 hours) at 2/9/2022 1640  Last data filed at 2/9/2022 0118  Gross per 24 hour   Intake --   Output 40 ml   Net -40 ml       Lines/Drains/Airways     Drain                 Closed/Suction Drain 02/08/22 1715 RUQ Bulb <1 day          Peripheral Intravenous Line                 Peripheral IV - Single Lumen 02/08/22 0946 20 G Left Antecubital 1 day                Physical Exam  Vitals reviewed.   Constitutional:       General: She is not in acute distress.     Appearance: She is not toxic-appearing or diaphoretic.   HENT:      Nose: Nose normal. No congestion.      Mouth/Throat:      Mouth: Mucous membranes are moist.      Pharynx: Oropharynx is clear.   Eyes:      General: No scleral icterus.     Pupils: Pupils are equal, round, and reactive to light.   Cardiovascular:      Rate and Rhythm: Normal rate and regular rhythm.      Pulses: Normal pulses.      Heart sounds: Normal heart sounds. No murmur heard.      Pulmonary:      Effort: Tachypnea present.      Breath sounds: No wheezing, rhonchi or rales.   Abdominal:      General: Bowel sounds are normal.      Palpations: Abdomen is soft.      Tenderness: There is abdominal tenderness.   Musculoskeletal:      Cervical back: Neck supple.      Right lower leg: No edema.      Left lower leg: No edema.   Skin:     General: Skin is warm and dry.      Coloration: Skin is not jaundiced.   Neurological:      Mental Status: She is alert and oriented to person, place, and time.         Significant Labs:   ABG: No results for input(s): PH, PCO2, HCO3,  POCSATURATED, BE in the last 48 hours., Blood Culture: No results for input(s): LABBLOO in the last 48 hours., BMP:   Recent Labs   Lab 02/08/22  0912 02/09/22  0502   * 175*    131*   K 3.9 3.9    100   CO2 26 21   BUN 15 17   CREATININE 1.15* 1.51*   CALCIUM 8.6 7.1*   , CMP   Recent Labs   Lab 02/08/22  0912 02/09/22  0502    131*   K 3.9 3.9    100   CO2 26 21   * 175*   BUN 15 17   CREATININE 1.15* 1.51*   CALCIUM 8.6 7.1*   PROT 6.7 5.1*   ALBUMIN 3.0* 2.2*   BILITOT 1.6* 1.0   ALKPHOS 265* 230*   AST 76* 55*   ALT 46 41   ANIONGAP 14 14   EGFRNONAA 51* 37*   , CBC   Recent Labs   Lab 02/08/22  0912 02/08/22  0912 02/09/22  0007 02/09/22  0007 02/09/22  0502   WBC 8.57  --  10.52  --  8.51   HGB 11.6*  --  9.4*  --  9.1*   HCT 34.0*   < > 28.3*   < > 27.0*     --  241  --  201    < > = values in this interval not displayed.   , INR   Recent Labs   Lab 02/08/22  0912 02/09/22  1539   INR 0.94 1.11*   , Lipid Panel No results for input(s): CHOL, HDL, LDLCALC, TRIG, CHOLHDL in the last 48 hours. and Troponin No results for input(s): TROPONINI in the last 48 hours.    Significant Imaging: Cardiac Cath: [facility] 9/17/2021 with clean coronaries, Echocardiogram:   Transthoracic echo (TTE) complete (Cupid Only):   Results for orders placed or performed during the hospital encounter of 02/08/22   Echo   Result Value Ref Range    BSA 1.8 m2    Right Atrial Pressure (from IVC) 15 mmHg    EF 70 %    Left Ventricular Outflow Tract Mean Gradient 1.00 mmHg    AORTIC VALVE CUSP SEPERATION 1.83 cm    LVIDd 3.50 (A) 3.5 - 6.0 cm    IVS 0.94 0.6 - 1.1 cm    Posterior Wall 0.71 0.6 - 1.1 cm    Ao root annulus 2.70 cm    LVIDs 1.66 (A) 2.1 - 4.0 cm    FS 53 28 - 44 %    IVC ostium 1.9 cm    LV mass 78.57 g    LA size 2.90 cm    RVDD 4.50 cm    TAPSE 1.60 cm    Left Ventricle Relative Wall Thickness 0.41 cm    AV mean gradient 3 mmHg    AV valve area 1.46 cm2    AV Velocity Ratio 0.70     AV  index (prosthetic) 0.64     E wave deceleration time 154 msec    LVOT diameter 1.70 cm    LVOT area 2.3 cm2    LVOT peak pankaj 0.7 m/s    LVOT peak VTI 9.00 cm    Ao peak pankaj 1.0 m/s    Ao VTI 14.00 cm    LVOT stroke volume 20.42 cm3    AV peak gradient 4 mmHg    TV rest pulmonary artery pressure 36 mmHg    MV Peak E Pankaj 0.71 m/s    TR Max Pankaj 2.30 m/s    LV Systolic Volume 16.58 mL    LV Systolic Volume Index 9.5 mL/m2    LV Diastolic Volume 42.85 mL    LV Diastolic Volume Index 24.49 mL/m2    LV Mass Index 45 g/m2    Echo EF Estimated 60 %    RA Major Axis 3.90 cm    Triscuspid Valve Regurgitation Peak Gradient 21 mmHg    Narrative    · Sinus rhythm, rate 95.  · The left ventricle is normal in size with normal systolic function.  · The estimated ejection fraction is 70%.  · Normal left ventricular diastolic function.  · Atrial fibrillation not observed.  · Moderate right ventricular enlargement.  · Moderate right atrial enlargement.  · Mild tricuspid regurgitation.  · Elevated central venous pressure (15 mmHg).  · The estimated PA systolic pressure is 36 mmHg.  · Trivial pericardial effusion.      , EKG: SR with diffuse ST elevations, reviewed by Dr. Camargo and X-Ray: CXR: X-Ray Chest 1 View (CXR):   Results for orders placed or performed during the hospital encounter of 02/08/22   X-Ray Chest 1 View    Narrative    EXAMINATION:  XR CHEST 1 VIEW    CLINICAL HISTORY:  shortness of breath;    TECHNIQUE:  Single frontal view of the chest was performed.    COMPARISON:  06/09/2021    FINDINGS:  There is a patchy density in the right lung base and minimally in the left lung base.  The upper lungs are clear.  No pneumothorax.  No pleural effusion.      Impression    Bibasilar atelectatic changes.      Electronically signed by: Kendall Ray  Date:    02/09/2022  Time:    10:00

## 2022-02-09 NOTE — NURSING
11:41 notified Dr Florez of 77/51 blood pressure; Dr. Florez ordered STAT CBC and to call back after results came back    00:51  Notified Dr. Florez of H/H of 9.4 and 28.3 and will order NS bolus of 1000ml at 500 ml/hr    0300 Notified Dr. Florez of BP after bolus is 94/56 and no further orders and to continue to monitor

## 2022-02-09 NOTE — ASSESSMENT & PLAN NOTE
- Patient seen and evaluated by Dr. Camargo  - EKG SR with diffuse ST elevation without reciprocal changes  - [facility] 5 months ago 9/17/2021 with clean coronaries  - Stat echo performed which revealed normal systolic function, EF 70%, no wall motion abnormality, moderate right atrial and right ventricular enlargement.  - Troponin 152, will continue trending, BNP 6200, O2 sat 87-90% on 3L NC when patient is talking  - Findings above are very suspicious for PE and not ACS, start IV heparin PE protocol and ordered stat CT PE  - Pending CT results, will consider pulmonary thrombectomy if needed  - Strict bedrest for now, telemetry monitoring and O2 monitoring

## 2022-02-09 NOTE — CONSULTS
[facility] - Orthopedic  Cardiology  Consult Note    Patient Name: Radha Crockett  MRN: [id number]  Admission Date: 2/8/2022  Hospital Length of Stay: 0 days  Code Status: Full Code   Attending Provider: Steve Florez MD   Consulting Provider: CINDY Recinos  Primary Care Physician: Eric Benavides DO  Principal Problem:Calculus of gallbladder without cholecystitis without obstruction    Patient information was obtained from patient, past medical records and ER records.     Inpatient consult to Cardiology  Consult performed by: CINDY Recinos  Consult ordered by: KG Adrian  Reason for consult: elevated troponin and abnormal EKG        Subjective:     Chief Complaint:  Right upper quadrant pain and nausea     HPI:   63 y/o female with PMH of HTN, HLD, GERD, anemia, depression and anxiety who presented to ED yesterday with severe right upper quadrant pain and nausea. She had history of gallstones and was set up to see a surgeon outpatient but developed severe pain and nausea the morning of 2/8/2022, presented to ED and underwent laparoscopic cholecystectomy that afternoon. Today cardiology consulted for elevated troponin and abnormal EKG.    Patient reports she became very short of breath overnight with coughing episodes, she has been experiencing midsternal chest discomfort and left upper chest/shoulder pain since surgery yesterday (believes this may be related to air from laparoscopic procedure). BP presently 102/64 and O2 sat 87-90% on 3L NC. EKG shows diffuse ST elevation. Troponin 152 and BNP 6,200. Echo with normal systolic function, EF 70% with moderate right ventricular and right atrial enlargement. LHC performed 5 months ago in 9/2021 with clean coronary arteries.      Past Medical History:   Diagnosis Date    Allergic rhinitis, mild     Anemia     Anxiety     Degenerative disc disease, cervical     Depression     GERD (gastroesophageal reflux disease)     Hyperlipidemia      Hypertension     Insomnia     Migraine     Vitamin D deficiency        Past Surgical History:   Procedure Laterality Date    ANGIOGRAM, CORONARY, WITH LEFT HEART CATHETERIZATION Left 9/17/2021    Procedure: Left heart cath w/ coronary angiograms;  Surgeon: Demi Carias MD;  Location: [facility] CATH LAB;  Service: Cardiology;  Laterality: Left;    INGUINAL HERNIA REPAIR Right 1968    LAPAROSCOPIC CHOLECYSTECTOMY WITH CHOLANGIOGRAPHY N/A 2/8/2022    Procedure: CHOLECYSTECTOMY, LAPAROSCOPIC, WITH CHOLANGIOGRAM;  Surgeon: Steve Florez MD;  Location: [facility] OR;  Service: General;  Laterality: N/A;       Review of patient's allergies indicates:  No Known Allergies    No current facility-administered medications on file prior to encounter.     Current Outpatient Medications on File Prior to Encounter   Medication Sig    albuterol (PROVENTIL/VENTOLIN HFA) 90 mcg/actuation inhaler Inhale 2 puffs into the lungs every 6 (six) hours as needed for Wheezing. Rescue    budesonide-glycopyr-formoterol (BREZTRI AEROSPHERE) 160-9-4.8 mcg/actuation HFAA Inhale 2 puffs into the lungs once daily. (Patient not taking: Reported on 2/7/2022)    celecoxib (CELEBREX) 200 MG capsule Take 1 capsule (200 mg total) by mouth once daily.    cetirizine (ZYRTEC) 10 MG tablet Take 10 mg by mouth once daily.    ciprofloxacin HCl (CIPRO) 500 MG tablet Take 1 tablet (500 mg total) by mouth every 12 (twelve) hours.    EScitalopram oxalate (LEXAPRO) 10 MG tablet Take 1 tablet (10 mg total) by mouth once daily.    fluticasone propionate (FLONASE) 50 mcg/actuation nasal spray 1 spray by Each Nostril route 2 (two) times a day.    hydroCHLOROthiazide (HYDRODIURIL) 12.5 MG Tab Take 12.5 mg by mouth once daily.    HYDROcodone-acetaminophen (NORCO) 7.5-325 mg per tablet Take 1 tablet by mouth every 6 (six) hours as needed for Pain.    hydrOXYzine pamoate (VISTARIL) 25 MG Cap Take 1 capsule (25 mg total) by mouth 2 (two) times daily as  needed (for anxiety).    iron fum-B12-IF-C-folic acid (FOLTRIN) 110-0.5 mg capsule Take 1 capsule by mouth 2 (two) times daily.    omeprazole (PRILOSEC) 20 MG capsule Take 1 capsule (20 mg total) by mouth 2 (two) times a day.    ondansetron (ZOFRAN-ODT) 4 MG TbDL Take 1 tablet (4 mg total) by mouth 2 (two) times daily.    pantoprazole (PROTONIX) 40 MG tablet Take 1 tablet (40 mg total) by mouth once daily.    sumatriptan (IMITREX) 100 MG tablet Take 1 tablet (100 mg total) by mouth every 2 (two) hours as needed for Migraine (do not take over 200 mg in 24 hrs). 1 tablet by mouth at onset of headache,may repeat once in 24 hours if no relief.    tiZANidine (ZANAFLEX) 4 MG tablet Take 1 tablet (4 mg total) by mouth nightly.     Family History     Problem Relation (Age of Onset)    Arthritis Other    Cancer Father    Diabetes Other    Hypertension Father        Tobacco Use    Smoking status: Former Smoker     Types: Cigarettes     Quit date: 10/2009     Years since quittin.3    Smokeless tobacco: Never Used   Substance and Sexual Activity    Alcohol use: Never    Drug use: Never    Sexual activity: Yes     Partners: Male     Birth control/protection: Post-menopausal     Review of Systems   Constitutional: Negative for chills and fever.   Cardiovascular: Positive for chest pain. Negative for leg swelling, orthopnea and palpitations.   Respiratory: Positive for cough, shortness of breath and sleep disturbances due to breathing.    Gastrointestinal: Positive for abdominal pain.        S/P laparoscopic cholecystectomy 2022.   All other systems reviewed and are negative.    Objective:     Vital Signs (Most Recent):  Temp: 98.7 °F (37.1 °C) (22 1200)  Pulse: 92 (22 0710)  Resp: 18 (22 1200)  BP: 106/61 (22 0710)  SpO2: (!) 90 % (22 1200) Vital Signs (24h Range):  Temp:  [98.3 °F (36.8 °C)-100.8 °F (38.2 °C)] 98.7 °F (37.1 °C)  Pulse:  [] 92  Resp:  [16-20] 18  SpO2:  [88  %-99 %] 90 %  BP: ()/(51-81) 106/61     Weight: 73.9 kg (163 lb)  Body mass index is 29.81 kg/m².    SpO2: (!) 90 %  O2 Device (Oxygen Therapy): nasal cannula      Intake/Output Summary (Last 24 hours) at 2/9/2022 1640  Last data filed at 2/9/2022 0118  Gross per 24 hour   Intake --   Output 40 ml   Net -40 ml       Lines/Drains/Airways     Drain                 Closed/Suction Drain 02/08/22 1715 RUQ Bulb <1 day          Peripheral Intravenous Line                 Peripheral IV - Single Lumen 02/08/22 0946 20 G Left Antecubital 1 day                Physical Exam  Vitals reviewed.   Constitutional:       General: She is not in acute distress.     Appearance: She is not toxic-appearing or diaphoretic.   HENT:      Nose: Nose normal. No congestion.      Mouth/Throat:      Mouth: Mucous membranes are moist.      Pharynx: Oropharynx is clear.   Eyes:      General: No scleral icterus.     Pupils: Pupils are equal, round, and reactive to light.   Cardiovascular:      Rate and Rhythm: Normal rate and regular rhythm.      Pulses: Normal pulses.      Heart sounds: Normal heart sounds. No murmur heard.      Pulmonary:      Effort: Tachypnea present.      Breath sounds: No wheezing, rhonchi or rales.   Abdominal:      General: Bowel sounds are normal.      Palpations: Abdomen is soft.      Tenderness: There is abdominal tenderness.   Musculoskeletal:      Cervical back: Neck supple.      Right lower leg: No edema.      Left lower leg: No edema.   Skin:     General: Skin is warm and dry.      Coloration: Skin is not jaundiced.   Neurological:      Mental Status: She is alert and oriented to person, place, and time.         Significant Labs:   ABG: No results for input(s): PH, PCO2, HCO3, POCSATURATED, BE in the last 48 hours., Blood Culture: No results for input(s): LABBLOO in the last 48 hours., BMP:   Recent Labs   Lab 02/08/22  0912 02/09/22  0502   * 175*    131*   K 3.9 3.9    100   CO2 26 21   BUN  15 17   CREATININE 1.15* 1.51*   CALCIUM 8.6 7.1*   , CMP   Recent Labs   Lab 02/08/22  0912 02/09/22  0502    131*   K 3.9 3.9    100   CO2 26 21   * 175*   BUN 15 17   CREATININE 1.15* 1.51*   CALCIUM 8.6 7.1*   PROT 6.7 5.1*   ALBUMIN 3.0* 2.2*   BILITOT 1.6* 1.0   ALKPHOS 265* 230*   AST 76* 55*   ALT 46 41   ANIONGAP 14 14   EGFRNONAA 51* 37*   , CBC   Recent Labs   Lab 02/08/22  0912 02/08/22  0912 02/09/22  0007 02/09/22  0007 02/09/22  0502   WBC 8.57  --  10.52  --  8.51   HGB 11.6*  --  9.4*  --  9.1*   HCT 34.0*   < > 28.3*   < > 27.0*     --  241  --  201    < > = values in this interval not displayed.   , INR   Recent Labs   Lab 02/08/22  0912 02/09/22  1539   INR 0.94 1.11*   , Lipid Panel No results for input(s): CHOL, HDL, LDLCALC, TRIG, CHOLHDL in the last 48 hours. and Troponin No results for input(s): TROPONINI in the last 48 hours.    Significant Imaging: Cardiac Cath: [facility] 9/17/2021 with clean coronaries, Echocardiogram:   Transthoracic echo (TTE) complete (Cupid Only):   Results for orders placed or performed during the hospital encounter of 02/08/22   Echo   Result Value Ref Range    BSA 1.8 m2    Right Atrial Pressure (from IVC) 15 mmHg    EF 70 %    Left Ventricular Outflow Tract Mean Gradient 1.00 mmHg    AORTIC VALVE CUSP SEPERATION 1.83 cm    LVIDd 3.50 (A) 3.5 - 6.0 cm    IVS 0.94 0.6 - 1.1 cm    Posterior Wall 0.71 0.6 - 1.1 cm    Ao root annulus 2.70 cm    LVIDs 1.66 (A) 2.1 - 4.0 cm    FS 53 28 - 44 %    IVC ostium 1.9 cm    LV mass 78.57 g    LA size 2.90 cm    RVDD 4.50 cm    TAPSE 1.60 cm    Left Ventricle Relative Wall Thickness 0.41 cm    AV mean gradient 3 mmHg    AV valve area 1.46 cm2    AV Velocity Ratio 0.70     AV index (prosthetic) 0.64     E wave deceleration time 154 msec    LVOT diameter 1.70 cm    LVOT area 2.3 cm2    LVOT peak ashish 0.7 m/s    LVOT peak VTI 9.00 cm    Ao peak ashish 1.0 m/s    Ao VTI 14.00 cm    LVOT stroke volume 20.42 cm3    AV  peak gradient 4 mmHg    TV rest pulmonary artery pressure 36 mmHg    MV Peak E Pankaj 0.71 m/s    TR Max Pankaj 2.30 m/s    LV Systolic Volume 16.58 mL    LV Systolic Volume Index 9.5 mL/m2    LV Diastolic Volume 42.85 mL    LV Diastolic Volume Index 24.49 mL/m2    LV Mass Index 45 g/m2    Echo EF Estimated 60 %    RA Major Axis 3.90 cm    Triscuspid Valve Regurgitation Peak Gradient 21 mmHg    Narrative    · Sinus rhythm, rate 95.  · The left ventricle is normal in size with normal systolic function.  · The estimated ejection fraction is 70%.  · Normal left ventricular diastolic function.  · Atrial fibrillation not observed.  · Moderate right ventricular enlargement.  · Moderate right atrial enlargement.  · Mild tricuspid regurgitation.  · Elevated central venous pressure (15 mmHg).  · The estimated PA systolic pressure is 36 mmHg.  · Trivial pericardial effusion.      , EKG: SR with diffuse ST elevations, reviewed by Dr. Camargo and X-Ray: CXR: X-Ray Chest 1 View (CXR):   Results for orders placed or performed during the hospital encounter of 02/08/22   X-Ray Chest 1 View    Narrative    EXAMINATION:  XR CHEST 1 VIEW    CLINICAL HISTORY:  shortness of breath;    TECHNIQUE:  Single frontal view of the chest was performed.    COMPARISON:  06/09/2021    FINDINGS:  There is a patchy density in the right lung base and minimally in the left lung base.  The upper lungs are clear.  No pneumothorax.  No pleural effusion.      Impression    Bibasilar atelectatic changes.      Electronically signed by: Kendall Ray  Date:    02/09/2022  Time:    10:00     Assessment and Plan:     * Calculus of gallbladder without cholecystitis without obstruction  - Being followed by general surgery    Elevated troponin  - Initial troponin 152, continue trending    Abnormal EKG  - Patient seen and evaluated by Dr. Camargo  - EKG SR with diffuse ST elevation without reciprocal changes  - [facility] 5 months ago 9/17/2021 with clean coronaries  - Stat  echo performed which revealed normal systolic function, EF 70%, no wall motion abnormality, moderate right atrial and right ventricular enlargement.  - Troponin 152, will continue trending, BNP 6200, O2 sat 87-90% on 3L NC when patient is talking  - Findings above are very suspicious for PE and not ACS, start IV heparin PE protocol and ordered stat CT PE  - Pending CT results, will consider pulmonary thrombectomy if needed  - Strict bedrest for now, telemetry monitoring and O2 monitoring    Hypoxemia requiring supplemental oxygen  - Currently on 3L NC with O2sat 87-90%  - Continue monitoring        VTE Risk Mitigation (From admission, onward)         Ordered     heparin 25,000 units in dextrose 5% 250 mL (100 units/mL) infusion HIGH INTENSITY nomogram - [facility]  Continuous        Question:  Begin at (in units/kg/hr)  Answer:  18    02/09/22 1521     heparin 25,000 units in dextrose 5% (100 units/ml) IV bolus from bag - ADDITIONAL PRN BOLUS - 60 units/kg  As needed (PRN)        Question:  Heparin Infusion Adjustment (DO NOT MODIFY ANSWER)  Answer:  \\[email or web address]\epic\Images\Pharmacy\HeparinInfusions\heparin HIGH INTENSITY nomogram for [facility] [id number].pdf    02/09/22 1521     heparin 25,000 units in dextrose 5% (100 units/ml) IV bolus from bag - ADDITIONAL PRN BOLUS - 30 units/kg  As needed (PRN)        Question:  Heparin Infusion Adjustment (DO NOT MODIFY ANSWER)  Answer:  \[email or web address]\epic\Images\Pharmacy\HeparinInfusions\heparin HIGH INTENSITY nomogram for [facility] [id number].pdf    02/09/22 1521     IP VTE LOW RISK PATIENT  Once         02/08/22 1009     Place sequential compression device  Until discontinued         02/08/22 1009                Thank you for your consult. I will follow-up with patient. Please contact us if you have any additional questions.    Sandi Chen, FNP  Cardiology   [facility] - Orthopedic

## 2022-02-09 NOTE — PROGRESS NOTES
[facility] - Orthopedic  General Surgery  Progress Note    Subjective: hallucinating last night, hypotension overnight, improved, some shortness of breath with productive cough     History of Present Illness:  62-year-old female 62-year-old female with symptoms of biliaryhas been having symptoms  of biliary colic no wall thickening  no white count PCP Dr. Benavides was setting up to see surgeon outpatient however she woke up with severe epigastric, RUQ pain nausea this am and presented to [facility] ER General surgery was consulted, she does have mile elevated T Bilirubin 1.6 with AST 76, other LFTS normal possibly elevated from dehydration      Post-Op Info:  Procedure(s) (LRB):  CHOLECYSTECTOMY, LAPAROSCOPIC, WITH CHOLANGIOGRAM (N/A)   1 Day Post-Op     No current facility-administered medications on file prior to encounter.     Current Outpatient Medications on File Prior to Encounter   Medication Sig    albuterol (PROVENTIL/VENTOLIN HFA) 90 mcg/actuation inhaler Inhale 2 puffs into the lungs every 6 (six) hours as needed for Wheezing. Rescue    budesonide-glycopyr-formoterol (BREZTRI AEROSPHERE) 160-9-4.8 mcg/actuation HFAA Inhale 2 puffs into the lungs once daily. (Patient not taking: Reported on 2/7/2022)    celecoxib (CELEBREX) 200 MG capsule Take 1 capsule (200 mg total) by mouth once daily.    cetirizine (ZYRTEC) 10 MG tablet Take 10 mg by mouth once daily.    ciprofloxacin HCl (CIPRO) 500 MG tablet Take 1 tablet (500 mg total) by mouth every 12 (twelve) hours.    EScitalopram oxalate (LEXAPRO) 10 MG tablet Take 1 tablet (10 mg total) by mouth once daily.    fluticasone propionate (FLONASE) 50 mcg/actuation nasal spray 1 spray by Each Nostril route 2 (two) times a day.    hydroCHLOROthiazide (HYDRODIURIL) 12.5 MG Tab Take 12.5 mg by mouth once daily.    HYDROcodone-acetaminophen (NORCO) 7.5-325 mg per tablet Take 1 tablet by mouth every 6 (six) hours as needed for Pain.    hydrOXYzine pamoate  (VISTARIL) 25 MG Cap Take 1 capsule (25 mg total) by mouth 2 (two) times daily as needed (for anxiety).    iron fum-B12-IF-C-folic acid (FOLTRIN) 110-0.5 mg capsule Take 1 capsule by mouth 2 (two) times daily.    omeprazole (PRILOSEC) 20 MG capsule Take 1 capsule (20 mg total) by mouth 2 (two) times a day.    ondansetron (ZOFRAN-ODT) 4 MG TbDL Take 1 tablet (4 mg total) by mouth 2 (two) times daily.    pantoprazole (PROTONIX) 40 MG tablet Take 1 tablet (40 mg total) by mouth once daily.    sumatriptan (IMITREX) 100 MG tablet Take 1 tablet (100 mg total) by mouth every 2 (two) hours as needed for Migraine (do not take over 200 mg in 24 hrs). 1 tablet by mouth at onset of headache,may repeat once in 24 hours if no relief.    tiZANidine (ZANAFLEX) 4 MG tablet Take 1 tablet (4 mg total) by mouth nightly.       Review of patient's allergies indicates:  No Known Allergies    Past Medical History:   Diagnosis Date    Allergic rhinitis, mild     Anemia     Anxiety     Degenerative disc disease, cervical     Depression     GERD (gastroesophageal reflux disease)     Hyperlipidemia     Hypertension     Insomnia     Migraine     Vitamin D deficiency      Past Surgical History:   Procedure Laterality Date    ANGIOGRAM, CORONARY, WITH LEFT HEART CATHETERIZATION Left 2021    Procedure: Left heart cath w/ coronary angiograms;  Surgeon: Demi Carias MD;  Location: [facility] CATH LAB;  Service: Cardiology;  Laterality: Left;    INGUINAL HERNIA REPAIR Right 1968     Family History     Problem Relation (Age of Onset)    Arthritis Other    Cancer Father    Diabetes Other    Hypertension Father        Tobacco Use    Smoking status: Former Smoker     Types: Cigarettes     Quit date: 10/2009     Years since quittin.3    Smokeless tobacco: Never Used   Substance and Sexual Activity    Alcohol use: Never    Drug use: Never    Sexual activity: Yes     Partners: Male     Birth control/protection:  Post-menopausal     Review of Systems  Objective:     Vital Signs (Most Recent):  Temp: 98.8 °F (37.1 °C) (02/08/22 0834)  Pulse: 97 (02/08/22 0834)  Resp: 16 (02/08/22 0947)  BP: 109/77 (02/08/22 0834)  SpO2: 100 % (02/08/22 0834) Vital Signs (24h Range):  Temp:  [98.8 °F (37.1 °C)] 98.8 °F (37.1 °C)  Pulse:  [80-97] 97  Resp:  [16-20] 16  SpO2:  [96 %-100 %] 100 %  BP: (109-160)/() 109/77     Weight: 73.9 kg (163 lb)  Body mass index is 29.81 kg/m².    Physical Exam  Vitals and nursing note reviewed.   HENT:      Head: Normocephalic.   Eyes:      Conjunctiva/sclera: Conjunctivae normal.   Cardiovascular:      Pulses: Normal pulses.   Pulmonary:      Effort: Pulmonary effort is normal.      Breath sounds: Normal breath sounds.   Abdominal:      General: Abdomen is flat.      Palpations: Abdomen is soft.      Tenderness: There is no abdominal tenderness.      Comments: Appropriate tenderness at incisions   afua serous   Musculoskeletal:         General: Normal range of motion.   Skin:     General: Skin is warm and dry.      Capillary Refill: Capillary refill takes less than 2 seconds.   Neurological:      General: No focal deficit present.      Mental Status: She is alert and oriented to person, place, and time.   Psychiatric:         Mood and Affect: Mood normal.         Significant Labs:  I have reviewed all pertinent lab results within the past 24 hours.  CBC:   Recent Labs   Lab 02/09/22  0502   WBC 8.51   RBC 3.24*   HGB 9.1*   HCT 27.0*      MCV 83.3   MCH 28.1   MCHC 33.7     BMP:   Recent Labs   Lab 02/09/22  0502   *   *   K 3.9      CO2 21   BUN 17   CREATININE 1.51*   CALCIUM 7.1*       Significant Diagnostics:  I have reviewed all pertinent imaging results/findings within the past 24 hours.    Assessment/Plan:  Cxr, nebs I/S doppler bnp, troponin repeat LFTs am dc muscle relaxer avoid morphine try norco with hypotension, consult hospitalist     * Calculus of gallbladder  without cholecystitis without obstruction  02/08/2022 acute cholecystitis with cholelithiasis, iv abx zosyn IV hydration  lap casie per dr lira, instructed on risks benefits possible require open possible need drain or   Additional procedures, possible cholangiogram pain control she agrees to proceed with surgery    02/09/2022 trend LFTs afua serous         Shakira Baker, ACNP  General Surgery  [facility] - Orthopedic

## 2022-02-09 NOTE — ASSESSMENT & PLAN NOTE
02/08/2022 acute cholecystitis with cholelithiasis, iv abx zosyn IV hydration  lap casie per dr lira, instructed on risks benefits possible require open possible need drain or   Additional procedures, possible cholangiogram pain control she agrees to proceed with surgery    02/09/2022 trend LFTs afua serous

## 2022-02-09 NOTE — ANESTHESIA POSTPROCEDURE EVALUATION
Anesthesia Post Evaluation    Patient: Radha Crockett    Procedure(s) Performed: Procedure(s) (LRB):  CHOLECYSTECTOMY, LAPAROSCOPIC, WITH CHOLANGIOGRAM (N/A)    Final Anesthesia Type: general      Patient location during evaluation: PACU  Patient participation: Yes- Able to Participate  Level of consciousness: awake and sedated  Post-procedure vital signs: reviewed and stable  Pain management: adequate  Airway patency: patent    PONV status at discharge: No PONV  Anesthetic complications: no      Cardiovascular status: blood pressure returned to baseline  Respiratory status: unassisted  Hydration status: euvolemic  Follow-up not needed.          Vitals Value Taken Time   /74 02/08/22 1820   Temp 37.4 °C (99.4 °F) 02/08/22 1758   Pulse 96 02/08/22 1821   Resp 20 02/08/22 1839   SpO2 93 % 02/08/22 1821   Vitals shown include unvalidated device data.      Event Time   Out of Recovery 18:20:00         Pain/Lillian Score: Pain Rating Prior to Med Admin: 7 (2/8/2022  6:39 PM)  Lillian Score: 9 (2/8/2022  6:20 PM)

## 2022-02-09 NOTE — HPI
61 y/o female with PMH of HTN, HLD, GERD, anemia, depression and anxiety who presented to ED yesterday with severe right upper quadrant pain and nausea. She had history of gallstones and was set up to see a surgeon outpatient but developed severe pain and nausea the morning of 2/8/2022, presented to ED and underwent laparoscopic cholecystectomy that afternoon. Today cardiology consulted for elevated troponin and abnormal EKG.    Patient reports she became very short of breath overnight with coughing episodes, she has been experiencing midsternal chest discomfort and left upper chest/shoulder pain since surgery yesterday (believes this may be related to air from laparoscopic procedure). BP presently 102/64 and O2 sat 87-90% on 3L NC. EKG shows diffuse ST elevation. Troponin 152 and BNP 6,200. Echo with normal systolic function, EF 70% with moderate right ventricular and right atrial enlargement. LHC performed 5 months ago in 9/2021 with clean coronary arteries.

## 2022-02-10 LAB
ALBUMIN SERPL BCP-MCNC: 2.2 G/DL (ref 3.5–5)
ALBUMIN/GLOB SERPL: 0.7 {RATIO}
ALP SERPL-CCNC: 214 U/L (ref 50–130)
ALT SERPL W P-5'-P-CCNC: 27 U/L (ref 13–56)
ANION GAP SERPL CALCULATED.3IONS-SCNC: 15 MMOL/L (ref 7–16)
AST SERPL W P-5'-P-CCNC: 27 U/L (ref 15–37)
BASOPHILS # BLD AUTO: 0.02 K/UL (ref 0–0.2)
BASOPHILS NFR BLD AUTO: 0.3 % (ref 0–1)
BILIRUB SERPL-MCNC: 0.8 MG/DL (ref 0–1.2)
BUN SERPL-MCNC: 11 MG/DL (ref 7–18)
BUN/CREAT SERPL: 10 (ref 6–20)
CALCIUM SERPL-MCNC: 7.6 MG/DL (ref 8.5–10.1)
CHLORIDE SERPL-SCNC: 103 MMOL/L (ref 98–107)
CO2 SERPL-SCNC: 25 MMOL/L (ref 21–32)
CREAT SERPL-MCNC: 1.09 MG/DL (ref 0.55–1.02)
DIFFERENTIAL METHOD BLD: ABNORMAL
EOSINOPHIL # BLD AUTO: 0.04 K/UL (ref 0–0.5)
EOSINOPHIL NFR BLD AUTO: 0.5 % (ref 1–4)
ERYTHROCYTE [DISTWIDTH] IN BLOOD BY AUTOMATED COUNT: 13.8 % (ref 11.5–14.5)
ESTROGEN SERPL-MCNC: NORMAL PG/ML
GLOBULIN SER-MCNC: 3.1 G/DL (ref 2–4)
GLUCOSE SERPL-MCNC: 123 MG/DL (ref 74–106)
HCT VFR BLD AUTO: 26.1 % (ref 38–47)
HGB BLD-MCNC: 9 G/DL (ref 12–16)
IMM GRANULOCYTES # BLD AUTO: 0.02 K/UL (ref 0–0.04)
IMM GRANULOCYTES NFR BLD: 0.3 % (ref 0–0.4)
INSULIN SERPL-ACNC: NORMAL U[IU]/ML
LAB AP GROSS DESCRIPTION: NORMAL
LAB AP LABORATORY NOTES: NORMAL
LYMPHOCYTES # BLD AUTO: 0.78 K/UL (ref 1–4.8)
LYMPHOCYTES NFR BLD AUTO: 10.1 % (ref 27–41)
MCH RBC QN AUTO: 28.4 PG (ref 27–31)
MCHC RBC AUTO-ENTMCNC: 34.5 G/DL (ref 32–36)
MCV RBC AUTO: 82.3 FL (ref 80–96)
MONOCYTES # BLD AUTO: 0.63 K/UL (ref 0–0.8)
MONOCYTES NFR BLD AUTO: 8.2 % (ref 2–6)
MPC BLD CALC-MCNC: 9.5 FL (ref 9.4–12.4)
NEUTROPHILS # BLD AUTO: 6.2 K/UL (ref 1.8–7.7)
NEUTROPHILS NFR BLD AUTO: 80.6 % (ref 53–65)
NRBC # BLD AUTO: 0.03 X10E3/UL
NRBC, AUTO (.00): 0.4 %
PLATELET # BLD AUTO: 236 K/UL (ref 150–400)
POTASSIUM SERPL-SCNC: 3.6 MMOL/L (ref 3.5–5.1)
PROT SERPL-MCNC: 5.3 G/DL (ref 6.4–8.2)
RBC # BLD AUTO: 3.17 M/UL (ref 4.2–5.4)
SODIUM SERPL-SCNC: 139 MMOL/L (ref 136–145)
T3RU NFR SERPL: NORMAL %
TROPONIN I SERPL HS-MCNC: 224 PG/ML
WBC # BLD AUTO: 7.69 K/UL (ref 4.5–11)

## 2022-02-10 PROCEDURE — 80053 COMPREHEN METABOLIC PANEL: CPT | Performed by: NURSE PRACTITIONER

## 2022-02-10 PROCEDURE — 25000003 PHARM REV CODE 250: Performed by: STUDENT IN AN ORGANIZED HEALTH CARE EDUCATION/TRAINING PROGRAM

## 2022-02-10 PROCEDURE — 93454 PR CATH PLACE/CORONARY ANGIO, IMG SUPER/INTERP: ICD-10-PCS | Mod: 26,,, | Performed by: STUDENT IN AN ORGANIZED HEALTH CARE EDUCATION/TRAINING PROGRAM

## 2022-02-10 PROCEDURE — C1894 INTRO/SHEATH, NON-LASER: HCPCS | Performed by: STUDENT IN AN ORGANIZED HEALTH CARE EDUCATION/TRAINING PROGRAM

## 2022-02-10 PROCEDURE — 93454 CORONARY ARTERY ANGIO S&I: CPT | Performed by: STUDENT IN AN ORGANIZED HEALTH CARE EDUCATION/TRAINING PROGRAM

## 2022-02-10 PROCEDURE — 99219 PR INITIAL OBSERVATION CARE,LEVL II: ICD-10-PCS | Mod: ,,, | Performed by: HOSPITALIST

## 2022-02-10 PROCEDURE — 27100168 OPTIME MED/SURG SUP & DEVICES NON-STERILE SUPPLY: Performed by: STUDENT IN AN ORGANIZED HEALTH CARE EDUCATION/TRAINING PROGRAM

## 2022-02-10 PROCEDURE — 63600175 PHARM REV CODE 636 W HCPCS: Performed by: SURGERY

## 2022-02-10 PROCEDURE — 36415 COLL VENOUS BLD VENIPUNCTURE: CPT | Performed by: NURSE PRACTITIONER

## 2022-02-10 PROCEDURE — 96361 HYDRATE IV INFUSION ADD-ON: CPT

## 2022-02-10 PROCEDURE — S5010 5% DEXTROSE AND 0.45% SALINE: HCPCS | Performed by: NURSE PRACTITIONER

## 2022-02-10 PROCEDURE — 99152 MOD SED SAME PHYS/QHP 5/>YRS: CPT | Performed by: STUDENT IN AN ORGANIZED HEALTH CARE EDUCATION/TRAINING PROGRAM

## 2022-02-10 PROCEDURE — 27201423 OPTIME MED/SURG SUP & DEVICES STERILE SUPPLY: Performed by: STUDENT IN AN ORGANIZED HEALTH CARE EDUCATION/TRAINING PROGRAM

## 2022-02-10 PROCEDURE — 84484 ASSAY OF TROPONIN QUANT: CPT | Performed by: NURSE PRACTITIONER

## 2022-02-10 PROCEDURE — 93454 CORONARY ARTERY ANGIO S&I: CPT | Mod: 26,,, | Performed by: STUDENT IN AN ORGANIZED HEALTH CARE EDUCATION/TRAINING PROGRAM

## 2022-02-10 PROCEDURE — 25000003 PHARM REV CODE 250: Performed by: SURGERY

## 2022-02-10 PROCEDURE — 96366 THER/PROPH/DIAG IV INF ADDON: CPT | Mod: 59

## 2022-02-10 PROCEDURE — 25500020 PHARM REV CODE 255: Performed by: STUDENT IN AN ORGANIZED HEALTH CARE EDUCATION/TRAINING PROGRAM

## 2022-02-10 PROCEDURE — 27800903 OPTIME MED/SURG SUP & DEVICES OTHER IMPLANTS: Performed by: STUDENT IN AN ORGANIZED HEALTH CARE EDUCATION/TRAINING PROGRAM

## 2022-02-10 PROCEDURE — 27000080 OPTIME MED/SURG SUP & DEVICES GENERAL CLASSIFICATION: Performed by: STUDENT IN AN ORGANIZED HEALTH CARE EDUCATION/TRAINING PROGRAM

## 2022-02-10 PROCEDURE — C1887 CATHETER, GUIDING: HCPCS | Performed by: STUDENT IN AN ORGANIZED HEALTH CARE EDUCATION/TRAINING PROGRAM

## 2022-02-10 PROCEDURE — G0378 HOSPITAL OBSERVATION PER HR: HCPCS

## 2022-02-10 PROCEDURE — 25000003 PHARM REV CODE 250: Performed by: NURSE PRACTITIONER

## 2022-02-10 PROCEDURE — C1769 GUIDE WIRE: HCPCS | Performed by: STUDENT IN AN ORGANIZED HEALTH CARE EDUCATION/TRAINING PROGRAM

## 2022-02-10 PROCEDURE — 85025 COMPLETE CBC W/AUTO DIFF WBC: CPT | Performed by: NURSE PRACTITIONER

## 2022-02-10 PROCEDURE — 99219 PR INITIAL OBSERVATION CARE,LEVL II: CPT | Mod: ,,, | Performed by: HOSPITALIST

## 2022-02-10 PROCEDURE — 63600175 PHARM REV CODE 636 W HCPCS: Performed by: STUDENT IN AN ORGANIZED HEALTH CARE EDUCATION/TRAINING PROGRAM

## 2022-02-10 PROCEDURE — 96375 TX/PRO/DX INJ NEW DRUG ADDON: CPT | Mod: 59

## 2022-02-10 RX ORDER — ACETAMINOPHEN 325 MG/1
650 TABLET ORAL EVERY 4 HOURS PRN
Status: DISCONTINUED | OUTPATIENT
Start: 2022-02-10 | End: 2022-02-11 | Stop reason: HOSPADM

## 2022-02-10 RX ORDER — HEPARIN SOD,PORCINE/0.9 % NACL 1000/500ML
INTRAVENOUS SOLUTION INTRAVENOUS
Status: DISCONTINUED | OUTPATIENT
Start: 2022-02-10 | End: 2022-02-10 | Stop reason: HOSPADM

## 2022-02-10 RX ORDER — LIDOCAINE HYDROCHLORIDE 10 MG/ML
INJECTION, SOLUTION EPIDURAL; INFILTRATION; INTRACAUDAL; PERINEURAL
Status: DISCONTINUED | OUTPATIENT
Start: 2022-02-10 | End: 2022-02-10 | Stop reason: HOSPADM

## 2022-02-10 RX ORDER — ONDANSETRON 4 MG/1
8 TABLET, ORALLY DISINTEGRATING ORAL EVERY 8 HOURS PRN
Status: DISCONTINUED | OUTPATIENT
Start: 2022-02-10 | End: 2022-02-11 | Stop reason: HOSPADM

## 2022-02-10 RX ORDER — SODIUM CHLORIDE 450 MG/100ML
INJECTION, SOLUTION INTRAVENOUS
Status: DISCONTINUED | OUTPATIENT
Start: 2022-02-10 | End: 2022-02-11 | Stop reason: HOSPADM

## 2022-02-10 RX ORDER — FENTANYL CITRATE 50 UG/ML
INJECTION, SOLUTION INTRAMUSCULAR; INTRAVENOUS
Status: DISCONTINUED | OUTPATIENT
Start: 2022-02-10 | End: 2022-02-10 | Stop reason: HOSPADM

## 2022-02-10 RX ORDER — MIDAZOLAM HYDROCHLORIDE 1 MG/ML
INJECTION INTRAMUSCULAR; INTRAVENOUS
Status: DISCONTINUED | OUTPATIENT
Start: 2022-02-10 | End: 2022-02-10 | Stop reason: HOSPADM

## 2022-02-10 RX ADMIN — CETIRIZINE HYDROCHLORIDE 10 MG: 10 TABLET, FILM COATED ORAL at 09:02

## 2022-02-10 RX ADMIN — PIPERACILLIN SODIUM AND TAZOBACTAM SODIUM 4.5 G: 4; .5 INJECTION, POWDER, LYOPHILIZED, FOR SOLUTION INTRAVENOUS at 03:02

## 2022-02-10 RX ADMIN — FLUTICASONE PROPIONATE 50 MCG: 50 SPRAY, METERED NASAL at 08:02

## 2022-02-10 RX ADMIN — ESCITALOPRAM OXALATE 10 MG: 10 TABLET ORAL at 09:02

## 2022-02-10 RX ADMIN — HYDROCHLOROTHIAZIDE 12.5 MG: 12.5 TABLET ORAL at 09:02

## 2022-02-10 RX ADMIN — PIPERACILLIN SODIUM AND TAZOBACTAM SODIUM 4.5 G: 4; .5 INJECTION, POWDER, LYOPHILIZED, FOR SOLUTION INTRAVENOUS at 06:02

## 2022-02-10 RX ADMIN — Medication 1 CAPSULE: at 08:02

## 2022-02-10 RX ADMIN — FLUTICASONE PROPIONATE 50 MCG: 50 SPRAY, METERED NASAL at 09:02

## 2022-02-10 RX ADMIN — PANTOPRAZOLE SODIUM 40 MG: 40 TABLET, DELAYED RELEASE ORAL at 09:02

## 2022-02-10 RX ADMIN — DEXTROSE AND SODIUM CHLORIDE: 5; 450 INJECTION, SOLUTION INTRAVENOUS at 01:02

## 2022-02-10 RX ADMIN — Medication 1 CAPSULE: at 09:02

## 2022-02-10 RX ADMIN — ACETAMINOPHEN 650 MG: 325 TABLET ORAL at 08:02

## 2022-02-10 RX ADMIN — KETOROLAC TROMETHAMINE 15 MG: 30 INJECTION, SOLUTION INTRAMUSCULAR at 05:02

## 2022-02-10 RX ADMIN — PIPERACILLIN SODIUM AND TAZOBACTAM SODIUM 4.5 G: 4; .5 INJECTION, POWDER, LYOPHILIZED, FOR SOLUTION INTRAVENOUS at 11:02

## 2022-02-10 NOTE — NURSING
Notified Dr Jara of increased Troponin of 253 no new orders were given at the time will continue to monitor and provide safe envoronment

## 2022-02-10 NOTE — PROGRESS NOTES
[facility] - Orthopedic  General Surgery  Progress Note    Subjective: feels better     History of Present Illness:  62-year-old female 62-year-old female with symptoms of biliaryhas been having symptoms  of biliary colic no wall thickening  no white count PCP Dr. Benavides was setting up to see surgeon outpatient however she woke up with severe epigastric, RUQ pain nausea this am and presented to [facility] ER General surgery was consulted, she does have mile elevated T Bilirubin 1.6 with AST 76, other LFTS normal possibly elevated from dehydration      Post-Op Info:  Procedure(s) (LRB):  Left heart cath (Left)   Day of Surgery     Interval History: shortness of breath  immproved today  elevated BNP abnormal ecg, elevated troponin trending down  CT negative for PE, negative dopplers possible LLL pneumonia, cards plans for [facility]    Medications:  Continuous Infusions:   sodium chloride 0.45% Stopped (02/10/22 1202)    dextrose 5 % and 0.45 % NaCl 50 mL/hr at 02/10/22 0700    lactated ringers 125 mL/hr at 02/09/22 0339    lactated ringers       Scheduled Meds:   cetirizine  10 mg Oral Daily    EScitalopram oxalate  10 mg Oral Daily    fluticasone propionate  1 spray Each Nostril BID    hydroCHLOROthiazide  12.5 mg Oral Daily    iron fum-B12-IF-C-folic acid  1 capsule Oral BID    LIDOcaine (PF) 10 mg/ml (1%)  1 mL Other Once    pantoprazole  40 mg Oral Daily    piperacillin-tazobactam (ZOSYN) IVPB  4.5 g Intravenous Q8H     PRN Meds:sodium chloride 0.45%, acetaminophen, acetaminophen, albuterol, albuterol sulfate, albuterol-ipratropium, cloNIDine, HYDROcodone-acetaminophen, hydrOXYzine pamoate, ketorolac, metoclopramide HCl, morphine, morphine, ondansetron     Review of patient's allergies indicates:  No Known Allergies  Objective:     Vital Signs (Most Recent):  Temp: 98.2 °F (36.8 °C) (02/10/22 1230)  Pulse: 99 (02/10/22 1230)  Resp: 16 (02/10/22 1230)  BP: (!) 140/80 (02/10/22 1230)  SpO2: (!) 94 % (02/10/22  1230) Vital Signs (24h Range):  Temp:  [97.8 °F (36.6 °C)-100.3 °F (37.9 °C)] 98.2 °F (36.8 °C)  Pulse:  [] 99  Resp:  [16-20] 16  SpO2:  [91 %-95 %] 94 %  BP: (104-140)/(72-80) 140/80     Weight: 73.9 kg (163 lb)  Body mass index is 29.81 kg/m².    Intake/Output - Last 3 Shifts       02/08 0700  02/09 0659 02/09 0700  02/10 0659 02/10 0700 02/11 0659    I.V. (mL/kg)  300 (4.1) 350 (4.7)    Total Intake(mL/kg)  300 (4.1) 350 (4.7)    Urine (mL/kg/hr)  800 (0.5) 700 (1.7)    Drains 40      Total Output 40 800 700    Net -40 -500 -350                 Physical Exam  Vitals and nursing note reviewed.   HENT:      Head: Normocephalic.      Mouth/Throat:      Mouth: Mucous membranes are moist.   Abdominal:      General: Abdomen is flat.      Palpations: Abdomen is soft.      Comments: afua serous  dsg c/d/i  Appropriate incisional tenderness  Pre op ruq tenderness resolved   Musculoskeletal:         General: Normal range of motion.   Skin:     General: Skin is warm and dry.      Capillary Refill: Capillary refill takes less than 2 seconds.   Neurological:      Mental Status: She is alert.   Psychiatric:         Mood and Affect: Mood normal.         Significant Labs:  I have reviewed all pertinent lab results within the past 24 hours.  CBC:   Recent Labs   Lab 02/10/22  0449   WBC 7.69   RBC 3.17*   HGB 9.0*   HCT 26.1*      MCV 82.3   MCH 28.4   MCHC 34.5     BMP:   Recent Labs   Lab 02/10/22  0449   *      K 3.6      CO2 25   BUN 11   CREATININE 1.09*   CALCIUM 7.6*       Significant Diagnostics:  I have reviewed all pertinent imaging results/findings within the past 24 hours.    Assessment/Plan:     * Calculus of gallbladder without cholecystitis without obstruction  02/08/2022 acute cholecystitis with cholelithiasis, iv abx zosyn IV hydration  lap casie per dr lira, instructed on risks benefits possible require open possible need drain or   Additional procedures, possible cholangiogram  pain control she agrees to proceed with surgery    02/09/2022 trend LFTs afua serous     02/10/2022 LFTs improving, repeat LFT am  afua gallego, LHC today per cardiology,         Shakira Baker, ACNP  General Surgery  [facility] - Orthopedic

## 2022-02-10 NOTE — PROGRESS NOTES
[facility] [place] - Cath Lab  Cardiology  Progress Note    Patient Name: Radha Crockett  MRN: [id number]  Admission Date: 2/8/2022  Hospital Length of Stay: 0 days  Code Status: Full Code   Attending Physician: Steve Florez MD   Primary Care Physician: Eric Benavides DO  Expected Discharge Date:   Principal Problem:Calculus of gallbladder without cholecystitis without obstruction    Subjective:     Hospital Course:   No notes on file    Interval History: Patient seen today, reports sob has improved, continues to have right shoulder discomfort (s/p laparoscopic cholecystectomy 2/8/2022). CT PE was negative for PE, Troponin peaked at 603 and trended down to 224. Currently on IV heparin.    Review of Systems   Constitutional: Negative for chills and fever.   Cardiovascular: Positive for chest pain. Negative for leg swelling, orthopnea and palpitations.   Respiratory: Positive for cough, shortness of breath and sleep disturbances due to breathing.         SOB improving   Gastrointestinal: Positive for abdominal pain.        S/P laparoscopic cholecystectomy 2/8/2022.   All other systems reviewed and are negative.    Objective:     Vital Signs (Most Recent):  Temp: 98.6 °F (37 °C) (02/10/22 0720)  Pulse: 93 (02/10/22 0720)  Resp: 18 (02/10/22 0720)  BP: 118/79 (02/10/22 0720)  SpO2: (!) 92 % (02/10/22 0720) Vital Signs (24h Range):  Temp:  [97.8 °F (36.6 °C)-100.3 °F (37.9 °C)] 98.6 °F (37 °C)  Pulse:  [] 93  Resp:  [18-20] 18  SpO2:  [90 %-95 %] 92 %  BP: (104-119)/(72-79) 118/79     Weight: 73.9 kg (163 lb)  Body mass index is 29.81 kg/m².     SpO2: (!) 92 %  O2 Device (Oxygen Therapy): nasal cannula      Intake/Output Summary (Last 24 hours) at 2/10/2022 1042  Last data filed at 2/10/2022 0700  Gross per 24 hour   Intake 650 ml   Output 1500 ml   Net -850 ml       Lines/Drains/Airways     Drain                 Closed/Suction Drain 02/08/22 1715 RUQ Bulb 1 day          Peripheral Intravenous Line                  Peripheral IV - Single Lumen 02/08/22 0946 20 G Left Antecubital 2 days         Peripheral IV - Single Lumen 02/09/22 1755 22 G Posterior;Right Hand <1 day                Physical Exam  Vitals reviewed.   Constitutional:       General: She is not in acute distress.     Appearance: She is not toxic-appearing or diaphoretic.   Cardiovascular:      Rate and Rhythm: Normal rate and regular rhythm.      Pulses: Normal pulses.      Heart sounds: Normal heart sounds. No murmur heard.      Pulmonary:      Effort: Tachypnea present.      Breath sounds: No wheezing, rhonchi or rales.   Abdominal:      General: Bowel sounds are normal.      Palpations: Abdomen is soft.      Tenderness: There is abdominal tenderness.   Musculoskeletal:      Cervical back: Neck supple.   Neurological:      Mental Status: She is alert and oriented to person, place, and time.         Significant Labs:   ABG: No results for input(s): PH, PCO2, HCO3, POCSATURATED, BE in the last 48 hours., Blood Culture: No results for input(s): LABBLOO in the last 48 hours., BMP:   Recent Labs   Lab 02/09/22  0502 02/10/22  0449   * 123*   * 139   K 3.9 3.6    103   CO2 21 25   BUN 17 11   CREATININE 1.51* 1.09*   CALCIUM 7.1* 7.6*   , CMP   Recent Labs   Lab 02/09/22  0502 02/10/22  0449   * 139   K 3.9 3.6    103   CO2 21 25   * 123*   BUN 17 11   CREATININE 1.51* 1.09*   CALCIUM 7.1* 7.6*   PROT 5.1* 5.3*   ALBUMIN 2.2* 2.2*   BILITOT 1.0 0.8   ALKPHOS 230* 214*   AST 55* 27   ALT 41 27   ANIONGAP 14 15   EGFRNONAA 37* 54*   , CBC   Recent Labs   Lab 02/09/22  0007 02/09/22  0007 02/09/22  0502 02/09/22  0502 02/10/22  0449   WBC 10.52  --  8.51  --  7.69   HGB 9.4*  --  9.1*  --  9.0*   HCT 28.3*   < > 27.0*   < > 26.1*     --  201  --  236    < > = values in this interval not displayed.   , INR   Recent Labs   Lab 02/09/22  1539   INR 1.11*   , Lipid Panel No results for input(s): CHOL, HDL, LDLCALC, TRIG, CHOLHDL  in the last 48 hours. and Troponin No results for input(s): TROPONINI in the last 48 hours.    Significant Imaging: Cardiac Cath: [facility] 9/2021 with clean coronaries, Echocardiogram:   Transthoracic echo (TTE) complete (Cupid Only):   Results for orders placed or performed during the hospital encounter of 02/08/22   Echo   Result Value Ref Range    BSA 1.8 m2    Right Atrial Pressure (from IVC) 15 mmHg    EF 70 %    Left Ventricular Outflow Tract Mean Gradient 1.00 mmHg    AORTIC VALVE CUSP SEPERATION 1.83 cm    LVIDd 3.50 (A) 3.5 - 6.0 cm    IVS 0.94 0.6 - 1.1 cm    Posterior Wall 0.71 0.6 - 1.1 cm    Ao root annulus 2.70 cm    LVIDs 1.66 (A) 2.1 - 4.0 cm    FS 53 28 - 44 %    IVC ostium 1.9 cm    LV mass 78.57 g    LA size 2.90 cm    RVDD 4.50 cm    TAPSE 1.60 cm    Left Ventricle Relative Wall Thickness 0.41 cm    AV mean gradient 3 mmHg    AV valve area 1.46 cm2    AV Velocity Ratio 0.70     AV index (prosthetic) 0.64     E wave deceleration time 154 msec    LVOT diameter 1.70 cm    LVOT area 2.3 cm2    LVOT peak pankaj 0.7 m/s    LVOT peak VTI 9.00 cm    Ao peak pankaj 1.0 m/s    Ao VTI 14.00 cm    LVOT stroke volume 20.42 cm3    AV peak gradient 4 mmHg    TV rest pulmonary artery pressure 36 mmHg    MV Peak E Pankaj 0.71 m/s    TR Max Pankaj 2.30 m/s    LV Systolic Volume 16.58 mL    LV Systolic Volume Index 9.5 mL/m2    LV Diastolic Volume 42.85 mL    LV Diastolic Volume Index 24.49 mL/m2    LV Mass Index 45 g/m2    Echo EF Estimated 60 %    RA Major Axis 3.90 cm    Triscuspid Valve Regurgitation Peak Gradient 21 mmHg    Narrative    · Sinus rhythm, rate 95.  · The left ventricle is normal in size with normal systolic function.  · The estimated ejection fraction is 70%.  · Normal left ventricular diastolic function.  · Atrial fibrillation not observed.  · Moderate right ventricular enlargement.  · Moderate right atrial enlargement.  · Mild tricuspid regurgitation.  · Elevated central venous pressure (15 mmHg).  · The  estimated PA systolic pressure is 36 mmHg.  · Trivial pericardial effusion.      , EKG: reviewed and X-Ray: CXR: X-Ray Chest 1 View (CXR):   Results for orders placed or performed during the hospital encounter of 02/08/22   X-Ray Chest 1 View    Narrative    EXAMINATION:  XR CHEST 1 VIEW    CLINICAL HISTORY:  shortness of breath;    TECHNIQUE:  Single frontal view of the chest was performed.    COMPARISON:  06/09/2021    FINDINGS:  There is a patchy density in the right lung base and minimally in the left lung base.  The upper lungs are clear.  No pneumothorax.  No pleural effusion.      Impression    Bibasilar atelectatic changes.      Electronically signed by: Kendall Ray  Date:    02/09/2022  Time:    10:00     Assessment and Plan:     Brief HPI: 61 y/o female with PMH of HTN, HLD, GERD, anemia, depression and anxiety who presented to ED yesterday with severe right upper quadrant pain and nausea. She had history of gallstones and was set up to see a surgeon outpatient but developed severe pain and nausea the morning of 2/8/2022, presented to ED and underwent laparoscopic cholecystectomy that afternoon. Today cardiology consulted for elevated troponin and abnormal EKG.     Patient reports she became very short of breath overnight with coughing episodes, she has been experiencing midsternal chest discomfort and left upper chest/shoulder pain since surgery yesterday (believes this may be related to air from laparoscopic procedure). BP presently 102/64 and O2 sat 87-90% on 3L NC. EKG shows diffuse ST elevation. Troponin 152 and BNP 6,200. Echo with normal systolic function, EF 70% with moderate right ventricular and right atrial enlargement. LHC performed 5 months ago in 9/2021 with clean coronary arteries    * Calculus of gallbladder without cholecystitis without obstruction  - Being followed by general surgery    Elevated troponin  - Initial troponin 152, continue trending    2/10/2022:  - Troponin peaked at 603,  trended down 224    Abnormal EKG  - Patient seen and evaluated by Dr. Camargo  - EKG SR with diffuse ST elevation without reciprocal changes  - [facility] 5 months ago 9/17/2021 with clean coronaries  - Stat echo performed which revealed normal systolic function, EF 70%, no wall motion abnormality, moderate right atrial and right ventricular enlargement.  - Troponin 152, will continue trending, BNP 6200, O2 sat 87-90% on 3L NC when patient is talking  - Findings above are very suspicious for PE and not ACS, start IV heparin PE protocol and ordered stat CT PE  - Pending CT results, will consider pulmonary thrombectomy if needed  - Strict bedrest for now, telemetry monitoring and O2 monitoring    2/10/2022:  - CT PE negative for PE  - Still requiring supplemental O2, sat 92% on 2L  - Plan for [facility] today. Procedure, risk and benefits discussed in detail with patient and her . They verbalized understanding and wish to proceed.  - Further recommendations to follow.    Hypoxemia requiring supplemental oxygen  - Currently on 3L NC with O2sat 87-90%  - Continue monitoring    2/10/2022:  - O2 sat 92% on 2L NC        VTE Risk Mitigation (From admission, onward)         Ordered     IP VTE LOW RISK PATIENT  Once         02/08/22 1009     Place sequential compression device  Until discontinued         02/08/22 1009                CINDY Recinos  Cardiology  [facility] - Cath Lab

## 2022-02-10 NOTE — SUBJECTIVE & OBJECTIVE
Interval History: shortness of breath  immproved today  elevated BNP abnormal ecg, elevated troponin trending down  CT negative for PE, negative dopplers possible LLL pneumonia, cards plans for [facility]    Medications:  Continuous Infusions:   sodium chloride 0.45% Stopped (02/10/22 1202)    dextrose 5 % and 0.45 % NaCl 50 mL/hr at 02/10/22 0700    lactated ringers 125 mL/hr at 02/09/22 0339    lactated ringers       Scheduled Meds:   cetirizine  10 mg Oral Daily    EScitalopram oxalate  10 mg Oral Daily    fluticasone propionate  1 spray Each Nostril BID    hydroCHLOROthiazide  12.5 mg Oral Daily    iron fum-B12-IF-C-folic acid  1 capsule Oral BID    LIDOcaine (PF) 10 mg/ml (1%)  1 mL Other Once    pantoprazole  40 mg Oral Daily    piperacillin-tazobactam (ZOSYN) IVPB  4.5 g Intravenous Q8H     PRN Meds:sodium chloride 0.45%, acetaminophen, acetaminophen, albuterol, albuterol sulfate, albuterol-ipratropium, cloNIDine, HYDROcodone-acetaminophen, hydrOXYzine pamoate, ketorolac, metoclopramide HCl, morphine, morphine, ondansetron     Review of patient's allergies indicates:  No Known Allergies  Objective:     Vital Signs (Most Recent):  Temp: 98.2 °F (36.8 °C) (02/10/22 1230)  Pulse: 99 (02/10/22 1230)  Resp: 16 (02/10/22 1230)  BP: (!) 140/80 (02/10/22 1230)  SpO2: (!) 94 % (02/10/22 1230) Vital Signs (24h Range):  Temp:  [97.8 °F (36.6 °C)-100.3 °F (37.9 °C)] 98.2 °F (36.8 °C)  Pulse:  [] 99  Resp:  [16-20] 16  SpO2:  [91 %-95 %] 94 %  BP: (104-140)/(72-80) 140/80     Weight: 73.9 kg (163 lb)  Body mass index is 29.81 kg/m².    Intake/Output - Last 3 Shifts       02/08 0700  02/09 0659 02/09 0700  02/10 0659 02/10 0700  02/11 0659    I.V. (mL/kg)  300 (4.1) 350 (4.7)    Total Intake(mL/kg)  300 (4.1) 350 (4.7)    Urine (mL/kg/hr)  800 (0.5) 700 (1.7)    Drains 40      Total Output 40 800 700    Net -40 -500 -350                 Physical Exam  Vitals and nursing note reviewed.   HENT:      Head:  Normocephalic.      Mouth/Throat:      Mouth: Mucous membranes are moist.   Abdominal:      General: Abdomen is flat.      Palpations: Abdomen is soft.      Comments: afua serous  dsg c/d/i  Appropriate incisional tenderness  Pre op ruq tenderness resolved   Musculoskeletal:         General: Normal range of motion.   Skin:     General: Skin is warm and dry.      Capillary Refill: Capillary refill takes less than 2 seconds.   Neurological:      Mental Status: She is alert.   Psychiatric:         Mood and Affect: Mood normal.         Significant Labs:  I have reviewed all pertinent lab results within the past 24 hours.  CBC:   Recent Labs   Lab 02/10/22  0449   WBC 7.69   RBC 3.17*   HGB 9.0*   HCT 26.1*      MCV 82.3   MCH 28.4   MCHC 34.5     BMP:   Recent Labs   Lab 02/10/22  0449   *      K 3.6      CO2 25   BUN 11   CREATININE 1.09*   CALCIUM 7.6*       Significant Diagnostics:  I have reviewed all pertinent imaging results/findings within the past 24 hours.

## 2022-02-10 NOTE — ASSESSMENT & PLAN NOTE
- Currently on 3L NC with O2sat 87-90%  - Continue monitoring    2/10/2022:  - O2 sat 92% on 2L NC

## 2022-02-10 NOTE — HPI
"Patient is a 61yo female who presented to [facility] ED with abdominal pain, subsequently found to have a calculus of the gallbladder without cholecystitis. Patient had a subtotal cholecystectomy on 2/08. After surgery, patient was experiencing some shortness of breath with chest discomfort. Cardiology was consulted to evaluate the patient. Her EKG showed sinus rhythm with diffuse ST elevations without reciprocal changes, and her troponin was elevated. A heparin infusion was started per PE protocol, pending CTA results. Her echo revealed normal systolic function, EF 70%, no wall motion abnormality, moderate right atrial and right ventricular enlargement. The CT showed no evidence of PE. However, it did show bibasilar atelectasis with a possible developing right basilar pneumonia. Internal medicine was consulted for further evaluation.    The patient states that her pain is located on the far left side of her chest, just medial to her shoulder, and around the left scapular border. She states this has been present since she woke up from surgery and believes it is due to the air used during the laparoscopic procedure. She describes it as "pressure" that has been constant, but not severe. She has some associated shortness of breath, but denies other symptoms. Her pain is not related to position or movement, and there are no aggravating factors.   "

## 2022-02-10 NOTE — CONSULTS
"[facility]  Consult Note    Patient Name: Radha Crockett  MRN: [id number]  Admission Date: 2/8/2022  Hospital Length of Stay: 0 days  Attending Physician: Steve Florez MD   Primary Care Provider: Eric Benavides DO           Patient information was obtained from patient and past medical records.     Inpatient consult to Hospital Medicine  Consult performed by: Li Kaur MD  Consult ordered by: Enrique Jara DO        Subjective:     Principal Problem: Calculus of gallbladder without cholecystitis without obstruction    Chief Complaint:   Chief Complaint   Patient presents with    Abdominal Pain        HPI: Patient is a 61yo female who presented to [facility] ED with abdominal pain, subsequently found to have a calculus of the gallbladder without cholecystitis. Patient had a subtotal cholecystectomy on 2/08. After surgery, patient was experiencing some shortness of breath with chest discomfort. Cardiology was consulted to evaluate the patient. Her EKG showed sinus rhythm with diffuse ST elevations without reciprocal changes, and her troponin was elevated. A heparin infusion was started per PE protocol, pending CTA results. Her echo revealed normal systolic function, EF 70%, no wall motion abnormality, moderate right atrial and right ventricular enlargement. The CT showed no evidence of PE. However, it did show bibasilar atelectasis with a possible developing right basilar pneumonia. Internal medicine was consulted for further evaluation.    The patient states that her pain is located on the far left side of her chest, just medial to her shoulder, and around the left scapular border. She states this has been present since she woke up from surgery and believes it is due to the air used during the laparoscopic procedure. She describes it as "pressure" that has been constant, but not severe. She has some associated shortness of breath, but denies other symptoms. Her pain is not " related to position or movement, and there are no aggravating factors.       Past Medical History:   Diagnosis Date    Allergic rhinitis, mild     Anemia     Anxiety     Degenerative disc disease, cervical     Depression     GERD (gastroesophageal reflux disease)     Hyperlipidemia     Hypertension     Insomnia     Migraine     Vitamin D deficiency        Past Surgical History:   Procedure Laterality Date    ANGIOGRAM, CORONARY, WITH LEFT HEART CATHETERIZATION Left 9/17/2021    Procedure: Left heart cath w/ coronary angiograms;  Surgeon: Demi Carias MD;  Location: [facility] CATH LAB;  Service: Cardiology;  Laterality: Left;    INGUINAL HERNIA REPAIR Right 1968    LAPAROSCOPIC CHOLECYSTECTOMY WITH CHOLANGIOGRAPHY N/A 2/8/2022    Procedure: CHOLECYSTECTOMY, LAPAROSCOPIC, WITH CHOLANGIOGRAM;  Surgeon: Steve Florez MD;  Location: [facility] OR;  Service: General;  Laterality: N/A;       Review of patient's allergies indicates:  No Known Allergies    No current facility-administered medications on file prior to encounter.     Current Outpatient Medications on File Prior to Encounter   Medication Sig    albuterol (PROVENTIL/VENTOLIN HFA) 90 mcg/actuation inhaler Inhale 2 puffs into the lungs every 6 (six) hours as needed for Wheezing. Rescue    budesonide-glycopyr-formoterol (BREZTRI AEROSPHERE) 160-9-4.8 mcg/actuation HFAA Inhale 2 puffs into the lungs once daily. (Patient not taking: Reported on 2/7/2022)    celecoxib (CELEBREX) 200 MG capsule Take 1 capsule (200 mg total) by mouth once daily.    cetirizine (ZYRTEC) 10 MG tablet Take 10 mg by mouth once daily.    ciprofloxacin HCl (CIPRO) 500 MG tablet Take 1 tablet (500 mg total) by mouth every 12 (twelve) hours.    EScitalopram oxalate (LEXAPRO) 10 MG tablet Take 1 tablet (10 mg total) by mouth once daily.    fluticasone propionate (FLONASE) 50 mcg/actuation nasal spray 1 spray by Each Nostril route 2 (two) times a day.     hydroCHLOROthiazide (HYDRODIURIL) 12.5 MG Tab Take 12.5 mg by mouth once daily.    HYDROcodone-acetaminophen (NORCO) 7.5-325 mg per tablet Take 1 tablet by mouth every 6 (six) hours as needed for Pain.    hydrOXYzine pamoate (VISTARIL) 25 MG Cap Take 1 capsule (25 mg total) by mouth 2 (two) times daily as needed (for anxiety).    iron fum-B12-IF-C-folic acid (FOLTRIN) 110-0.5 mg capsule Take 1 capsule by mouth 2 (two) times daily.    omeprazole (PRILOSEC) 20 MG capsule Take 1 capsule (20 mg total) by mouth 2 (two) times a day.    ondansetron (ZOFRAN-ODT) 4 MG TbDL Take 1 tablet (4 mg total) by mouth 2 (two) times daily.    pantoprazole (PROTONIX) 40 MG tablet Take 1 tablet (40 mg total) by mouth once daily.    sumatriptan (IMITREX) 100 MG tablet Take 1 tablet (100 mg total) by mouth every 2 (two) hours as needed for Migraine (do not take over 200 mg in 24 hrs). 1 tablet by mouth at onset of headache,may repeat once in 24 hours if no relief.    tiZANidine (ZANAFLEX) 4 MG tablet Take 1 tablet (4 mg total) by mouth nightly.     Family History     Problem Relation (Age of Onset)    Arthritis Other    Cancer Father    Diabetes Other    Hypertension Father        Tobacco Use    Smoking status: Former Smoker     Types: Cigarettes     Quit date: 10/2009     Years since quittin.3    Smokeless tobacco: Never Used   Substance and Sexual Activity    Alcohol use: Never    Drug use: Never    Sexual activity: Yes     Partners: Male     Birth control/protection: Post-menopausal     Review of Systems   Constitutional: Positive for activity change. Negative for chills and fever.   Respiratory: Positive for shortness of breath. Negative for cough and wheezing.         Pain with deep inspiration   Cardiovascular: Positive for chest pain (left-sided, radiates to back/shoulder). Negative for palpitations.   Gastrointestinal: Positive for abdominal pain.   Neurological: Negative for dizziness and light-headedness.      Objective:     Vital Signs (Most Recent):  Temp: 100.3 °F (37.9 °C) (02/09/22 1712)  Pulse: (!) 112 (02/09/22 1712)  Resp: 18 (02/09/22 1712)  BP: 119/72 (02/09/22 1712)  SpO2: (!) 91 % (02/09/22 2055) Vital Signs (24h Range):  Temp:  [98.7 °F (37.1 °C)-100.8 °F (38.2 °C)] 100.3 °F (37.9 °C)  Pulse:  [] 112  Resp:  [18-20] 18  SpO2:  [90 %-99 %] 91 %  BP: ()/(52-77) 119/72     Weight: 73.9 kg (163 lb)  Body mass index is 29.81 kg/m².    Physical Exam  Constitutional:       Appearance: Normal appearance. She is not ill-appearing or diaphoretic.   HENT:      Head: Normocephalic.      Mouth/Throat:      Mouth: Mucous membranes are moist.      Pharynx: Oropharynx is clear.   Eyes:      Conjunctiva/sclera: Conjunctivae normal.      Pupils: Pupils are equal, round, and reactive to light.   Cardiovascular:      Rate and Rhythm: Normal rate and regular rhythm.      Heart sounds: Normal heart sounds.   Pulmonary:      Effort: Pulmonary effort is normal. No respiratory distress.      Breath sounds: Normal breath sounds. No wheezing.   Chest:      Chest wall: No tenderness.   Abdominal:      Comments: HAYDEN drain in place. Appropriately tender to palpation postop   Skin:     General: Skin is warm and dry.      Coloration: Skin is not jaundiced or pale.   Neurological:      Mental Status: She is alert and oriented to person, place, and time.         Significant Labs: All pertinent labs within the past 24 hours have been reviewed.    Significant Imaging: I have reviewed all pertinent imaging results/findings within the past 24 hours.    Assessment/Plan:     * Calculus of gallbladder without cholecystitis without obstruction  - s/p subtotal cholecystectomy with Dr. Florez    Chest pain  - Patient does complain of pain but it is localized to the L side and extending into her shoulder and L scapular region  - Consistent with postop pain 2/2 diaphragm irritation from insufflation, noted by primary surgical team  -  Evaluation for life threatening causes of chest pain including ACS and PE has been performed and deemed unlikely to cause this pain    Elevated troponin  - Troponin elevations noted postop with initial 152 and most recent 603  - EKG with nonspecific ST changes  - Patient had [facility] 9/21 with clean coronary arteries  - Cardiology following patient, aware of increased troponin and do not believe acute ischemia is likely cause at this time    Hypoxemia requiring supplemental oxygen  - Given tachycardia and O2 requirement, workup for PE performed  - CTA chest negative for PE and BLE US negative for DVT  - Patient placed on heparin when PE initially suspected, given negative workup will discontinue  - Likely due to decreased respiratory effort 2/2 pain    VTE Risk Mitigation (From admission, onward)         Ordered     IP VTE LOW RISK PATIENT  Once         02/08/22 1009     Place sequential compression device  Until discontinued         02/08/22 1009                    Thank you for your consult. I will follow-up with patient. Please contact us if you have any additional questions.    Sunitha Blanca MD  Department of Hospital Medicine   [facility] Orth[facility]

## 2022-02-10 NOTE — ASSESSMENT & PLAN NOTE
- Patient seen and evaluated by Dr. Camargo  - EKG SR with diffuse ST elevation without reciprocal changes  - [facility] 5 months ago 9/17/2021 with clean coronaries  - Stat echo performed which revealed normal systolic function, EF 70%, no wall motion abnormality, moderate right atrial and right ventricular enlargement.  - Troponin 152, will continue trending, BNP 6200, O2 sat 87-90% on 3L NC when patient is talking  - Findings above are very suspicious for PE and not ACS, start IV heparin PE protocol and ordered stat CT PE  - Pending CT results, will consider pulmonary thrombectomy if needed  - Strict bedrest for now, telemetry monitoring and O2 monitoring    2/10/2022:  - CT PE negative for PE  - Still requiring supplemental O2, sat 92% on 2L  - Plan for [facility] today. Procedure, risk and benefits discussed in detail with patient and her . They verbalized understanding and wish to proceed.  - Further recommendations to follow.

## 2022-02-10 NOTE — HOSPITAL COURSE
Patient arrived at the ED on 2/08 complaining of abdominal pain and was found to have a calculus of the gallbladder without cholecystitis. Patient was admitted for surgery and received a subtotal cholecystectomy on the same day by Dr. Florez. Following her surgery, she developed some shortness of breath and chest discomfort. She was evaluated by cardiology on 2/09 and started on a heparin infusion for possible PE pending CTA chest results, which showed no evidence of a PE. Inpatient medicine was then consulted for further workup.    02/10 - Prior episode SOB after surgery she feels from deep sedation after surgery. Says usually no CP or SOB. Has had neg CTA chest and negative LHC. In room with . Pt hx large snorer and feels non rested from sleep. No prior sleep study but with above finding and with abnoral echo would recommond outpt sleep study. Follow

## 2022-02-10 NOTE — ASSESSMENT & PLAN NOTE
- Initial troponin 152, continue trending    2/10/2022:  - Troponin peaked at 603, trended down 224

## 2022-02-10 NOTE — ASSESSMENT & PLAN NOTE
- Given tachycardia and O2 requirement, workup for PE performed  - CTA chest negative for PE and BLE US negative for DVT  - Patient placed on heparin when PE initially suspected, given negative workup will discontinue  - Likely due to decreased respiratory effort 2/2 pain

## 2022-02-10 NOTE — SUBJECTIVE & OBJECTIVE
Past Medical History:   Diagnosis Date    Allergic rhinitis, mild     Anemia     Anxiety     Degenerative disc disease, cervical     Depression     GERD (gastroesophageal reflux disease)     Hyperlipidemia     Hypertension     Insomnia     Migraine     Vitamin D deficiency        Past Surgical History:   Procedure Laterality Date    ANGIOGRAM, CORONARY, WITH LEFT HEART CATHETERIZATION Left 9/17/2021    Procedure: Left heart cath w/ coronary angiograms;  Surgeon: Demi Carias MD;  Location: [facility] CATH LAB;  Service: Cardiology;  Laterality: Left;    INGUINAL HERNIA REPAIR Right 1968    LAPAROSCOPIC CHOLECYSTECTOMY WITH CHOLANGIOGRAPHY N/A 2/8/2022    Procedure: CHOLECYSTECTOMY, LAPAROSCOPIC, WITH CHOLANGIOGRAM;  Surgeon: Steve Florez MD;  Location: [facility] OR;  Service: General;  Laterality: N/A;       Review of patient's allergies indicates:  No Known Allergies    No current facility-administered medications on file prior to encounter.     Current Outpatient Medications on File Prior to Encounter   Medication Sig    albuterol (PROVENTIL/VENTOLIN HFA) 90 mcg/actuation inhaler Inhale 2 puffs into the lungs every 6 (six) hours as needed for Wheezing. Rescue    budesonide-glycopyr-formoterol (BREZTRI AEROSPHERE) 160-9-4.8 mcg/actuation HFAA Inhale 2 puffs into the lungs once daily. (Patient not taking: Reported on 2/7/2022)    celecoxib (CELEBREX) 200 MG capsule Take 1 capsule (200 mg total) by mouth once daily.    cetirizine (ZYRTEC) 10 MG tablet Take 10 mg by mouth once daily.    ciprofloxacin HCl (CIPRO) 500 MG tablet Take 1 tablet (500 mg total) by mouth every 12 (twelve) hours.    EScitalopram oxalate (LEXAPRO) 10 MG tablet Take 1 tablet (10 mg total) by mouth once daily.    fluticasone propionate (FLONASE) 50 mcg/actuation nasal spray 1 spray by Each Nostril route 2 (two) times a day.    hydroCHLOROthiazide (HYDRODIURIL) 12.5 MG Tab Take 12.5 mg by mouth once daily.     HYDROcodone-acetaminophen (NORCO) 7.5-325 mg per tablet Take 1 tablet by mouth every 6 (six) hours as needed for Pain.    hydrOXYzine pamoate (VISTARIL) 25 MG Cap Take 1 capsule (25 mg total) by mouth 2 (two) times daily as needed (for anxiety).    iron fum-B12-IF-C-folic acid (FOLTRIN) 110-0.5 mg capsule Take 1 capsule by mouth 2 (two) times daily.    omeprazole (PRILOSEC) 20 MG capsule Take 1 capsule (20 mg total) by mouth 2 (two) times a day.    ondansetron (ZOFRAN-ODT) 4 MG TbDL Take 1 tablet (4 mg total) by mouth 2 (two) times daily.    pantoprazole (PROTONIX) 40 MG tablet Take 1 tablet (40 mg total) by mouth once daily.    sumatriptan (IMITREX) 100 MG tablet Take 1 tablet (100 mg total) by mouth every 2 (two) hours as needed for Migraine (do not take over 200 mg in 24 hrs). 1 tablet by mouth at onset of headache,may repeat once in 24 hours if no relief.    tiZANidine (ZANAFLEX) 4 MG tablet Take 1 tablet (4 mg total) by mouth nightly.     Family History     Problem Relation (Age of Onset)    Arthritis Other    Cancer Father    Diabetes Other    Hypertension Father        Tobacco Use    Smoking status: Former Smoker     Types: Cigarettes     Quit date: 10/2009     Years since quittin.3    Smokeless tobacco: Never Used   Substance and Sexual Activity    Alcohol use: Never    Drug use: Never    Sexual activity: Yes     Partners: Male     Birth control/protection: Post-menopausal     Review of Systems   Constitutional: Positive for activity change. Negative for chills and fever.   Respiratory: Positive for shortness of breath. Negative for cough and wheezing.         Pain with deep inspiration   Cardiovascular: Positive for chest pain (left-sided, radiates to back/shoulder). Negative for palpitations.   Gastrointestinal: Positive for abdominal pain.   Neurological: Negative for dizziness and light-headedness.     Objective:     Vital Signs (Most Recent):  Temp: 100.3 °F (37.9 °C) (22  1712)  Pulse: (!) 112 (02/09/22 1712)  Resp: 18 (02/09/22 1712)  BP: 119/72 (02/09/22 1712)  SpO2: (!) 91 % (02/09/22 2055) Vital Signs (24h Range):  Temp:  [98.7 °F (37.1 °C)-100.8 °F (38.2 °C)] 100.3 °F (37.9 °C)  Pulse:  [] 112  Resp:  [18-20] 18  SpO2:  [90 %-99 %] 91 %  BP: ()/(52-77) 119/72     Weight: 73.9 kg (163 lb)  Body mass index is 29.81 kg/m².    Physical Exam  Constitutional:       Appearance: Normal appearance. She is not ill-appearing or diaphoretic.   HENT:      Head: Normocephalic.      Mouth/Throat:      Mouth: Mucous membranes are moist.      Pharynx: Oropharynx is clear.   Eyes:      Conjunctiva/sclera: Conjunctivae normal.      Pupils: Pupils are equal, round, and reactive to light.   Cardiovascular:      Rate and Rhythm: Normal rate and regular rhythm.      Heart sounds: Normal heart sounds.   Pulmonary:      Effort: Pulmonary effort is normal. No respiratory distress.      Breath sounds: Normal breath sounds. No wheezing.   Chest:      Chest wall: No tenderness.   Abdominal:      Comments: HAYDEN drain in place. Appropriately tender to palpation postop   Skin:     General: Skin is warm and dry.      Coloration: Skin is not jaundiced or pale.   Neurological:      Mental Status: She is alert and oriented to person, place, and time.         Significant Labs: All pertinent labs within the past 24 hours have been reviewed.    Significant Imaging: I have reviewed all pertinent imaging results/findings within the past 24 hours.

## 2022-02-10 NOTE — ASSESSMENT & PLAN NOTE
- Troponin elevations noted postop with initial 152 and most recent 603  - EKG with nonspecific ST changes  - Patient had [facility] 9/21 with clean coronary arteries  - Cardiology following patient, aware of increased troponin and do not believe acute ischemia is likely cause at this time

## 2022-02-10 NOTE — SUBJECTIVE & OBJECTIVE
Interval History: Patient seen today, reports sob has improved, continues to have right shoulder discomfort (s/p laparoscopic cholecystectomy 2/8/2022). CT PE was negative for PE, Troponin peaked at 603 and trended down to 224. Currently on IV heparin.    Review of Systems   Constitutional: Negative for chills and fever.   Cardiovascular: Positive for chest pain. Negative for leg swelling, orthopnea and palpitations.   Respiratory: Positive for cough, shortness of breath and sleep disturbances due to breathing.         SOB improving   Gastrointestinal: Positive for abdominal pain.        S/P laparoscopic cholecystectomy 2/8/2022.   All other systems reviewed and are negative.    Objective:     Vital Signs (Most Recent):  Temp: 98.6 °F (37 °C) (02/10/22 0720)  Pulse: 93 (02/10/22 0720)  Resp: 18 (02/10/22 0720)  BP: 118/79 (02/10/22 0720)  SpO2: (!) 92 % (02/10/22 0720) Vital Signs (24h Range):  Temp:  [97.8 °F (36.6 °C)-100.3 °F (37.9 °C)] 98.6 °F (37 °C)  Pulse:  [] 93  Resp:  [18-20] 18  SpO2:  [90 %-95 %] 92 %  BP: (104-119)/(72-79) 118/79     Weight: 73.9 kg (163 lb)  Body mass index is 29.81 kg/m².     SpO2: (!) 92 %  O2 Device (Oxygen Therapy): nasal cannula      Intake/Output Summary (Last 24 hours) at 2/10/2022 1042  Last data filed at 2/10/2022 0700  Gross per 24 hour   Intake 650 ml   Output 1500 ml   Net -850 ml       Lines/Drains/Airways     Drain                 Closed/Suction Drain 02/08/22 1715 RUQ Bulb 1 day          Peripheral Intravenous Line                 Peripheral IV - Single Lumen 02/08/22 0946 20 G Left Antecubital 2 days         Peripheral IV - Single Lumen 02/09/22 1755 22 G Posterior;Right Hand <1 day                Physical Exam  Vitals reviewed.   Constitutional:       General: She is not in acute distress.     Appearance: She is not toxic-appearing or diaphoretic.   Cardiovascular:      Rate and Rhythm: Normal rate and regular rhythm.      Pulses: Normal pulses.      Heart  sounds: Normal heart sounds. No murmur heard.      Pulmonary:      Effort: Tachypnea present.      Breath sounds: No wheezing, rhonchi or rales.   Abdominal:      General: Bowel sounds are normal.      Palpations: Abdomen is soft.      Tenderness: There is abdominal tenderness.   Musculoskeletal:      Cervical back: Neck supple.   Neurological:      Mental Status: She is alert and oriented to person, place, and time.         Significant Labs:   ABG: No results for input(s): PH, PCO2, HCO3, POCSATURATED, BE in the last 48 hours., Blood Culture: No results for input(s): LABBLOO in the last 48 hours., BMP:   Recent Labs   Lab 02/09/22  0502 02/10/22  0449   * 123*   * 139   K 3.9 3.6    103   CO2 21 25   BUN 17 11   CREATININE 1.51* 1.09*   CALCIUM 7.1* 7.6*   , CMP   Recent Labs   Lab 02/09/22  0502 02/10/22  0449   * 139   K 3.9 3.6    103   CO2 21 25   * 123*   BUN 17 11   CREATININE 1.51* 1.09*   CALCIUM 7.1* 7.6*   PROT 5.1* 5.3*   ALBUMIN 2.2* 2.2*   BILITOT 1.0 0.8   ALKPHOS 230* 214*   AST 55* 27   ALT 41 27   ANIONGAP 14 15   EGFRNONAA 37* 54*   , CBC   Recent Labs   Lab 02/09/22  0007 02/09/22  0007 02/09/22  0502 02/09/22  0502 02/10/22  0449   WBC 10.52  --  8.51  --  7.69   HGB 9.4*  --  9.1*  --  9.0*   HCT 28.3*   < > 27.0*   < > 26.1*     --  201  --  236    < > = values in this interval not displayed.   , INR   Recent Labs   Lab 02/09/22  1539   INR 1.11*   , Lipid Panel No results for input(s): CHOL, HDL, LDLCALC, TRIG, CHOLHDL in the last 48 hours. and Troponin No results for input(s): TROPONINI in the last 48 hours.    Significant Imaging: Cardiac Cath: [facility] 9/2021 with clean coronaries, Echocardiogram:   Transthoracic echo (TTE) complete (Cupid Only):   Results for orders placed or performed during the hospital encounter of 02/08/22   Echo   Result Value Ref Range    BSA 1.8 m2    Right Atrial Pressure (from IVC) 15 mmHg    EF 70 %    Left Ventricular  Outflow Tract Mean Gradient 1.00 mmHg    AORTIC VALVE CUSP SEPERATION 1.83 cm    LVIDd 3.50 (A) 3.5 - 6.0 cm    IVS 0.94 0.6 - 1.1 cm    Posterior Wall 0.71 0.6 - 1.1 cm    Ao root annulus 2.70 cm    LVIDs 1.66 (A) 2.1 - 4.0 cm    FS 53 28 - 44 %    IVC ostium 1.9 cm    LV mass 78.57 g    LA size 2.90 cm    RVDD 4.50 cm    TAPSE 1.60 cm    Left Ventricle Relative Wall Thickness 0.41 cm    AV mean gradient 3 mmHg    AV valve area 1.46 cm2    AV Velocity Ratio 0.70     AV index (prosthetic) 0.64     E wave deceleration time 154 msec    LVOT diameter 1.70 cm    LVOT area 2.3 cm2    LVOT peak pankaj 0.7 m/s    LVOT peak VTI 9.00 cm    Ao peak pankaj 1.0 m/s    Ao VTI 14.00 cm    LVOT stroke volume 20.42 cm3    AV peak gradient 4 mmHg    TV rest pulmonary artery pressure 36 mmHg    MV Peak E Pankaj 0.71 m/s    TR Max Pankaj 2.30 m/s    LV Systolic Volume 16.58 mL    LV Systolic Volume Index 9.5 mL/m2    LV Diastolic Volume 42.85 mL    LV Diastolic Volume Index 24.49 mL/m2    LV Mass Index 45 g/m2    Echo EF Estimated 60 %    RA Major Axis 3.90 cm    Triscuspid Valve Regurgitation Peak Gradient 21 mmHg    Narrative    · Sinus rhythm, rate 95.  · The left ventricle is normal in size with normal systolic function.  · The estimated ejection fraction is 70%.  · Normal left ventricular diastolic function.  · Atrial fibrillation not observed.  · Moderate right ventricular enlargement.  · Moderate right atrial enlargement.  · Mild tricuspid regurgitation.  · Elevated central venous pressure (15 mmHg).  · The estimated PA systolic pressure is 36 mmHg.  · Trivial pericardial effusion.      , EKG: reviewed and X-Ray: CXR: X-Ray Chest 1 View (CXR):   Results for orders placed or performed during the hospital encounter of 02/08/22   X-Ray Chest 1 View    Narrative    EXAMINATION:  XR CHEST 1 VIEW    CLINICAL HISTORY:  shortness of breath;    TECHNIQUE:  Single frontal view of the chest was  performed.    COMPARISON:  06/09/2021    FINDINGS:  There is a patchy density in the right lung base and minimally in the left lung base.  The upper lungs are clear.  No pneumothorax.  No pleural effusion.      Impression    Bibasilar atelectatic changes.      Electronically signed by: Kendall Ray  Date:    02/09/2022  Time:    10:00

## 2022-02-10 NOTE — ASSESSMENT & PLAN NOTE
- Patient does complain of pain but it is localized to the L side and extending into her shoulder and L scapular region  - Consistent with postop pain 2/2 diaphragm irritation from insufflation, noted by primary surgical team  - Evaluation for life threatening causes of chest pain including ACS and PE has been performed and deemed unlikely to cause this pain

## 2022-02-10 NOTE — NURSING
20:16-dr schwartz notified of troponin 603 and pt having slight chest tigheness. Ordered to get ekg and consult hospitalist for chest pain eval.  21:00-ekg completed  Dr kunz notifed of consult and faxed ekg

## 2022-02-10 NOTE — ASSESSMENT & PLAN NOTE
02/08/2022 acute cholecystitis with cholelithiasis, iv abx zosyn IV hydration  lap casie per dr lira, instructed on risks benefits possible require open possible need drain or   Additional procedures, possible cholangiogram pain control she agrees to proceed with surgery    02/09/2022 trend LFTs afua serous     02/10/2022 LFTs improving, repeat LFT am  afua gallego, LHC today per cardiology,

## 2022-02-11 VITALS
WEIGHT: 163 LBS | TEMPERATURE: 98 F | BODY MASS INDEX: 30 KG/M2 | RESPIRATION RATE: 18 BRPM | HEART RATE: 94 BPM | DIASTOLIC BLOOD PRESSURE: 68 MMHG | SYSTOLIC BLOOD PRESSURE: 101 MMHG | OXYGEN SATURATION: 92 % | HEIGHT: 62 IN

## 2022-02-11 PROBLEM — I27.20 PULMONARY HTN: Status: ACTIVE | Noted: 2022-02-11

## 2022-02-11 LAB
ALBUMIN SERPL BCP-MCNC: 2.2 G/DL (ref 3.5–5)
ALP SERPL-CCNC: 257 U/L (ref 50–130)
ALT SERPL W P-5'-P-CCNC: 22 U/L (ref 13–56)
AST SERPL W P-5'-P-CCNC: 20 U/L (ref 15–37)
BASOPHILS # BLD AUTO: 0.03 K/UL (ref 0–0.2)
BASOPHILS NFR BLD AUTO: 0.4 % (ref 0–1)
BILIRUB DIRECT SERPL-MCNC: 0.6 MG/DL (ref 0–0.2)
BILIRUB SERPL-MCNC: 0.9 MG/DL (ref 0–1.2)
DIFFERENTIAL METHOD BLD: ABNORMAL
EOSINOPHIL # BLD AUTO: 0.14 K/UL (ref 0–0.5)
EOSINOPHIL NFR BLD AUTO: 1.9 % (ref 1–4)
ERYTHROCYTE [DISTWIDTH] IN BLOOD BY AUTOMATED COUNT: 13.8 % (ref 11.5–14.5)
HCT VFR BLD AUTO: 28.2 % (ref 38–47)
HGB BLD-MCNC: 9.1 G/DL (ref 12–16)
IMM GRANULOCYTES # BLD AUTO: 0.06 K/UL (ref 0–0.04)
IMM GRANULOCYTES NFR BLD: 0.8 % (ref 0–0.4)
LYMPHOCYTES # BLD AUTO: 0.82 K/UL (ref 1–4.8)
LYMPHOCYTES NFR BLD AUTO: 10.9 % (ref 27–41)
MCH RBC QN AUTO: 28.2 PG (ref 27–31)
MCHC RBC AUTO-ENTMCNC: 32.3 G/DL (ref 32–36)
MCV RBC AUTO: 87.3 FL (ref 80–96)
MONOCYTES # BLD AUTO: 0.49 K/UL (ref 0–0.8)
MONOCYTES NFR BLD AUTO: 6.5 % (ref 2–6)
MPC BLD CALC-MCNC: 9.3 FL (ref 9.4–12.4)
NEUTROPHILS # BLD AUTO: 6.01 K/UL (ref 1.8–7.7)
NEUTROPHILS NFR BLD AUTO: 79.5 % (ref 53–65)
NRBC # BLD AUTO: 0 X10E3/UL
NRBC, AUTO (.00): 0 %
PLATELET # BLD AUTO: 303 K/UL (ref 150–400)
PROT SERPL-MCNC: 5.3 G/DL (ref 6.4–8.2)
RBC # BLD AUTO: 3.23 M/UL (ref 4.2–5.4)
WBC # BLD AUTO: 7.55 K/UL (ref 4.5–11)

## 2022-02-11 PROCEDURE — 63600175 PHARM REV CODE 636 W HCPCS: Performed by: SURGERY

## 2022-02-11 PROCEDURE — 85025 COMPLETE CBC W/AUTO DIFF WBC: CPT | Performed by: NURSE PRACTITIONER

## 2022-02-11 PROCEDURE — 80076 HEPATIC FUNCTION PANEL: CPT | Performed by: NURSE PRACTITIONER

## 2022-02-11 PROCEDURE — 25000003 PHARM REV CODE 250: Performed by: NURSE PRACTITIONER

## 2022-02-11 PROCEDURE — 25000003 PHARM REV CODE 250: Performed by: SURGERY

## 2022-02-11 PROCEDURE — 96366 THER/PROPH/DIAG IV INF ADDON: CPT

## 2022-02-11 PROCEDURE — S5010 5% DEXTROSE AND 0.45% SALINE: HCPCS | Performed by: NURSE PRACTITIONER

## 2022-02-11 PROCEDURE — 36415 COLL VENOUS BLD VENIPUNCTURE: CPT | Performed by: NURSE PRACTITIONER

## 2022-02-11 PROCEDURE — G0378 HOSPITAL OBSERVATION PER HR: HCPCS

## 2022-02-11 RX ORDER — HYDROCODONE BITARTRATE AND ACETAMINOPHEN 7.5; 325 MG/1; MG/1
1 TABLET ORAL EVERY 6 HOURS PRN
Qty: 15 TABLET | Refills: 0 | Status: ON HOLD | OUTPATIENT
Start: 2022-02-11 | End: 2022-11-13 | Stop reason: HOSPADM

## 2022-02-11 RX ORDER — AMOXICILLIN AND CLAVULANATE POTASSIUM 875; 125 MG/1; MG/1
1 TABLET, FILM COATED ORAL EVERY 12 HOURS
Qty: 14 TABLET | Refills: 0 | Status: SHIPPED | OUTPATIENT
Start: 2022-02-11 | End: 2022-05-25

## 2022-02-11 RX ADMIN — Medication 1 CAPSULE: at 09:02

## 2022-02-11 RX ADMIN — HYDROCHLOROTHIAZIDE 12.5 MG: 12.5 TABLET ORAL at 09:02

## 2022-02-11 RX ADMIN — PIPERACILLIN SODIUM AND TAZOBACTAM SODIUM 4.5 G: 4; .5 INJECTION, POWDER, LYOPHILIZED, FOR SOLUTION INTRAVENOUS at 06:02

## 2022-02-11 RX ADMIN — ESCITALOPRAM OXALATE 10 MG: 10 TABLET ORAL at 09:02

## 2022-02-11 RX ADMIN — CETIRIZINE HYDROCHLORIDE 10 MG: 10 TABLET, FILM COATED ORAL at 09:02

## 2022-02-11 RX ADMIN — PANTOPRAZOLE SODIUM 40 MG: 40 TABLET, DELAYED RELEASE ORAL at 09:02

## 2022-02-11 RX ADMIN — FLUTICASONE PROPIONATE 50 MCG: 50 SPRAY, METERED NASAL at 09:02

## 2022-02-11 RX ADMIN — DEXTROSE AND SODIUM CHLORIDE: 5; 450 INJECTION, SOLUTION INTRAVENOUS at 05:02

## 2022-02-11 NOTE — DISCHARGE SUMMARY
[facility] - [facility]  General Surgery  Discharge Summary      Patient Name: Radha Crockett  MRN: [id number]  Admission Date: 2/8/2022  Hospital Length of Stay: 0 days  Discharge Date and Time:  02/11/2022 1:09 PM  Attending Physician: Steve Florez MD   Discharging Provider: KG Adrian  Primary Care Provider: Eric Benavides DO    HPI:   62-year-old female 62-year-old female with symptoms of biliaryhas been having symptoms  of biliary colic no wall thickening  no white count PCP Dr. Benavides was setting up to see surgeon outpatient however she woke up with severe epigastric, RUQ pain nausea this am and presented to [facility] ER General surgery was consulted, she does have mile elevated T Bilirubin 1.6 with AST 76, other LFTS normal possibly elevated from dehydration      Procedure(s) (LRB):  Left heart cath (Left)      Indwelling Lines/Drains at time of discharge:   Lines/Drains/Airways     Drain                 Closed/Suction Drain 02/08/22 1715 RUQ Bulb 2 days              Hospital Course: No notes on file 02/08/2022 acute cholecystitis with cholelithiasis, iv abx zosyn IV hydration  lap casie per dr florez, instructed on risks benefits possible require open possible need drain or   Additional procedures, possible cholangiogram pain control she agrees to proceed with surgery    Cholangiogram with filling defects flow into duodenum concern for possible choledocholithiasis postop hypotension, requiring fluid boluses she had increased shortness of breath elevated troponin proBNP abnormal EKG extremity negative for DVT, echo concerning for right heart strain pulmonary hypertension cardiology was consulted CTA chest negative for PE Dopplers lower extremities negative repeat left heart catheterization normal right radial access cardiology felt due to right heart strain on echo that likely had PT recommended Eliquis at discharge patient is doing better shortness of breath has improved LFTs trending pain has  improved DC today with AFUA drain follow-up with Dr. Florez next week with LFTs and possible outpatient ERCP follow-up with Cardiology 1 month with echo and outpatient sleep study per hospitalist recommendations instructed on AFUA drain care keep on for Eliquis given to patient    02/09/2022 trend LFTs afua serous     02/10/2022 LFTs improving, repeat LFT am  afua serous, LHC today per cardiology,       Goals of Care Treatment Preferences:  Code Status: Full Code      Consults: Dr. Camargo  Consults (From admission, onward)        Status Ordering Provider     Inpatient consult to Hospital Medicine  Once        Provider:  Li Kaur MD    Completed YAMILET GUERRA     Inpatient consult to Cardiology  Once        Provider:  (Not yet assigned)    Completed MORGAN LANGE     Inpatient consult to Hospital Medicine  Once        Provider:  Malik Alexander MD    Acknowledged MORGAN LANGE          Significant Diagnostic Studies:   Labs:   BMP:   Recent Labs   Lab 02/10/22  0449   *      K 3.6      CO2 25   BUN 11   CREATININE 1.09*   CALCIUM 7.6*   , CMP   Recent Labs   Lab 02/10/22  0449 02/11/22  0444     --    K 3.6  --      --    CO2 25  --    *  --    BUN 11  --    CREATININE 1.09*  --    CALCIUM 7.6*  --    PROT 5.3* 5.3*   ALBUMIN 2.2* 2.2*   BILITOT 0.8 0.9   ALKPHOS 214* 257*   AST 27 20   ALT 27 22   ANIONGAP 15  --    EGFRNONAA 54*  --    , CBC   Recent Labs   Lab 02/10/22  0449 02/10/22  0449 02/11/22  0444   WBC 7.69  --  7.55   HGB 9.0*  --  9.1*   HCT 26.1*   < > 28.2*     --  303    < > = values in this interval not displayed.   , INR   Lab Results   Component Value Date    INR 1.11 (H) 02/09/2022    INR 0.94 02/08/2022   , Lipid Panel   Lab Results   Component Value Date    CHOL 170 02/07/2022    HDL 63 (H) 02/07/2022    LDLCALC 92 02/07/2022    TRIG 77 02/07/2022    CHOLHDL 2.7 02/07/2022    and Troponin No results for input(s): TROPONINI in the  last 168 hours.  Radiology:   Cardiac Graphics: ECG: ST depression without reciprocal changes and Echocardiogram:   2D echo with color flow doppler: No results found for this or any previous visit. and Transthoracic echo (TTE) complete (Cupid Only):   Results for orders placed or performed during the hospital encounter of 02/08/22   Echo   Result Value Ref Range    BSA 1.8 m2    Right Atrial Pressure (from IVC) 15 mmHg    EF 70 %    Left Ventricular Outflow Tract Mean Gradient 1.00 mmHg    AORTIC VALVE CUSP SEPERATION 1.83 cm    LVIDd 3.50 (A) 3.5 - 6.0 cm    IVS 0.94 0.6 - 1.1 cm    Posterior Wall 0.71 0.6 - 1.1 cm    Ao root annulus 2.70 cm    LVIDs 1.66 (A) 2.1 - 4.0 cm    FS 53 28 - 44 %    IVC ostium 1.9 cm    LV mass 78.57 g    LA size 2.90 cm    RVDD 4.50 cm    TAPSE 1.60 cm    Left Ventricle Relative Wall Thickness 0.41 cm    AV mean gradient 3 mmHg    AV valve area 1.46 cm2    AV Velocity Ratio 0.70     AV index (prosthetic) 0.64     E wave deceleration time 154 msec    LVOT diameter 1.70 cm    LVOT area 2.3 cm2    LVOT peak pankaj 0.7 m/s    LVOT peak VTI 9.00 cm    Ao peak pankaj 1.0 m/s    Ao VTI 14.00 cm    LVOT stroke volume 20.42 cm3    AV peak gradient 4 mmHg    TV rest pulmonary artery pressure 36 mmHg    MV Peak E Pankaj 0.71 m/s    TR Max Pankaj 2.30 m/s    LV Systolic Volume 16.58 mL    LV Systolic Volume Index 9.5 mL/m2    LV Diastolic Volume 42.85 mL    LV Diastolic Volume Index 24.49 mL/m2    LV Mass Index 45 g/m2    Echo EF Estimated 60 %    RA Major Axis 3.90 cm    Triscuspid Valve Regurgitation Peak Gradient 21 mmHg    Narrative    · Sinus rhythm, rate 95.  · The left ventricle is normal in size with normal systolic function.  · The estimated ejection fraction is 70%.  · Normal left ventricular diastolic function.  · Atrial fibrillation not observed.  · Moderate right ventricular enlargement.  · Moderate right atrial enlargement.  · Mild tricuspid regurgitation.  · Elevated central venous pressure (15  mmHg).  · The estimated PA systolic pressure is 36 mmHg.  · Trivial pericardial effusion.        Specimen (24h ago, onward)            None          Pending Diagnostic Studies:     Procedure Component Value Units Date/Time    EKG 12-lead [[id number]] Collected: 02/09/22 2159    Order Status: Sent Lab Status: In process Updated: 02/10/22 0640    Narrative:      Test Reason : R77.8,    Vent. Rate : 116 BPM     Atrial Rate : 116 BPM     P-R Int : 186 ms          QRS Dur : 080 ms      QT Int : 316 ms       P-R-T Axes : 057 072 042 degrees     QTc Int : 439 ms    Sinus tachycardia  Nonspecific ST and T wave abnormality  Abnormal ECG  When compared with ECG of 09-FEB-2022 09:57,  Nonspecific T wave abnormality now evident in Inferior leads  Nonspecific T wave abnormality now evident in Lateral leads    Referred By: [email or web address]   SELF           Confirmed By:     EKG 12-lead [[id number]] Collected: 02/09/22 0957    Order Status: Sent Lab Status: In process Updated: 02/09/22 1007    Narrative:      Test Reason : R06.02,    Vent. Rate : 095 BPM     Atrial Rate : 095 BPM     P-R Int : 150 ms          QRS Dur : 084 ms      QT Int : 388 ms       P-R-T Axes : 057 058 052 degrees     QTc Int : 487 ms    Normal sinus rhythm  Low voltage QRS  Prolonged QT  Abnormal ECG  When compared with ECG of 17-SEP-2021 08:25,  Vent. rate has increased BY  32 BPM  QT has lengthened    Referred By: [email or web address]   SELF           Confirmed By:     EXTRA TUBES [[id number]] Collected: 02/09/22 1553    Order Status: Sent Lab Status: In process Updated: 02/09/22 1553    Specimen: Blood, Venous     Narrative:      The following orders were created for panel order EXTRA TUBES.  Procedure                               Abnormality         Status                     ---------                               -----------         ------                     Lavender Top Hold[[id number]]                                In process                 Gold Top Hold[[id number]]                                     In process                   Please view results for these tests on the individual orders.    EXTRA TUBES [[id number]] Collected: 02/08/22 0913    Order Status: Sent Lab Status: In process Updated: 02/08/22 0913    Specimen: Blood, Venous     Narrative:      The following orders were created for panel order EXTRA TUBES.  Procedure                               Abnormality         Status                     ---------                               -----------         ------                     Light Green Top Hold[[id number]]                             In process                 Gold Top Hold[[id number]]                                    In process                   Please view results for these tests on the individual orders.        Final Active Diagnoses:    Diagnosis Date Noted POA    PRINCIPAL PROBLEM:  Calculus of gallbladder without cholecystitis without obstruction [K80.20] 02/07/2022 Yes    Pulmonary HTN [I27.20] 02/11/2022 Yes    Hypoxemia requiring supplemental oxygen [R09.02, Z99.81] 02/09/2022 No    Abnormal EKG [R94.31] 02/09/2022 Yes    Elevated troponin [R77.8] 02/09/2022 Yes    Chest pain [R07.9] 06/25/2021 Yes      Problems Resolved During this Admission:      Discharged Condition: good    Disposition: Home or Self Care    Follow Up:   Follow-up Information     [facility]. Schedule an appointment as soon as possible for a visit in 2 weeks.    Why: nurse is going to call patient with appt.           Steve Florez MD. Call in 1 week.    Specialties: General Surgery, Surgery  Why: post op f/u on Feb.21 at 1:50  Contact information:  [address] [place] [phone]  [phone]             Demi Carias MD. Call in 4 weeks.    Specialties: Interventional Cardiology, Cardiology  Why: please follow up on March 15 at 10:45  Contact information:  [address] [place] [phone]  [phone]                       Patient Instructions:      Hepatic  Function Panel   Standing Status: Future Standing Exp. Date: 04/12/23     Diet Adult Regular     Lifting restrictions     No driving until:     Notify your health care provider if you experience any of the following:  temperature >100.4     Notify your health care provider if you experience any of the following:  persistent nausea and vomiting or diarrhea     Notify your health care provider if you experience any of the following:  redness, tenderness, or signs of infection (pain, swelling, redness, odor or green/yellow discharge around incision site)     Medications:  Reconciled Home Medications:      Medication List      START taking these medications    amoxicillin-clavulanate 875-125mg 875-125 mg per tablet  Commonly known as: AUGMENTIN  Take 1 tablet by mouth every 12 (twelve) hours.     apixaban 5 mg Tab  Commonly known as: ELIQUIS  Take 1 tablet (5 mg total) by mouth 2 (two) times daily.        CHANGE how you take these medications    * HYDROcodone-acetaminophen 7.5-325 mg per tablet  Commonly known as: NORCO  Take 1 tablet by mouth every 6 (six) hours as needed for Pain.  What changed: Another medication with the same name was added. Make sure you understand how and when to take each.     * HYDROcodone-acetaminophen 7.5-325 mg per tablet  Commonly known as: NORCO  Take 1 tablet by mouth every 6 (six) hours as needed for Pain.  What changed: You were already taking a medication with the same name, and this prescription was added. Make sure you understand how and when to take each.         * This list has 2 medication(s) that are the same as other medications prescribed for you. Read the directions carefully, and ask your doctor or other care provider to review them with you.            CONTINUE taking these medications    albuterol 90 mcg/actuation inhaler  Commonly known as: PROVENTIL/VENTOLIN HFA  Inhale 2 puffs into the lungs every 6 (six) hours as needed for Wheezing. Rescue     DigiumPHERE  160-9-4.8 mcg/actuation [facility]  Generic drug: budesonide-glycopyr-formoterol  Inhale 2 puffs into the lungs once daily.     celecoxib 200 MG capsule  Commonly known as: CeleBREX  Take 1 capsule (200 mg total) by mouth once daily.     cetirizine 10 MG tablet  Commonly known as: ZYRTEC  Take 10 mg by mouth once daily.     ciprofloxacin HCl 500 MG tablet  Commonly known as: CIPRO  Take 1 tablet (500 mg total) by mouth every 12 (twelve) hours.     EScitalopram oxalate 10 MG tablet  Commonly known as: LEXAPRO  Take 1 tablet (10 mg total) by mouth once daily.     fluticasone propionate 50 mcg/actuation nasal spray  Commonly known as: FLONASE  1 spray by Each Nostril route 2 (two) times a day.     hydroCHLOROthiazide 12.5 MG Tab  Commonly known as: HYDRODIURIL  Take 12.5 mg by mouth once daily.     hydrOXYzine pamoate 25 MG Cap  Commonly known as: VISTARIL  Take 1 capsule (25 mg total) by mouth 2 (two) times daily as needed (for anxiety).     iron fum-B12-IF-C-folic acid 110-0.5 mg capsule  Commonly known as: FOLTRIN  Take 1 capsule by mouth 2 (two) times daily.     omeprazole 20 MG capsule  Commonly known as: PRILOSEC  Take 1 capsule (20 mg total) by mouth 2 (two) times a day.     ondansetron 4 MG Tbdl  Commonly known as: ZOFRAN-ODT  Take 1 tablet (4 mg total) by mouth 2 (two) times daily.     pantoprazole 40 MG tablet  Commonly known as: PROTONIX  Take 1 tablet (40 mg total) by mouth once daily.     sumatriptan 100 MG tablet  Commonly known as: IMITREX  Take 1 tablet (100 mg total) by mouth every 2 (two) hours as needed for Migraine (do not take over 200 mg in 24 hrs). 1 tablet by mouth at onset of headache,may repeat once in 24 hours if no relief.     tiZANidine 4 MG tablet  Commonly known as: ZANAFLEX  Take 1 tablet (4 mg total) by mouth nightly.          Time spent on the discharge of patient: 60 minutes    KG Adrian  General Surgery  [facility] - Orthopedic

## 2022-02-11 NOTE — PROGRESS NOTES
Social visit.  Patient was in shower but  states that she was discharged and was to go home after getting out of shower.  No new problems reported during the night.  Discussed again with him recommendations to follow-up with Dr. Wilson in next few weeks for sleep evaluation.  Otherwise to continue with followups with primary care provider as prior.  With anticipated discharge will sign off.  Thank you again for consult

## 2022-02-11 NOTE — PLAN OF CARE
Problem: Adult Inpatient Plan of Care  Goal: Plan of Care Review  Outcome: Ongoing, Progressing  Flowsheets (Taken 2/10/2022 2346)  Plan of Care Reviewed With: patient  Goal: Patient-Specific Goal (Individualized)  Outcome: Ongoing, Progressing  Flowsheets (Taken 2/10/2022 2346)  Anxieties, Fears or Concerns: manage pain  Individualized Care Needs: assist with ADL if needed  Patient-Specific Goals (Include Timeframe): use call bell for help while here in hospital  Goal: Absence of Hospital-Acquired Illness or Injury  Outcome: Ongoing, Progressing  Goal: Optimal Comfort and Wellbeing  Outcome: Ongoing, Progressing  Intervention: Monitor Pain and Promote Comfort  Flowsheets (Taken 2/10/2022 2346)  Pain Management Interventions:   quiet environment facilitated   relaxation techniques promoted  Intervention: Provide Person-Centered Care  Flowsheets (Taken 2/10/2022 2346)  Trust Relationship/Rapport: care explained  Goal: Readiness for Transition of Care  Outcome: Ongoing, Progressing     Problem: Skin Injury Risk Increased  Goal: Skin Health and Integrity  Outcome: Ongoing, Progressing     Problem: Fall Injury Risk  Goal: Absence of Fall and Fall-Related Injury  Outcome: Ongoing, Progressing  Intervention: Identify and Manage Contributors  Flowsheets (Taken 2/10/2022 2346)  Self-Care Promotion: independence encouraged  Medication Review/Management: medications reviewed

## 2022-02-11 NOTE — NURSING
At 1050 I called [facility] to see if they will be by to see the pt and that Dr. Florez came by and said the pt could go home today, they will put in recommendations for pt.

## 2022-02-11 NOTE — SUBJECTIVE & OBJECTIVE
Interval History:     Review of Systems   Constitutional: Negative for appetite change, fatigue and fever.   HENT: Negative for congestion, hearing loss and trouble swallowing.    Respiratory: Negative for chest tightness, shortness of breath and wheezing.    Cardiovascular: Negative for chest pain and palpitations.   Gastrointestinal: Positive for abdominal pain. Negative for constipation and nausea.        Sore with recent surgery    Genitourinary: Negative for difficulty urinating and dysuria.   Musculoskeletal: Negative for back pain and neck stiffness.   Skin: Negative for pallor and rash.   Neurological: Negative for dizziness, speech difficulty and headaches.   Psychiatric/Behavioral: Negative for confusion and suicidal ideas.     Objective:     Vital Signs (Most Recent):  Temp: 98.2 °F (36.8 °C) (02/10/22 2248)  Pulse: 95 (02/10/22 2248)  Resp: (!) 21 (02/10/22 2248)  BP: 114/70 (02/10/22 2248)  SpO2: (!) 92 % (02/10/22 2248) Vital Signs (24h Range):  Temp:  [98.2 °F (36.8 °C)-99.1 °F (37.3 °C)] 98.2 °F (36.8 °C)  Pulse:  [] 95  Resp:  [16-21] 21  SpO2:  [92 %-100 %] 92 %  BP: (112-140)/(66-81) 114/70     Weight: 73.9 kg (163 lb)  Body mass index is 29.81 kg/m².    Intake/Output Summary (Last 24 hours) at 2/11/2022 0125  Last data filed at 2/10/2022 1959  Gross per 24 hour   Intake 450 ml   Output 720 ml   Net -270 ml      Physical Exam  Vitals reviewed.   Constitutional:       General: She is not in acute distress.  Eyes:      Pupils: Pupils are equal, round, and reactive to light.   Cardiovascular:      Rate and Rhythm: Normal rate and regular rhythm.      Pulses: Normal pulses.   Pulmonary:      Effort: Pulmonary effort is normal. No respiratory distress.      Breath sounds: Normal breath sounds. No wheezing.   Abdominal:      General: Bowel sounds are normal. There is no distension.      Palpations: Abdomen is soft.      Tenderness: There is abdominal tenderness. There is no guarding.   Skin:      General: Skin is warm.   Neurological:      General: No focal deficit present.      Mental Status: She is alert, oriented to person, place, and time and easily aroused. Mental status is at baseline.   Psychiatric:         Mood and Affect: Mood normal.         Behavior: Behavior normal.         Significant Labs:   All pertinent labs within the past 24 hours have been reviewed.  BMP:   Recent Labs   Lab 02/10/22  0449   *      K 3.6      CO2 25   BUN 11   CREATININE 1.09*   CALCIUM 7.6*     CBC:   Recent Labs   Lab 02/09/22  0502 02/10/22  0449   WBC 8.51 7.69   HGB 9.1* 9.0*   HCT 27.0* 26.1*    236     CMP:   Recent Labs   Lab 02/09/22  0502 02/10/22  0449   * 139   K 3.9 3.6    103   CO2 21 25   * 123*   BUN 17 11   CREATININE 1.51* 1.09*   CALCIUM 7.1* 7.6*   PROT 5.1* 5.3*   ALBUMIN 2.2* 2.2*   BILITOT 1.0 0.8   ALKPHOS 230* 214*   AST 55* 27   ALT 41 27   ANIONGAP 14 15   EGFRNONAA 37* 54*       Intake/Output - Last 3 Shifts       02/09 0700  02/10 0659 02/10 0700 02/11 0659    I.V. (mL/kg) 300 (4.1) 350 (4.7)    IV Piggyback  100    Total Intake(mL/kg) 300 (4.1) 450 (6.1)    Urine (mL/kg/hr) 800 (0.5) 700 (0.4)    Drains  20    Total Output 800 720    Net -500 -270          Urine Occurrence  1 x    Stool Occurrence  1 x        Microbiology Results (last 7 days)     ** No results found for the last 168 hours. **        Imaging Results    None

## 2022-02-11 NOTE — PROGRESS NOTES
"[facility] Medicine  Progress Note    Patient Name: Radha Crockett  MRN: [id number]  Patient Class: OP- Observation   Admission Date: 2/8/2022  Length of Stay: 0 days  Attending Physician: Steve Florez MD  Primary Care Provider: Eric Benavides DO        Subjective:     Principal Problem:Calculus of gallbladder without cholecystitis without obstruction        HPI:  Patient is a 61yo female who presented to [facility] ED with abdominal pain, subsequently found to have a calculus of the gallbladder without cholecystitis. Patient had a subtotal cholecystectomy on 2/08. After surgery, patient was experiencing some shortness of breath with chest discomfort. Cardiology was consulted to evaluate the patient. Her EKG showed sinus rhythm with diffuse ST elevations without reciprocal changes, and her troponin was elevated. A heparin infusion was started per PE protocol, pending CTA results. Her echo revealed normal systolic function, EF 70%, no wall motion abnormality, moderate right atrial and right ventricular enlargement. The CT showed no evidence of PE. However, it did show bibasilar atelectasis with a possible developing right basilar pneumonia. Internal medicine was consulted for further evaluation.    The patient states that her pain is located on the far left side of her chest, just medial to her shoulder, and around the left scapular border. She states this has been present since she woke up from surgery and believes it is due to the air used during the laparoscopic procedure. She describes it as "pressure" that has been constant, but not severe. She has some associated shortness of breath, but denies other symptoms. Her pain is not related to position or movement, and there are no aggravating factors.       Overview/Hospital Course:  Patient arrived at the ED on 2/08 complaining of abdominal pain and was found to have a calculus of the gallbladder without cholecystitis. Patient was admitted for " surgery and received a subtotal cholecystectomy on the same day by Dr. Florez. Following her surgery, she developed some shortness of breath and chest discomfort. She was evaluated by cardiology on 2/09 and started on a heparin infusion for possible PE pending CTA chest results, which showed no evidence of a PE. Inpatient medicine was then consulted for further workup.    02/10 - Prior episode SOB after surgery she feels from deep sedation after surgery. Says usually no CP or SOB. Has had neg CTA chest and negative LHC. In room with . Pt hx large snorer and feels non rested from sleep. No prior sleep study but with above finding and with abnoral echo would recommond outpt sleep study. Follow       Interval History:     Review of Systems   Constitutional: Negative for appetite change, fatigue and fever.   HENT: Negative for congestion, hearing loss and trouble swallowing.    Respiratory: Negative for chest tightness, shortness of breath and wheezing.    Cardiovascular: Negative for chest pain and palpitations.   Gastrointestinal: Positive for abdominal pain. Negative for constipation and nausea.        Sore with recent surgery    Genitourinary: Negative for difficulty urinating and dysuria.   Musculoskeletal: Negative for back pain and neck stiffness.   Skin: Negative for pallor and rash.   Neurological: Negative for dizziness, speech difficulty and headaches.   Psychiatric/Behavioral: Negative for confusion and suicidal ideas.     Objective:     Vital Signs (Most Recent):  Temp: 98.2 °F (36.8 °C) (02/10/22 2248)  Pulse: 95 (02/10/22 2248)  Resp: (!) 21 (02/10/22 2248)  BP: 114/70 (02/10/22 2248)  SpO2: (!) 92 % (02/10/22 2248) Vital Signs (24h Range):  Temp:  [98.2 °F (36.8 °C)-99.1 °F (37.3 °C)] 98.2 °F (36.8 °C)  Pulse:  [] 95  Resp:  [16-21] 21  SpO2:  [92 %-100 %] 92 %  BP: (112-140)/(66-81) 114/70     Weight: 73.9 kg (163 lb)  Body mass index is 29.81 kg/m².    Intake/Output Summary (Last 24 hours)  at 2/11/2022 0125  Last data filed at 2/10/2022 1959  Gross per 24 hour   Intake 450 ml   Output 720 ml   Net -270 ml      Physical Exam  Vitals reviewed.   Constitutional:       General: She is not in acute distress.  Eyes:      Pupils: Pupils are equal, round, and reactive to light.   Cardiovascular:      Rate and Rhythm: Normal rate and regular rhythm.      Pulses: Normal pulses.   Pulmonary:      Effort: Pulmonary effort is normal. No respiratory distress.      Breath sounds: Normal breath sounds. No wheezing.   Abdominal:      General: Bowel sounds are normal. There is no distension.      Palpations: Abdomen is soft.      Tenderness: There is abdominal tenderness. There is no guarding.   Skin:     General: Skin is warm.   Neurological:      General: No focal deficit present.      Mental Status: She is alert, oriented to person, place, and time and easily aroused. Mental status is at baseline.   Psychiatric:         Mood and Affect: Mood normal.         Behavior: Behavior normal.         Significant Labs:   All pertinent labs within the past 24 hours have been reviewed.  BMP:   Recent Labs   Lab 02/10/22  0449   *      K 3.6      CO2 25   BUN 11   CREATININE 1.09*   CALCIUM 7.6*     CBC:   Recent Labs   Lab 02/09/22  0502 02/10/22  0449   WBC 8.51 7.69   HGB 9.1* 9.0*   HCT 27.0* 26.1*    236     CMP:   Recent Labs   Lab 02/09/22  0502 02/10/22  0449   * 139   K 3.9 3.6    103   CO2 21 25   * 123*   BUN 17 11   CREATININE 1.51* 1.09*   CALCIUM 7.1* 7.6*   PROT 5.1* 5.3*   ALBUMIN 2.2* 2.2*   BILITOT 1.0 0.8   ALKPHOS 230* 214*   AST 55* 27   ALT 41 27   ANIONGAP 14 15   EGFRNONAA 37* 54*       Intake/Output - Last 3 Shifts       02/09 0700  02/10 0659 02/10 0700  02/11 0659    I.V. (mL/kg) 300 (4.1) 350 (4.7)    IV Piggyback  100    Total Intake(mL/kg) 300 (4.1) 450 (6.1)    Urine (mL/kg/hr) 800 (0.5) 700 (0.4)    Drains  20    Total Output 800 720    Net -500 -986           Urine Occurrence  1 x    Stool Occurrence  1 x        Microbiology Results (last 7 days)     ** No results found for the last 168 hours. **        Imaging Results    None             Assessment/Plan:      * Calculus of gallbladder without cholecystitis without obstruction  - s/p subtotal cholecystectomy with Dr. Florez    Pulmonary HTN    Get outpt sleep study    Elevated troponin  - Troponin elevations noted postop with initial 152 and most recent 603  - EKG with nonspecific ST changes  - Patient had C 9/21 with clean coronary arteries  - Cardiology following patient, aware of increased troponin and do not believe acute ischemia is likely cause at this time    Hypoxemia requiring supplemental oxygen  - Given tachycardia and O2 requirement, workup for PE performed  - CTA chest negative for PE and BLE US negative for DVT  - Patient placed on heparin when PE initially suspected, given negative workup will discontinue  - Likely due to decreased respiratory effort 2/2 pain    Chest pain  - Patient does complain of pain but it is localized to the L side and extending into her shoulder and L scapular region  - Consistent with postop pain 2/2 diaphragm irritation from insufflation, noted by primary surgical team  - Evaluation for life threatening causes of chest pain including ACS and PE has been performed and deemed unlikely to cause this pain      VTE Risk Mitigation (From admission, onward)         Ordered     IP VTE LOW RISK PATIENT  Once         02/08/22 1009     Place sequential compression device  Until discontinued         02/08/22 1009                Discharge Planning   KAYLEN:      Code Status: Full Code   Is the patient medically ready for discharge?:     Reason for patient still in hospital (select all that apply): Patient trending condition, Laboratory test and Treatment                     Mark Gonzalez MD  Department of Hospital Medicine   [facility] - Orth[facility]

## 2022-02-18 ENCOUNTER — OFFICE VISIT (OUTPATIENT)
Dept: SURGERY | Facility: CLINIC | Age: 62
End: 2022-02-18
Attending: SURGERY
Payer: COMMERCIAL

## 2022-02-18 DIAGNOSIS — K80.20 CALCULUS OF GALLBLADDER WITHOUT CHOLECYSTITIS WITHOUT OBSTRUCTION: Primary | ICD-10-CM

## 2022-02-18 DIAGNOSIS — Z09 POSTOP CHECK: ICD-10-CM

## 2022-02-18 PROCEDURE — 1159F PR MEDICATION LIST DOCUMENTED IN MEDICAL RECORD: ICD-10-PCS | Mod: ,,, | Performed by: SURGERY

## 2022-02-18 PROCEDURE — 99024 POSTOP FOLLOW-UP VISIT: CPT | Mod: ,,, | Performed by: SURGERY

## 2022-02-18 PROCEDURE — 99213 OFFICE O/P EST LOW 20 MIN: CPT | Mod: PBBFAC | Performed by: SURGERY

## 2022-02-18 PROCEDURE — 1159F MED LIST DOCD IN RCRD: CPT | Mod: ,,, | Performed by: SURGERY

## 2022-02-18 PROCEDURE — 99024 PR POST-OP FOLLOW-UP VISIT: ICD-10-PCS | Mod: ,,, | Performed by: SURGERY

## 2022-02-18 NOTE — PROGRESS NOTES
Patient returns for postop visit after lap casie (subtotal). There was concern for possible retained stones on IOC.    Patient reports doing very well and denies any problems with incisions.  She denies jaundice. Patient denies problems with bowel movements.    On exam, incisions have healed nicely.  There is no abdominal tenderness.  Drain removed today.     Assessment:  Doing well postop     Plan:   Check LFT today and consider ERCP if bili increased. May recheck in a few weeks if AP elevated    follow up p.r.n. for any problems

## 2022-04-11 ENCOUNTER — OFFICE VISIT (OUTPATIENT)
Dept: SLEEP MEDICINE | Facility: CLINIC | Age: 62
End: 2022-04-11
Attending: FAMILY MEDICINE
Payer: COMMERCIAL

## 2022-04-11 VITALS
WEIGHT: 167.81 LBS | HEIGHT: 63 IN | DIASTOLIC BLOOD PRESSURE: 71 MMHG | OXYGEN SATURATION: 94 % | HEART RATE: 68 BPM | BODY MASS INDEX: 29.73 KG/M2 | SYSTOLIC BLOOD PRESSURE: 122 MMHG

## 2022-04-11 DIAGNOSIS — G47.19 OTHER HYPERSOMNIA: Primary | ICD-10-CM

## 2022-04-11 DIAGNOSIS — G47.8 OTHER SLEEP DISORDERS: ICD-10-CM

## 2022-04-11 PROCEDURE — 3008F PR BODY MASS INDEX (BMI) DOCUMENTED: ICD-10-PCS | Mod: ,,, | Performed by: FAMILY MEDICINE

## 2022-04-11 PROCEDURE — 99203 PR OFFICE/OUTPT VISIT, NEW, LEVL III, 30-44 MIN: ICD-10-PCS | Mod: S$PBB,,, | Performed by: FAMILY MEDICINE

## 2022-04-11 PROCEDURE — 99215 OFFICE O/P EST HI 40 MIN: CPT | Mod: PBBFAC | Performed by: FAMILY MEDICINE

## 2022-04-11 PROCEDURE — 1159F MED LIST DOCD IN RCRD: CPT | Mod: ,,, | Performed by: FAMILY MEDICINE

## 2022-04-11 PROCEDURE — 3078F DIAST BP <80 MM HG: CPT | Mod: ,,, | Performed by: FAMILY MEDICINE

## 2022-04-11 PROCEDURE — 3008F BODY MASS INDEX DOCD: CPT | Mod: ,,, | Performed by: FAMILY MEDICINE

## 2022-04-11 PROCEDURE — 3074F PR MOST RECENT SYSTOLIC BLOOD PRESSURE < 130 MM HG: ICD-10-PCS | Mod: ,,, | Performed by: FAMILY MEDICINE

## 2022-04-11 PROCEDURE — 99203 OFFICE O/P NEW LOW 30 MIN: CPT | Mod: S$PBB,,, | Performed by: FAMILY MEDICINE

## 2022-04-11 PROCEDURE — 1159F PR MEDICATION LIST DOCUMENTED IN MEDICAL RECORD: ICD-10-PCS | Mod: ,,, | Performed by: FAMILY MEDICINE

## 2022-04-11 PROCEDURE — 3078F PR MOST RECENT DIASTOLIC BLOOD PRESSURE < 80 MM HG: ICD-10-PCS | Mod: ,,, | Performed by: FAMILY MEDICINE

## 2022-04-11 PROCEDURE — 1160F PR REVIEW ALL MEDS BY PRESCRIBER/CLIN PHARMACIST DOCUMENTED: ICD-10-PCS | Mod: ,,, | Performed by: FAMILY MEDICINE

## 2022-04-11 PROCEDURE — 1160F RVW MEDS BY RX/DR IN RCRD: CPT | Mod: ,,, | Performed by: FAMILY MEDICINE

## 2022-04-11 PROCEDURE — 3074F SYST BP LT 130 MM HG: CPT | Mod: ,,, | Performed by: FAMILY MEDICINE

## 2022-04-11 NOTE — PROGRESS NOTES
Patient presents to sleep clinic for initial evaluation.  Patient complains of loud snoring, wakes herself up snoring. Tosses and turns a lot.  Restless legs. Patient complains of constant fatigue. Denies accident or near accident due to drowsy driving.  ESS is 6.

## 2022-04-11 NOTE — PROGRESS NOTES
Subjective:       Patient ID: Radha Crews is a 62 y.o. female.    Chief Complaint: Snoring and Fatigue    Patient is seen in sleep clinic with a complaint of excessive daytime fatigue and sleepiness along with loud snoring that she has had for the last 2-5 years.  The patient had her gallbladder removed recently and had developed some respiratory issues which concerned the physician's and they recommended a sleep evaluation.  It appears that the patient had a diagnosis of pulmonary hypertension during that hospital stay.  Patient is an  of [employer] and is usually in bed by 9:00 p.m. having difficulty going to sleep and up for the day at 5:00 a.m. waking unrefreshed.  The patient states that she will be up at least 4 times a night to urinate.  Usually will have no trouble going back to sleep.  Patient does occasionally have night sweats and does take naps which are not refreshing.    Review of Systems   Constitutional: Positive for fatigue.   Respiratory: Negative for shortness of breath and wheezing.    Neurological: Negative for headaches and memory loss.   Psychiatric/Behavioral: Positive for sleep disturbance.   Denies restless legs, cataplexy, sleep paralysis, hypnagogic or hypnopompic hallucinations.  Admits to occasional night sweats and difficulty with initiating and maintaining sleep.      Objective:      Physical Exam  Vitals reviewed.   Constitutional:       General: She is not in acute distress.  HENT:      Head: Normocephalic.      Nose: Nose normal. No septal deviation.      Mouth/Throat:      Tongue: No lesions. Tongue does not deviate from midline.      Pharynx: Uvula midline.      Comments: Mallampati Class IV  Macroglossia, No Micrognathia and No Retrognathia observed.  Mild overbite noted.  Neck:      Thyroid: No thyroid mass, thyromegaly or thyroid tenderness.      Vascular: No carotid bruit.      Comments: Thyroid midline without masses or enlargement.  Cardiovascular:       Rate and Rhythm: Normal rate and regular rhythm.      Heart sounds: Normal heart sounds. No murmur heard.    No friction rub. No gallop.   Pulmonary:      Effort: Pulmonary effort is normal.      Breath sounds: Normal breath sounds.   Musculoskeletal:      Cervical back: Neck supple.      Right lower leg: No edema.      Left lower leg: No edema.   Lymphadenopathy:      Cervical:      Right cervical: No superficial or posterior cervical adenopathy.     Left cervical: No superficial or posterior cervical adenopathy.   Skin:     General: Skin is warm and dry.   Neurological:      Mental Status: She is alert and oriented to person, place, and time.   Psychiatric:         Attention and Perception: Attention normal.         Mood and Affect: Mood and affect normal.         Speech: Speech normal.         Behavior: Behavior normal. Behavior is cooperative.         Assessment:       Problem List Items Addressed This Visit    None     Visit Diagnoses     Other hypersomnia    -  Primary    Other sleep disorders              Plan:       1. Polysomnography with a sleep aid  2. Caution while operating machinery  3. Avoid evening sedatives, hypnotics and alcoholic beverages.

## 2022-05-18 ENCOUNTER — OFFICE VISIT (OUTPATIENT)
Dept: FAMILY MEDICINE | Facility: CLINIC | Age: 62
End: 2022-05-18
Payer: COMMERCIAL

## 2022-05-18 VITALS
RESPIRATION RATE: 18 BRPM | HEART RATE: 66 BPM | DIASTOLIC BLOOD PRESSURE: 84 MMHG | OXYGEN SATURATION: 98 % | BODY MASS INDEX: 30.23 KG/M2 | WEIGHT: 170.63 LBS | HEIGHT: 63 IN | TEMPERATURE: 98 F | SYSTOLIC BLOOD PRESSURE: 114 MMHG

## 2022-05-18 DIAGNOSIS — G43.909 MIGRAINE WITHOUT STATUS MIGRAINOSUS, NOT INTRACTABLE, UNSPECIFIED MIGRAINE TYPE: ICD-10-CM

## 2022-05-18 DIAGNOSIS — E78.5 HYPERLIPIDEMIA, UNSPECIFIED HYPERLIPIDEMIA TYPE: ICD-10-CM

## 2022-05-18 DIAGNOSIS — K80.20 CALCULUS OF GALLBLADDER WITHOUT CHOLECYSTITIS WITHOUT OBSTRUCTION: ICD-10-CM

## 2022-05-18 DIAGNOSIS — K21.9 GASTROESOPHAGEAL REFLUX DISEASE WITHOUT ESOPHAGITIS: ICD-10-CM

## 2022-05-18 DIAGNOSIS — M50.30 DEGENERATIVE DISC DISEASE, CERVICAL: ICD-10-CM

## 2022-05-18 DIAGNOSIS — Z12.39 ENCOUNTER FOR SCREENING FOR MALIGNANT NEOPLASM OF BREAST, UNSPECIFIED SCREENING MODALITY: ICD-10-CM

## 2022-05-18 DIAGNOSIS — F41.9 ANXIETY: ICD-10-CM

## 2022-05-18 DIAGNOSIS — J30.1 SEASONAL ALLERGIC RHINITIS DUE TO POLLEN: ICD-10-CM

## 2022-05-18 DIAGNOSIS — R03.0 ELEVATED BLOOD PRESSURE READING IN OFFICE WITHOUT DIAGNOSIS OF HYPERTENSION: ICD-10-CM

## 2022-05-18 DIAGNOSIS — I10 HYPERTENSION, UNSPECIFIED TYPE: ICD-10-CM

## 2022-05-18 DIAGNOSIS — R06.02 SOB (SHORTNESS OF BREATH): Primary | ICD-10-CM

## 2022-05-18 DIAGNOSIS — G47.00 INSOMNIA, UNSPECIFIED TYPE: ICD-10-CM

## 2022-05-18 PROCEDURE — 3074F SYST BP LT 130 MM HG: CPT | Mod: ,,, | Performed by: FAMILY MEDICINE

## 2022-05-18 PROCEDURE — 1159F PR MEDICATION LIST DOCUMENTED IN MEDICAL RECORD: ICD-10-PCS | Mod: ,,, | Performed by: FAMILY MEDICINE

## 2022-05-18 PROCEDURE — 1159F MED LIST DOCD IN RCRD: CPT | Mod: ,,, | Performed by: FAMILY MEDICINE

## 2022-05-18 PROCEDURE — 99215 PR OFFICE/OUTPT VISIT, EST, LEVL V, 40-54 MIN: ICD-10-PCS | Mod: ,,, | Performed by: FAMILY MEDICINE

## 2022-05-18 PROCEDURE — 3008F BODY MASS INDEX DOCD: CPT | Mod: ,,, | Performed by: FAMILY MEDICINE

## 2022-05-18 PROCEDURE — 3079F PR MOST RECENT DIASTOLIC BLOOD PRESSURE 80-89 MM HG: ICD-10-PCS | Mod: ,,, | Performed by: FAMILY MEDICINE

## 2022-05-18 PROCEDURE — 3074F PR MOST RECENT SYSTOLIC BLOOD PRESSURE < 130 MM HG: ICD-10-PCS | Mod: ,,, | Performed by: FAMILY MEDICINE

## 2022-05-18 PROCEDURE — 3079F DIAST BP 80-89 MM HG: CPT | Mod: ,,, | Performed by: FAMILY MEDICINE

## 2022-05-18 PROCEDURE — 3008F PR BODY MASS INDEX (BMI) DOCUMENTED: ICD-10-PCS | Mod: ,,, | Performed by: FAMILY MEDICINE

## 2022-05-18 PROCEDURE — 99215 OFFICE O/P EST HI 40 MIN: CPT | Mod: ,,, | Performed by: FAMILY MEDICINE

## 2022-05-18 RX ORDER — SUMATRIPTAN SUCCINATE 100 MG/1
100 TABLET ORAL
Qty: 9 TABLET | Refills: 5 | Status: SHIPPED | OUTPATIENT
Start: 2022-05-18

## 2022-05-18 RX ORDER — ESCITALOPRAM OXALATE 10 MG/1
10 TABLET ORAL DAILY
Qty: 30 TABLET | Refills: 5 | Status: SHIPPED | OUTPATIENT
Start: 2022-05-18

## 2022-05-18 RX ORDER — CELECOXIB 200 MG/1
200 CAPSULE ORAL DAILY
Qty: 30 CAPSULE | Refills: 5 | Status: SHIPPED | OUTPATIENT
Start: 2022-05-18 | End: 2024-01-19 | Stop reason: SDUPTHER

## 2022-05-18 RX ORDER — CETIRIZINE HYDROCHLORIDE 10 MG/1
10 TABLET ORAL DAILY
Qty: 30 TABLET | Refills: 5 | Status: SHIPPED | OUTPATIENT
Start: 2022-05-18 | End: 2024-04-02

## 2022-05-18 RX ORDER — FLUTICASONE PROPIONATE 50 MCG
1 SPRAY, SUSPENSION (ML) NASAL 2 TIMES DAILY
Qty: 15 ML | Refills: 5 | Status: SHIPPED | OUTPATIENT
Start: 2022-05-18

## 2022-05-18 RX ORDER — BUDESONIDE, GLYCOPYRROLATE, AND FORMOTEROL FUMARATE 160; 9; 4.8 UG/1; UG/1; UG/1
2 AEROSOL, METERED RESPIRATORY (INHALATION) DAILY
Qty: 10.7 G | Refills: 5 | Status: SHIPPED | OUTPATIENT
Start: 2022-05-18

## 2022-05-18 RX ORDER — HYDROXYZINE PAMOATE 25 MG/1
25 CAPSULE ORAL 2 TIMES DAILY PRN
Qty: 60 CAPSULE | Refills: 5 | Status: SHIPPED | OUTPATIENT
Start: 2022-05-18 | End: 2024-04-02

## 2022-05-18 RX ORDER — OMEPRAZOLE 20 MG/1
20 CAPSULE, DELAYED RELEASE ORAL DAILY
Qty: 30 CAPSULE | Refills: 5 | Status: SHIPPED | OUTPATIENT
Start: 2022-05-18

## 2022-05-18 RX ORDER — TIZANIDINE 4 MG/1
4 TABLET ORAL NIGHTLY
Qty: 30 TABLET | Refills: 5 | Status: SHIPPED | OUTPATIENT
Start: 2022-05-18

## 2022-05-19 LAB
ALBUMIN SERPL BCP-MCNC: 3.9 G/DL (ref 3.5–5)
ALBUMIN/GLOB SERPL: 1.4 {RATIO}
ALP SERPL-CCNC: 129 U/L (ref 50–130)
ALT SERPL W P-5'-P-CCNC: 32 U/L (ref 13–56)
ANION GAP SERPL CALCULATED.3IONS-SCNC: 13 MMOL/L (ref 7–16)
AST SERPL W P-5'-P-CCNC: 22 U/L (ref 15–37)
BASOPHILS # BLD AUTO: 0.03 K/UL (ref 0–0.2)
BASOPHILS NFR BLD AUTO: 0.5 % (ref 0–1)
BILIRUB SERPL-MCNC: 0.3 MG/DL (ref 0–1.2)
BUN SERPL-MCNC: 17 MG/DL (ref 7–18)
BUN/CREAT SERPL: 17 (ref 6–20)
CALCIUM SERPL-MCNC: 8.9 MG/DL (ref 8.5–10.1)
CHLORIDE SERPL-SCNC: 105 MMOL/L (ref 98–107)
CHOLEST SERPL-MCNC: 156 MG/DL (ref 0–200)
CHOLEST/HDLC SERPL: 2.5 {RATIO}
CO2 SERPL-SCNC: 26 MMOL/L (ref 21–32)
CREAT SERPL-MCNC: 0.98 MG/DL (ref 0.55–1.02)
DIFFERENTIAL METHOD BLD: ABNORMAL
EOSINOPHIL # BLD AUTO: 0.23 K/UL (ref 0–0.5)
EOSINOPHIL NFR BLD AUTO: 4 % (ref 1–4)
ERYTHROCYTE [DISTWIDTH] IN BLOOD BY AUTOMATED COUNT: 15.3 % (ref 11.5–14.5)
GLOBULIN SER-MCNC: 2.7 G/DL (ref 2–4)
GLUCOSE SERPL-MCNC: 60 MG/DL (ref 74–106)
HCT VFR BLD AUTO: 41.5 % (ref 38–47)
HDLC SERPL-MCNC: 62 MG/DL (ref 40–60)
HGB BLD-MCNC: 12.6 G/DL (ref 12–16)
IMM GRANULOCYTES # BLD AUTO: 0.01 K/UL (ref 0–0.04)
IMM GRANULOCYTES NFR BLD: 0.2 % (ref 0–0.4)
LDLC SERPL CALC-MCNC: 73 MG/DL
LDLC/HDLC SERPL: 1.2 {RATIO}
LYMPHOCYTES # BLD AUTO: 1.26 K/UL (ref 1–4.8)
LYMPHOCYTES NFR BLD AUTO: 22 % (ref 27–41)
MCH RBC QN AUTO: 28.3 PG (ref 27–31)
MCHC RBC AUTO-ENTMCNC: 30.4 G/DL (ref 32–36)
MCV RBC AUTO: 93.3 FL (ref 80–96)
MONOCYTES # BLD AUTO: 0.47 K/UL (ref 0–0.8)
MONOCYTES NFR BLD AUTO: 8.2 % (ref 2–6)
MPC BLD CALC-MCNC: 11.8 FL (ref 9.4–12.4)
NEUTROPHILS # BLD AUTO: 3.72 K/UL (ref 1.8–7.7)
NEUTROPHILS NFR BLD AUTO: 65.1 % (ref 53–65)
NONHDLC SERPL-MCNC: 94 MG/DL
NRBC # BLD AUTO: 0 X10E3/UL
NRBC, AUTO (.00): 0 %
PLATELET # BLD AUTO: 206 K/UL (ref 150–400)
POTASSIUM SERPL-SCNC: 4.2 MMOL/L (ref 3.5–5.1)
PROT SERPL-MCNC: 6.6 G/DL (ref 6.4–8.2)
RBC # BLD AUTO: 4.45 M/UL (ref 4.2–5.4)
SODIUM SERPL-SCNC: 140 MMOL/L (ref 136–145)
TRIGL SERPL-MCNC: 103 MG/DL (ref 35–150)
VLDLC SERPL-MCNC: 21 MG/DL
WBC # BLD AUTO: 5.72 K/UL (ref 4.5–11)

## 2022-05-19 PROCEDURE — 80053 COMPREHEN METABOLIC PANEL: CPT | Mod: ,,, | Performed by: CLINICAL MEDICAL LABORATORY

## 2022-05-19 PROCEDURE — 80061 LIPID PANEL: CPT | Mod: ,,, | Performed by: CLINICAL MEDICAL LABORATORY

## 2022-05-19 PROCEDURE — 85025 COMPLETE CBC W/AUTO DIFF WBC: CPT | Mod: ,,, | Performed by: CLINICAL MEDICAL LABORATORY

## 2022-05-19 PROCEDURE — 85025 CBC WITH DIFFERENTIAL: ICD-10-PCS | Mod: ,,, | Performed by: CLINICAL MEDICAL LABORATORY

## 2022-05-19 PROCEDURE — 80061 LIPID PANEL: ICD-10-PCS | Mod: ,,, | Performed by: CLINICAL MEDICAL LABORATORY

## 2022-05-19 PROCEDURE — 80053 COMPREHENSIVE METABOLIC PANEL: ICD-10-PCS | Mod: ,,, | Performed by: CLINICAL MEDICAL LABORATORY

## 2022-05-19 NOTE — PROGRESS NOTES
Eric Benavides DO   [address] [place]  [address]      PATIENT NAME: Radha Crews  : 1960  DATE: 22  MRN: [id number]      Billing Provider: Eric Benavides DO  Level of Service:   Patient PCP Information     Provider PCP Type    Eric Benavides DO General          Reason for Visit / Chief Complaint: Hyperlipidemia (6 month check up ) and Gastroesophageal Reflux (6 month check up )       Update PCP  Update Chief Complaint         History of Present Illness / Problem Focused Workflow     Radha Crews presents to the clinic with Hyperlipidemia (6 month check up ) and Gastroesophageal Reflux (6 month check up )     Patient has a history of hyperlipidemia and GERD and it has been well controlled.  Recently she denies any nausea vomiting or diarrhea.  She has had no abdominal pain.  Patient does have occasional shortness of breath and uses breath Street as well as albuterol inhaler.  Patient's anxiety is been well controlled recently on her medicines.  She denies any increasing depression symptoms.    Hyperlipidemia  Associated symptoms include shortness of breath. Pertinent negatives include no chest pain, leg pain or myalgias.   Gastroesophageal Reflux  She reports no abdominal pain, no chest pain, no coughing, no nausea, no sore throat or no wheezing. Pertinent negatives include no fatigue.       Review of Systems     Review of Systems   Constitutional: Negative for activity change, appetite change, chills, fatigue and fever.   HENT: Negative for nasal congestion, ear discharge, ear pain, mouth dryness, mouth sores, postnasal drip, sinus pressure/congestion, sore throat and voice change.    Eyes: Negative for pain, discharge, redness, itching and visual disturbance.   Respiratory: Positive for shortness of breath. Negative for apnea, cough, chest tightness and wheezing.    Cardiovascular: Negative for chest pain, palpitations and leg swelling.    Gastrointestinal: Negative for abdominal distention, abdominal pain, anal bleeding, blood in stool, change in bowel habit, constipation, diarrhea, nausea, vomiting, reflux and change in bowel habit.   Endocrine: Negative for cold intolerance, heat intolerance, polydipsia, polyphagia and polyuria.   Genitourinary: Negative for difficulty urinating, enuresis, frequency, genital sores, hematuria, hot flashes, menstrual irregularity, urgency and vaginal dryness.   Musculoskeletal: Negative for arthralgias, back pain, gait problem, leg pain, myalgias and neck pain.   Integumentary:  Negative for rash, mole/lesion, breast mass, breast discharge and breast tenderness.   Allergic/Immunologic: Negative for environmental allergies and food allergies.   Neurological: Negative for dizziness, vertigo, tremors, seizures, syncope, facial asymmetry, speech difficulty, weakness, light-headedness, numbness, headaches, disturbances in coordination, memory loss and coordination difficulties.   Hematological: Negative for adenopathy. Does not bruise/bleed easily.   Psychiatric/Behavioral: Negative for agitation, behavioral problems, confusion, decreased concentration, dysphoric mood, hallucinations, self-injury, sleep disturbance and suicidal ideas. The patient is nervous/anxious. The patient is not hyperactive.    Breast: Negative for mass and tenderness      Medical / Social / Family History     Past Medical History:   Diagnosis Date    Allergic rhinitis, mild     Anemia     Anxiety     Degenerative disc disease, cervical     Depression     GERD (gastroesophageal reflux disease)     Hyperlipidemia     Hypertension     Insomnia     Migraine     Vitamin D deficiency        Past Surgical History:   Procedure Laterality Date    ANGIOGRAM, CORONARY, WITH LEFT HEART CATHETERIZATION Left 9/17/2021    Procedure: Left heart cath w/ coronary angiograms;  Surgeon: Demi Carias MD;  Location: [facility] CATH LAB;  Service:  Cardiology;  Laterality: Left;    INGUINAL HERNIA REPAIR Right 1968    LAPAROSCOPIC CHOLECYSTECTOMY WITH CHOLANGIOGRAPHY N/A 2/8/2022    Procedure: CHOLECYSTECTOMY, LAPAROSCOPIC, WITH CHOLANGIOGRAM;  Surgeon: Steve Florez MD;  Location: [facility] OR;  Service: General;  Laterality: N/A;    LEFT HEART CATHETERIZATION Left 2/10/2022    Procedure: Left heart cath;  Surgeon: Francisco Rosenbaum MD;  Location: [facility] CATH LAB;  Service: Cardiology;  Laterality: Left;       Social History  Ms.  reports that she quit smoking about 12 years ago. Her smoking use included cigarettes. She has never used smokeless tobacco. She reports that she does not drink alcohol and does not use drugs.    Family History  Ms.'s family history includes Arthritis in her other; Cancer in her father; Diabetes in her other; Hypertension in her father.    Medications and Allergies     Medications  Outpatient Medications Marked as Taking for the 5/18/22 encounter (Office Visit) with Eric Benavides, DO   Medication Sig Dispense Refill    hydroCHLOROthiazide (HYDRODIURIL) 12.5 MG Tab Take 12.5 mg by mouth once daily.      iron fum-B12-IF-C-folic acid (FOLTRIN) 110-0.5 mg capsule Take 1 capsule by mouth 2 (two) times daily.      [DISCONTINUED] apixaban (ELIQUIS) 5 mg Tab Take 1 tablet (5 mg total) by mouth 2 (two) times daily. 60 tablet 3    [DISCONTINUED] budesonide-glycopyr-formoterol (BREZTRI AEROSPHERE) 160-9-4.8 mcg/actuation HFAA Inhale 2 puffs into the lungs once daily. 10.7 g 5    [DISCONTINUED] celecoxib (CELEBREX) 200 MG capsule Take 1 capsule (200 mg total) by mouth once daily. 30 capsule 5    [DISCONTINUED] cetirizine (ZYRTEC) 10 MG tablet Take 10 mg by mouth once daily.      [DISCONTINUED] EScitalopram oxalate (LEXAPRO) 10 MG tablet Take 1 tablet (10 mg total) by mouth once daily. 30 tablet 5    [DISCONTINUED] fluticasone propionate (FLONASE) 50 mcg/actuation nasal spray 1 spray by Each Nostril route 2 (two) times a day.       [DISCONTINUED] hydrOXYzine pamoate (VISTARIL) 25 MG Cap Take 1 capsule (25 mg total) by mouth 2 (two) times daily as needed (for anxiety). 60 capsule 5    [DISCONTINUED] omeprazole (PRILOSEC) 20 MG capsule Take 1 capsule (20 mg total) by mouth 2 (two) times a day. 30 capsule 5    [DISCONTINUED] sumatriptan (IMITREX) 100 MG tablet Take 1 tablet (100 mg total) by mouth every 2 (two) hours as needed for Migraine (do not take over 200 mg in 24 hrs). 1 tablet by mouth at onset of headache,may repeat once in 24 hours if no relief. 9 tablet 5    [DISCONTINUED] tiZANidine (ZANAFLEX) 4 MG tablet Take 1 tablet (4 mg total) by mouth nightly. 30 tablet 5       Allergies  Review of patient's allergies indicates:  No Known Allergies    Physical Examination     Vitals:    05/18/22 1619   BP: 114/84   Pulse: 66   Resp: 18   Temp: 98 °F (36.7 °C)     Physical Exam  Vitals reviewed.   Constitutional:       General: She is not in acute distress.     Appearance: Normal appearance. She is obese.   HENT:      Head: Normocephalic and atraumatic.      Nose: Nose normal.      Mouth/Throat:      Mouth: Mucous membranes are moist.      Pharynx: Oropharynx is clear. No oropharyngeal exudate or posterior oropharyngeal erythema.   Eyes:      General: No scleral icterus.     Extraocular Movements: Extraocular movements intact.      Conjunctiva/sclera: Conjunctivae normal.      Pupils: Pupils are equal, round, and reactive to light.   Neck:      Vascular: No carotid bruit.   Cardiovascular:      Rate and Rhythm: Normal rate and regular rhythm.      Pulses: Normal pulses.      Heart sounds: Normal heart sounds. No murmur heard.    No gallop.   Pulmonary:      Effort: Pulmonary effort is normal.      Breath sounds: Normal breath sounds. No wheezing.   Abdominal:      General: Abdomen is flat. Bowel sounds are normal.      Palpations: Abdomen is soft.      Tenderness: There is no abdominal tenderness.   Musculoskeletal:         General:  Normal range of motion.      Cervical back: Normal range of motion and neck supple.      Right lower leg: No edema.      Left lower leg: No edema.   Lymphadenopathy:      Cervical: No cervical adenopathy.   Skin:     General: Skin is warm and dry.      Capillary Refill: Capillary refill takes less than 2 seconds.      Coloration: Skin is not jaundiced.      Findings: No lesion or rash.   Neurological:      General: No focal deficit present.      Mental Status: She is alert and oriented to person, place, and time. Mental status is at baseline.      Cranial Nerves: No cranial nerve deficit.      Sensory: No sensory deficit.      Motor: No weakness.      Coordination: Coordination normal.      Gait: Gait normal.      Deep Tendon Reflexes: Reflexes normal.   Psychiatric:         Mood and Affect: Mood normal.         Behavior: Behavior normal.         Thought Content: Thought content normal.         Judgment: Judgment normal.               Lab Results   Component Value Date    WBC 7.55 02/11/2022    HGB 9.1 (L) 02/11/2022    HCT 28.2 (L) 02/11/2022    MCV 87.3 02/11/2022     02/11/2022          Sodium   Date Value Ref Range Status   02/10/2022 139 136 - 145 mmol/L Final     Potassium   Date Value Ref Range Status   02/10/2022 3.6 3.5 - 5.1 mmol/L Final     Chloride   Date Value Ref Range Status   02/10/2022 103 98 - 107 mmol/L Final     CO2   Date Value Ref Range Status   02/10/2022 25 21 - 32 mmol/L Final     Glucose   Date Value Ref Range Status   02/10/2022 123 (H) 74 - 106 mg/dL Final     BUN   Date Value Ref Range Status   02/10/2022 11 7 - 18 mg/dL Final     Creatinine   Date Value Ref Range Status   02/10/2022 1.09 (H) 0.55 - 1.02 mg/dL Final     Calcium   Date Value Ref Range Status   02/10/2022 7.6 (L) 8.5 - 10.1 mg/dL Final     Total Protein   Date Value Ref Range Status   02/18/2022 5.8 (L) 6.4 - 8.2 g/dL Final     Albumin   Date Value Ref Range Status   02/18/2022 2.6 (L) 3.5 - 5.0 g/dL Final      Bilirubin, Total   Date Value Ref Range Status   02/18/2022 0.2 0.0 - 1.2 mg/dL Final     Alk Phos   Date Value Ref Range Status   02/18/2022 237 (H) 50 - 130 U/L Final     AST   Date Value Ref Range Status   02/18/2022 33 15 - 37 U/L Final     ALT   Date Value Ref Range Status   02/18/2022 25 13 - 56 U/L Final     Anion Gap   Date Value Ref Range Status   02/10/2022 15 7 - 16 mmol/L Final     eGFR   Date Value Ref Range Status   02/10/2022 54 (L) >=60 mL/min/1.73m² Final      Cardiac catheterization  · The estimated blood loss was none.  · The coronary arteries were normal..     1. Normal coronary arteries    Plan:  1. Consider short term anticoagulation for possible small PE (poor quality   CTPE; cannot exclude small segmental PE.     The procedure log was documented by Documenter: James Garcia RT;   Camille Amos RN and verified by Francisco Rosenbaum MD.    Date: 2/10/2022  Time: 11:57 AM     Procedures   Assessment and Plan (including Health Maintenance)      Problem List  Smart Sets  Document Outside HM   :    Plan:         Health Maintenance Due   Topic Date Due    Hepatitis C Screening  Never done    Cervical Cancer Screening  Never done    COVID-19 Vaccine (1) Never done    HIV Screening  Never done    Colorectal Cancer Screening  Never done    Shingles Vaccine (1 of 2) Never done    Mammogram  10/07/2021       Problem List Items Addressed This Visit        Neuro    Degenerative disc disease, cervical    Current Assessment & Plan     History of cervical degenerative this is disease with the neuropathy.  No change in her medication i.e. will continue Celebrex and tizanidine.  Follow-up p.r.n..           Relevant Medications    celecoxib (CELEBREX) 200 MG capsule    tiZANidine (ZANAFLEX) 4 MG tablet       Psychiatric    Anxiety    Current Assessment & Plan     Patient's anxiety is controlled with hydroxyzine and Lexapro.  Will not change her medicines.  Follow-up here in 3  months.           Relevant Medications    hydrOXYzine pamoate (VISTARIL) 25 MG Cap    EScitalopram oxalate (LEXAPRO) 10 MG tablet       Pulmonary    SOB (shortness of breath) - Primary    Current Assessment & Plan     Continued shortness of breath likely COPD versus asthma.  Will continue her albuterol treatments as well as her breasts tree which have seemed to help.  Follow back here every 3 months.           Relevant Medications    budesonide-glycopyr-formoterol (BREZTRI AEROSPHERE) 160-9-4.8 mcg/actuation HFAA    cetirizine (ZYRTEC) 10 MG tablet    fluticasone propionate (FLONASE) 50 mcg/actuation nasal spray    apixaban (ELIQUIS) 5 mg Tab       Cardiac/Vascular    Elevated blood pressure reading in office without diagnosis of hypertension    Hyperlipidemia    Current Assessment & Plan     Patient's recent total cholesterol was 170 with the HDL of 63 and an LDL of 92. No change in her treatment.  Follow-up every 6 months.           Hypertension    Current Assessment & Plan     Blood pressure currently well controlled.  No change in his her medication.  Follow-up every 3 months.              GI    Calculus of gallbladder without cholecystitis without obstruction    Relevant Orders    Lipid Panel    CBC Auto Differential    Comprehensive Metabolic Panel       Other    Insomnia    Current Assessment & Plan     History of insomnia will continue her hydroxyzine to help with that.  Follow-up p.r.n.           Relevant Medications    hydrOXYzine pamoate (VISTARIL) 25 MG Cap    EScitalopram oxalate (LEXAPRO) 10 MG tablet      Other Visit Diagnoses     Migraine without status migrainosus, not intractable, unspecified migraine type        Gastroesophageal reflux disease without esophagitis        Seasonal allergic rhinitis due to pollen        Relevant Medications    cetirizine (ZYRTEC) 10 MG tablet    fluticasone propionate (FLONASE) 50 mcg/actuation nasal spray    Encounter for screening for malignant neoplasm of breast,  unspecified screening modality        Relevant Orders    Mammo Digital Screening Bilat          Health Maintenance Topics with due status: Not Due       Topic Last Completion Date    TETANUS VACCINE 07/09/2021    Lipid Panel 02/07/2022       Future Appointments   Date Time Provider Department Center   6/26/2022  7:00 PM [facility]   7/14/2022  2:15 PM Devi Zepeda MD [facility]   8/8/2022  3:30 PM Eric Benavides DO [facility]        Follow up in about 3 months (around 8/18/2022).     Signature:  DO [facility]  [address] [place] [address], [place]  [id number]    Date of encounter: 5/18/22

## 2022-05-19 NOTE — ASSESSMENT & PLAN NOTE
Patient's recent total cholesterol was 170 with the HDL of 63 and an LDL of 92. No change in her treatment.  Follow-up every 6 months.

## 2022-05-19 NOTE — ASSESSMENT & PLAN NOTE
Patient's anxiety is controlled with hydroxyzine and Lexapro.  Will not change her medicines.  Follow-up here in 3 months.

## 2022-05-19 NOTE — ASSESSMENT & PLAN NOTE
Blood pressure currently well controlled.  No change in his her medication.  Follow-up every 3 months.

## 2022-05-19 NOTE — ASSESSMENT & PLAN NOTE
Continued shortness of breath likely COPD versus asthma.  Will continue her albuterol treatments as well as her breasts tree which have seemed to help.  Follow back here every 3 months.

## 2022-05-19 NOTE — ASSESSMENT & PLAN NOTE
History of cervical degenerative this is disease with the neuropathy.  No change in her medication i.e. will continue Celebrex and tizanidine.  Follow-up p.r.n..

## 2022-05-25 ENCOUNTER — OFFICE VISIT (OUTPATIENT)
Dept: FAMILY MEDICINE | Facility: CLINIC | Age: 62
End: 2022-05-25
Payer: COMMERCIAL

## 2022-05-25 VITALS
TEMPERATURE: 98 F | RESPIRATION RATE: 20 BRPM | HEART RATE: 74 BPM | WEIGHT: 167.63 LBS | DIASTOLIC BLOOD PRESSURE: 80 MMHG | OXYGEN SATURATION: 94 % | SYSTOLIC BLOOD PRESSURE: 115 MMHG | BODY MASS INDEX: 29.7 KG/M2 | HEIGHT: 63 IN

## 2022-05-25 DIAGNOSIS — J02.9 SORE THROAT: ICD-10-CM

## 2022-05-25 DIAGNOSIS — J01.00 ACUTE NON-RECURRENT MAXILLARY SINUSITIS: Primary | ICD-10-CM

## 2022-05-25 PROBLEM — R79.89 ELEVATED TROPONIN: Status: RESOLVED | Noted: 2022-02-09 | Resolved: 2022-05-25

## 2022-05-25 PROBLEM — R11.0 NAUSEA: Status: RESOLVED | Noted: 2021-07-09 | Resolved: 2022-05-25

## 2022-05-25 PROBLEM — S69.91XA: Status: RESOLVED | Noted: 2021-07-09 | Resolved: 2022-05-25

## 2022-05-25 PROBLEM — R06.02 SOB (SHORTNESS OF BREATH): Status: RESOLVED | Noted: 2021-06-09 | Resolved: 2022-05-25

## 2022-05-25 PROBLEM — D22.9 ATYPICAL MOLE: Status: RESOLVED | Noted: 2021-06-09 | Resolved: 2022-05-25

## 2022-05-25 PROBLEM — R07.9 CHEST PAIN: Status: RESOLVED | Noted: 2021-06-25 | Resolved: 2022-05-25

## 2022-05-25 PROBLEM — R94.31 ABNORMAL EKG: Status: RESOLVED | Noted: 2022-02-09 | Resolved: 2022-05-25

## 2022-05-25 PROBLEM — Z99.81 HYPOXEMIA REQUIRING SUPPLEMENTAL OXYGEN: Status: RESOLVED | Noted: 2022-02-09 | Resolved: 2022-05-25

## 2022-05-25 PROBLEM — R10.13 EPIGASTRIC PAIN: Status: RESOLVED | Noted: 2022-02-07 | Resolved: 2022-05-25

## 2022-05-25 PROBLEM — R09.02 HYPOXEMIA REQUIRING SUPPLEMENTAL OXYGEN: Status: RESOLVED | Noted: 2022-02-09 | Resolved: 2022-05-25

## 2022-05-25 LAB
CTP QC/QA: YES
S PYO RRNA THROAT QL PROBE: NEGATIVE

## 2022-05-25 PROCEDURE — 1159F MED LIST DOCD IN RCRD: CPT | Mod: ,,, | Performed by: NURSE PRACTITIONER

## 2022-05-25 PROCEDURE — 3008F BODY MASS INDEX DOCD: CPT | Mod: ,,, | Performed by: NURSE PRACTITIONER

## 2022-05-25 PROCEDURE — 1159F PR MEDICATION LIST DOCUMENTED IN MEDICAL RECORD: ICD-10-PCS | Mod: ,,, | Performed by: NURSE PRACTITIONER

## 2022-05-25 PROCEDURE — 1160F PR REVIEW ALL MEDS BY PRESCRIBER/CLIN PHARMACIST DOCUMENTED: ICD-10-PCS | Mod: ,,, | Performed by: NURSE PRACTITIONER

## 2022-05-25 PROCEDURE — 3074F PR MOST RECENT SYSTOLIC BLOOD PRESSURE < 130 MM HG: ICD-10-PCS | Mod: ,,, | Performed by: NURSE PRACTITIONER

## 2022-05-25 PROCEDURE — 99213 PR OFFICE/OUTPT VISIT, EST, LEVL III, 20-29 MIN: ICD-10-PCS | Mod: 25,,, | Performed by: NURSE PRACTITIONER

## 2022-05-25 PROCEDURE — 3079F DIAST BP 80-89 MM HG: CPT | Mod: ,,, | Performed by: NURSE PRACTITIONER

## 2022-05-25 PROCEDURE — 87880 POCT RAPID STREP A: ICD-10-PCS | Mod: QW,,, | Performed by: NURSE PRACTITIONER

## 2022-05-25 PROCEDURE — 1160F RVW MEDS BY RX/DR IN RCRD: CPT | Mod: ,,, | Performed by: NURSE PRACTITIONER

## 2022-05-25 PROCEDURE — 99213 OFFICE O/P EST LOW 20 MIN: CPT | Mod: 25,,, | Performed by: NURSE PRACTITIONER

## 2022-05-25 PROCEDURE — 3079F PR MOST RECENT DIASTOLIC BLOOD PRESSURE 80-89 MM HG: ICD-10-PCS | Mod: ,,, | Performed by: NURSE PRACTITIONER

## 2022-05-25 PROCEDURE — 96372 PR INJECTION,THERAP/PROPH/DIAG2ST, IM OR SUBCUT: ICD-10-PCS | Mod: ,,, | Performed by: NURSE PRACTITIONER

## 2022-05-25 PROCEDURE — 3074F SYST BP LT 130 MM HG: CPT | Mod: ,,, | Performed by: NURSE PRACTITIONER

## 2022-05-25 PROCEDURE — 87880 STREP A ASSAY W/OPTIC: CPT | Mod: QW,,, | Performed by: NURSE PRACTITIONER

## 2022-05-25 PROCEDURE — 3008F PR BODY MASS INDEX (BMI) DOCUMENTED: ICD-10-PCS | Mod: ,,, | Performed by: NURSE PRACTITIONER

## 2022-05-25 PROCEDURE — 96372 THER/PROPH/DIAG INJ SC/IM: CPT | Mod: ,,, | Performed by: NURSE PRACTITIONER

## 2022-05-25 RX ORDER — CEFTRIAXONE 1 G/1
1 INJECTION, POWDER, FOR SOLUTION INTRAMUSCULAR; INTRAVENOUS
Status: COMPLETED | OUTPATIENT
Start: 2022-05-25 | End: 2022-05-25

## 2022-05-25 RX ORDER — METHYLPREDNISOLONE ACETATE 40 MG/ML
40 INJECTION, SUSPENSION INTRA-ARTICULAR; INTRALESIONAL; INTRAMUSCULAR; SOFT TISSUE
Status: COMPLETED | OUTPATIENT
Start: 2022-05-25 | End: 2022-05-25

## 2022-05-25 RX ORDER — AZITHROMYCIN 250 MG/1
TABLET, FILM COATED ORAL
Qty: 6 TABLET | Refills: 0 | Status: ON HOLD | OUTPATIENT
Start: 2022-05-25 | End: 2022-11-13 | Stop reason: HOSPADM

## 2022-05-25 RX ADMIN — METHYLPREDNISOLONE ACETATE 40 MG: 40 INJECTION, SUSPENSION INTRA-ARTICULAR; INTRALESIONAL; INTRAMUSCULAR; SOFT TISSUE at 01:05

## 2022-05-25 RX ADMIN — CEFTRIAXONE 1 G: 1 INJECTION, POWDER, FOR SOLUTION INTRAMUSCULAR; INTRAVENOUS at 01:05

## 2022-05-25 NOTE — PROGRESS NOTES
CINDY Dupree   [facility]  [address]  [place] [address]     PATIENT NAME: Radha Crews  : 1960  DATE: 22  MRN: [id number]      Billing Provider: CINDY Dupree  Level of Service: NH OFFICE/OUTPT VISIT, EST, LEVL III, 20-29 MIN  Patient PCP Information     Provider PCP Type    Eric Benavides DO General          Reason for Visit / Chief Complaint: Sore Throat (Started about dinner yesterday and is now about to loose her voice ) and Sinus Problem (Feels like her sinuses may have been draining down throat but has not had any sneezing or runny nose )       Update PCP  Update Chief Complaint         History of Present Illness / Problem Focused Workflow     Radha Crews presents to the clinic c/o sore throat, post nasal drainge and congestion since yesterday, No known fever.      Review of Systems     Review of Systems   Constitutional: Negative.  Negative for activity change, appetite change, chills and fever.   HENT: Positive for nasal congestion, postnasal drip and sore throat. Negative for ear pain and trouble swallowing.    Respiratory: Negative for shortness of breath.    Cardiovascular: Negative for chest pain.   Gastrointestinal: Negative for abdominal pain, nausea and vomiting.   Neurological: Negative for dizziness, weakness and headaches.        Medical / Social / Family History     Past Medical History:   Diagnosis Date    Allergic rhinitis, mild     Anemia     Anxiety     Degenerative disc disease, cervical     Depression     GERD (gastroesophageal reflux disease)     Hyperlipidemia     Hypertension     Insomnia     Migraine     Vitamin D deficiency        Past Surgical History:   Procedure Laterality Date    ANGIOGRAM, CORONARY, WITH LEFT HEART CATHETERIZATION Left 2021    Procedure: Left heart cath w/ coronary angiograms;  Surgeon: Demi Carias MD;  [email or web address];  Service: Cardiology;  Laterality: Left;     INGUINAL HERNIA REPAIR Right 1968    LAPAROSCOPIC CHOLECYSTECTOMY WITH CHOLANGIOGRAPHY N/A 2/8/2022    Procedure: CHOLECYSTECTOMY, LAPAROSCOPIC, WITH CHOLANGIOGRAM;  Surgeon: Steve Florez MD;  Location: [facility] OR;  Service: General;  Laterality: N/A;    LEFT HEART CATHETERIZATION Left 2/10/2022    Procedure: Left heart cath;  Surgeon: Francisco Rosenbaum MD;  Location: [facility] CATH LAB;  Service: Cardiology;  Laterality: Left;       Social History  Ms.  reports that she quit smoking about 12 years ago. Her smoking use included cigarettes. She has never used smokeless tobacco. She reports that she does not drink alcohol and does not use drugs.    Family History  Ms.'s family history includes Arthritis in her other; Cancer in her father; Diabetes in her other; Hypertension in her father.    Medications and Allergies     Medications  Outpatient Medications Marked as Taking for the 5/25/22 encounter (Office Visit) with CINDY Valle   Medication Sig Dispense Refill    albuterol (PROVENTIL/VENTOLIN HFA) 90 mcg/actuation inhaler Inhale 2 puffs into the lungs every 6 (six) hours as needed for Wheezing. Rescue      apixaban (ELIQUIS) 5 mg Tab Take 1 tablet (5 mg total) by mouth 2 (two) times daily. 60 tablet 3    budesonide-glycopyr-formoterol (BREZTRI AEROSPHERE) 160-9-4.8 mcg/actuation HFAA Inhale 2 puffs into the lungs once daily. 10.7 g 5    celecoxib (CELEBREX) 200 MG capsule Take 1 capsule (200 mg total) by mouth once daily. 30 capsule 5    EScitalopram oxalate (LEXAPRO) 10 MG tablet Take 1 tablet (10 mg total) by mouth once daily. 30 tablet 5    fluticasone propionate (FLONASE) 50 mcg/actuation nasal spray 1 spray (50 mcg total) by Each Nostril route 2 (two) times a day. 15 mL 5    hydrOXYzine pamoate (VISTARIL) 25 MG Cap Take 1 capsule (25 mg total) by mouth 2 (two) times daily as needed (for anxiety). 60 capsule 5    iron fum-B12-IF-C-folic acid (FOLTRIN) 110-0.5 mg capsule Take 1 capsule  "by mouth 2 (two) times daily.      omeprazole (PRILOSEC) 20 MG capsule Take 1 capsule (20 mg total) by mouth once daily. 30 capsule 5    sumatriptan (IMITREX) 100 MG tablet Take 1 tablet (100 mg total) by mouth every 2 (two) hours as needed for Migraine (do not take over 200 mg in 24 hrs). 1 tablet by mouth at onset of headache,may repeat once in 24 hours if no relief. 9 tablet 5    tiZANidine (ZANAFLEX) 4 MG tablet Take 1 tablet (4 mg total) by mouth nightly. 30 tablet 5     Current Facility-Administered Medications for the 5/25/22 encounter (Office Visit) with CINDY Valle   Medication Dose Route Frequency Provider Last Rate Last Admin    [COMPLETED] cefTRIAXone injection 1 g  1 g Intramuscular 1 time in Clinic/HOD CINDY Valle   1 g at 05/25/22 1346    [COMPLETED] methylPREDNISolone acetate injection 40 mg  40 mg Intramuscular 1 time in Clinic/HOD CINDY Valle   40 mg at 05/25/22 1346       Allergies  Review of patient's allergies indicates:  No Known Allergies    Physical Examination     Vitals:    05/25/22 1317   BP: 115/80   BP Location: Right arm   Patient Position: Sitting   BP Method: Medium (Manual)   Pulse: 74   Resp: 20   Temp: 97.8 °F (36.6 °C)   TempSrc: Oral   SpO2: (!) 94%   Weight: 76 kg (167 lb 9.6 oz)   Height: 5' 3" (1.6 m)      Physical Exam  Constitutional:       Appearance: Normal appearance.   HENT:      Head: Normocephalic.      Right Ear: Hearing and ear canal normal. No tenderness. Tympanic membrane is not injected.      Left Ear: Hearing and ear canal normal. No tenderness. Tympanic membrane is not injected.      Nose: Congestion and rhinorrhea present. No nasal deformity. Rhinorrhea is clear.      Right Turbinates: Not swollen.      Left Turbinates: Not swollen.      Right Sinus: Maxillary sinus tenderness present.      Left Sinus: Maxillary sinus tenderness present.      Mouth/Throat:      Lips: Pink.      Mouth: Mucous membranes are moist.      " Pharynx: No oropharyngeal exudate or posterior oropharyngeal erythema.      Tonsils: No tonsillar exudate.      Comments: Cobblestoning posterior pharynx L>R  Eyes:      General: Lids are normal. No allergic shiner.        Right eye: No discharge.         Left eye: No discharge.      Conjunctiva/sclera:      Right eye: Right conjunctiva is not injected.      Left eye: Left conjunctiva is not injected.   Neck:      Trachea: Trachea normal.   Cardiovascular:      Rate and Rhythm: Normal rate and regular rhythm.      Pulses: Normal pulses.      Heart sounds: Normal heart sounds.   Pulmonary:      Effort: Pulmonary effort is normal.      Breath sounds: Normal breath sounds.   Abdominal:      General: Bowel sounds are normal.      Palpations: Abdomen is soft.   Musculoskeletal:      Cervical back: Neck supple.   Lymphadenopathy:      Cervical: Cervical adenopathy present.   Skin:     General: Skin is warm and dry.      Coloration: Skin is not cyanotic or pale.   Neurological:      Mental Status: She is alert and oriented to person, place, and time.          Assessment and Plan (including Health Maintenance)      Problem List  Smart Sets  Document Outside HM   :          Health Maintenance Due   Topic Date Due    COVID-19 Vaccine (1) Never done    Mammogram  10/07/2021       Problem List Items Addressed This Visit    None     Visit Diagnoses     Acute non-recurrent maxillary sinusitis    -  Primary    Rapid strep negative; will treat with rocephin and depoemedrol injection along with zithromax for 5 days. Increase fluid intake.    Relevant Medications    cefTRIAXone injection 1 g (Completed)    methylPREDNISolone acetate injection 40 mg (Completed)    azithromycin (ZITHROMAX Z-YASMEEN) 250 MG tablet    Sore throat        Relevant Orders    POCT rapid strep A (Completed)        Acute non-recurrent maxillary sinusitis  Comments:  Rapid strep negative; will treat with rocephin and depoemedrol injection along with zithromax for  5 days. Increase fluid intake.  Orders:  -     cefTRIAXone injection 1 g  -     methylPREDNISolone acetate injection 40 mg  -     azithromycin (ZITHROMAX Z-YASMEEN) 250 MG tablet; Take 2 tabs by mouth today then 1 tab daily x 4 days  Dispense: 6 tablet; Refill: 0    Sore throat  -     POCT rapid strep A       Health Maintenance Topics with due status: Not Due       Topic Last Completion Date    TETANUS VACCINE 07/09/2021    Lipid Panel 05/19/2022       Procedures     Future Appointments   Date Time Provider Department Center   6/26/2022  7:00 PM SLEEP [facility], [place] [facility]   7/14/2022  2:15 PM Devi Zepeda MD [facility] DERM [place]   8/8/2022  3:30 PM Eric Benavides DO [facility] FREEMAN [place]   8/19/2022 11:00 AM [facility] M[facility] GARYO Duane [place]        Follow up if symptoms worsen or fail to improve.     Signature:  CINDY Dupree    Date of encounter: 5/25/22

## 2022-07-28 DIAGNOSIS — G47.19 OTHER HYPERSOMNIA: Primary | ICD-10-CM

## 2022-07-28 DIAGNOSIS — G47.8 OTHER SLEEP DISORDERS: ICD-10-CM

## 2022-08-04 ENCOUNTER — PROCEDURE VISIT (OUTPATIENT)
Dept: SLEEP MEDICINE | Facility: HOSPITAL | Age: 62
End: 2022-08-04
Attending: FAMILY MEDICINE
Payer: COMMERCIAL

## 2022-08-04 DIAGNOSIS — G47.19 OTHER HYPERSOMNIA: ICD-10-CM

## 2022-08-04 DIAGNOSIS — G47.8 OTHER SLEEP DISORDERS: ICD-10-CM

## 2022-08-22 DIAGNOSIS — G47.19 OTHER HYPERSOMNIA: Primary | ICD-10-CM

## 2022-08-22 PROCEDURE — 95806 SLEEP STUDY UNATT&RESP EFFT: CPT | Mod: 26,,, | Performed by: FAMILY MEDICINE

## 2022-08-22 PROCEDURE — 95806 PR SLEEP STUDY, UNATTENDED, SIMUL RECORD HR/O2 SAT/RESP FLOW/RESP EFFT: ICD-10-PCS | Mod: 26,,, | Performed by: FAMILY MEDICINE

## 2022-10-31 ENCOUNTER — PROCEDURE VISIT (OUTPATIENT)
Dept: SLEEP MEDICINE | Facility: HOSPITAL | Age: 62
End: 2022-10-31
Attending: FAMILY MEDICINE
Payer: COMMERCIAL

## 2022-10-31 DIAGNOSIS — G47.19 OTHER HYPERSOMNIA: ICD-10-CM

## 2022-11-08 DIAGNOSIS — G47.33 OSA (OBSTRUCTIVE SLEEP APNEA): Primary | ICD-10-CM

## 2022-11-08 PROCEDURE — 95810 PR POLYSOMNOGRAPHY, 4 OR MORE: ICD-10-PCS | Mod: 26,,, | Performed by: FAMILY MEDICINE

## 2022-11-08 PROCEDURE — 95810 POLYSOM 6/> YRS 4/> PARAM: CPT | Mod: 26,,, | Performed by: FAMILY MEDICINE

## 2022-11-12 ENCOUNTER — ANESTHESIA EVENT (OUTPATIENT)
Dept: SURGERY | Facility: HOSPITAL | Age: 62
End: 2022-11-12
Payer: COMMERCIAL

## 2022-11-12 ENCOUNTER — HOSPITAL ENCOUNTER (OUTPATIENT)
Facility: HOSPITAL | Age: 62
Discharge: HOME | End: 2022-11-13
Admitting: ORTHOPAEDIC SURGERY
Payer: COMMERCIAL

## 2022-11-12 ENCOUNTER — ANESTHESIA (OUTPATIENT)
Dept: SURGERY | Facility: HOSPITAL | Age: 62
End: 2022-11-12
Payer: COMMERCIAL

## 2022-11-12 DIAGNOSIS — S62.102A CLOSED FRACTURE OF BOTH WRISTS, INITIAL ENCOUNTER: Primary | ICD-10-CM

## 2022-11-12 DIAGNOSIS — T14.90XA TRAUMA: ICD-10-CM

## 2022-11-12 DIAGNOSIS — S52.579A OTHER CLOSED INTRA-ARTICULAR FRACTURE OF DISTAL END OF RADIUS, UNSPECIFIED LATERALITY, INITIAL ENCOUNTER: ICD-10-CM

## 2022-11-12 DIAGNOSIS — S62.101A CLOSED FRACTURE OF BOTH WRISTS, INITIAL ENCOUNTER: Primary | ICD-10-CM
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
## 2022-11-12 PROBLEM — S52.561A CLOSED BARTON'S FRACTURE OF RIGHT RADIUS: Status: ACTIVE | Noted: 2022-11-12

## 2022-11-12 PROBLEM — S52.562D CLOSED BARTON'S FRACTURE OF LEFT RADIUS WITH ROUTINE HEALING: Status: ACTIVE | Noted: 2022-11-12

## 2022-11-12 LAB
ANION GAP SERPL CALCULATED.3IONS-SCNC: 16 MMOL/L (ref 7–16)
BUN SERPL-MCNC: 18 MG/DL (ref 7–18)
BUN/CREAT SERPL: 14 (ref 6–20)
CALCIUM SERPL-MCNC: 8.6 MG/DL (ref 8.5–10.1)
CHLORIDE SERPL-SCNC: 105 MMOL/L (ref 98–107)
CO2 SERPL-SCNC: 22 MMOL/L (ref 21–32)
CREAT SERPL-MCNC: 1.26 MG/DL (ref 0.55–1.02)
EGFR (NO RACE VARIABLE) (RUSH/TITUS): 49 ML/MIN/1.73M²
GLUCOSE SERPL-MCNC: 136 MG/DL (ref 70–105)
GLUCOSE SERPL-MCNC: 138 MG/DL (ref 74–106)
POTASSIUM SERPL-SCNC: 4.1 MMOL/L (ref 3.5–5.1)
SODIUM SERPL-SCNC: 139 MMOL/L (ref 136–145)

## 2022-11-12 PROCEDURE — 27000689 HC BLADE LARYNGOSCOPE ANY SIZE: Performed by: NURSE ANESTHETIST, CERTIFIED REGISTERED

## 2022-11-12 PROCEDURE — 36415 COLL VENOUS BLD VENIPUNCTURE: CPT | Performed by: NURSE PRACTITIONER

## 2022-11-12 PROCEDURE — D9220A PRA ANESTHESIA: Mod: ANES,,, | Performed by: ANESTHESIOLOGY

## 2022-11-12 PROCEDURE — D9220A PRA ANESTHESIA: ICD-10-PCS | Mod: ANES,,, | Performed by: ANESTHESIOLOGY

## 2022-11-12 PROCEDURE — 25607 OPTX DST RD XARTC FX/EPI SEP: CPT | Mod: RT,,, | Performed by: ORTHOPAEDIC SURGERY

## 2022-11-12 PROCEDURE — 27000510 HC BLANKET BAIR HUGGER ANY SIZE: Performed by: NURSE ANESTHETIST, CERTIFIED REGISTERED

## 2022-11-12 PROCEDURE — 86901 BLOOD TYPING SEROLOGIC RH(D): CPT | Performed by: NURSE PRACTITIONER

## 2022-11-12 PROCEDURE — 36000708 HC OR TIME LEV III 1ST 15 MIN: Performed by: ORTHOPAEDIC SURGERY

## 2022-11-12 PROCEDURE — 27000165 HC TUBE, ETT CUFFED: Performed by: NURSE ANESTHETIST, CERTIFIED REGISTERED

## 2022-11-12 PROCEDURE — 80048 BASIC METABOLIC PNL TOTAL CA: CPT | Performed by: NURSE PRACTITIONER

## 2022-11-12 PROCEDURE — 25000003 PHARM REV CODE 250: Performed by: ORTHOPAEDIC SURGERY

## 2022-11-12 PROCEDURE — 96375 TX/PRO/DX INJ NEW DRUG ADDON: CPT

## 2022-11-12 PROCEDURE — 37000008 HC ANESTHESIA 1ST 15 MINUTES: Performed by: ORTHOPAEDIC SURGERY

## 2022-11-12 PROCEDURE — 71000033 HC RECOVERY, INTIAL HOUR: Performed by: ORTHOPAEDIC SURGERY

## 2022-11-12 PROCEDURE — 96374 THER/PROPH/DIAG INJ IV PUSH: CPT

## 2022-11-12 PROCEDURE — 25600 CLTX DST RDL FX/EPHYS SEP WO: CPT | Mod: 59,LT,, | Performed by: ORTHOPAEDIC SURGERY

## 2022-11-12 PROCEDURE — 99285 PR EMERGENCY DEPT VISIT,LEVEL V: ICD-10-PCS | Mod: ,,, | Performed by: NURSE PRACTITIONER

## 2022-11-12 PROCEDURE — 27201423 OPTIME MED/SURG SUP & DEVICES STERILE SUPPLY: Performed by: ORTHOPAEDIC SURGERY

## 2022-11-12 PROCEDURE — 96376 TX/PRO/DX INJ SAME DRUG ADON: CPT

## 2022-11-12 PROCEDURE — 27000716 HC OXISENSOR PROBE, ANY SIZE: Performed by: NURSE ANESTHETIST, CERTIFIED REGISTERED

## 2022-11-12 PROCEDURE — D9220A PRA ANESTHESIA: Mod: CRNA,,, | Performed by: NURSE ANESTHETIST, CERTIFIED REGISTERED

## 2022-11-12 PROCEDURE — C1713 ANCHOR/SCREW BN/BN,TIS/BN: HCPCS | Performed by: ORTHOPAEDIC SURGERY

## 2022-11-12 PROCEDURE — 27000655: Performed by: NURSE ANESTHETIST, CERTIFIED REGISTERED

## 2022-11-12 PROCEDURE — D9220A PRA ANESTHESIA: ICD-10-PCS | Mod: CRNA,,, | Performed by: NURSE ANESTHETIST, CERTIFIED REGISTERED

## 2022-11-12 PROCEDURE — 36000709 HC OR TIME LEV III EA ADD 15 MIN: Performed by: ORTHOPAEDIC SURGERY

## 2022-11-12 PROCEDURE — 82962 GLUCOSE BLOOD TEST: CPT

## 2022-11-12 PROCEDURE — 99285 EMERGENCY DEPT VISIT HI MDM: CPT | Mod: 25

## 2022-11-12 PROCEDURE — 25000003 PHARM REV CODE 250: Performed by: NURSE ANESTHETIST, CERTIFIED REGISTERED

## 2022-11-12 PROCEDURE — 85025 COMPLETE CBC W/AUTO DIFF WBC: CPT | Performed by: NURSE PRACTITIONER

## 2022-11-12 PROCEDURE — 25607 PR OPEN RX DISTAL RADIUS FX, EXTRA-ARTICULAR: ICD-10-PCS | Mod: RT,,, | Performed by: ORTHOPAEDIC SURGERY

## 2022-11-12 PROCEDURE — 63600175 PHARM REV CODE 636 W HCPCS: Performed by: NURSE ANESTHETIST, CERTIFIED REGISTERED

## 2022-11-12 PROCEDURE — 25600 PR CLOSED RX DIST RAD/ULNA FX: ICD-10-PCS | Mod: 59,LT,, | Performed by: ORTHOPAEDIC SURGERY

## 2022-11-12 PROCEDURE — 37000009 HC ANESTHESIA EA ADD 15 MINS: Performed by: ORTHOPAEDIC SURGERY

## 2022-11-12 PROCEDURE — 63600175 PHARM REV CODE 636 W HCPCS: Performed by: NURSE PRACTITIONER

## 2022-11-12 PROCEDURE — 99285 EMERGENCY DEPT VISIT HI MDM: CPT | Mod: ,,, | Performed by: NURSE PRACTITIONER

## 2022-11-12 DEVICE — SCREW STRDRV REC T8 2.7X14 SS: Type: IMPLANTABLE DEVICE | Site: WRIST | Status: FUNCTIONAL

## 2022-11-12 DEVICE — SCREW LOCKING 2.4 X 20MM: Type: IMPLANTABLE DEVICE | Site: WRIST | Status: FUNCTIONAL

## 2022-11-12 DEVICE — SCREW LOCKING 2.4 X 18MM: Type: IMPLANTABLE DEVICE | Site: WRIST | Status: FUNCTIONAL

## 2022-11-12 RX ORDER — LIDOCAINE HYDROCHLORIDE 20 MG/ML
INJECTION, SOLUTION EPIDURAL; INFILTRATION; INTRACAUDAL; PERINEURAL
Status: DISCONTINUED | OUTPATIENT
Start: 2022-11-12 | End: 2022-11-12

## 2022-11-12 RX ORDER — CIPROFLOXACIN 500 MG/1
500 TABLET ORAL EVERY 12 HOURS
Status: DISCONTINUED | OUTPATIENT
Start: 2022-11-12 | End: 2022-11-13 | Stop reason: HOSPADM

## 2022-11-12 RX ORDER — MORPHINE SULFATE 4 MG/ML
4 INJECTION, SOLUTION INTRAMUSCULAR; INTRAVENOUS
Status: COMPLETED | OUTPATIENT
Start: 2022-11-12 | End: 2022-11-12

## 2022-11-12 RX ORDER — HYDROXYZINE PAMOATE 25 MG/1
25 CAPSULE ORAL 2 TIMES DAILY PRN
Status: DISCONTINUED | OUTPATIENT
Start: 2022-11-12 | End: 2022-11-13 | Stop reason: HOSPADM

## 2022-11-12 RX ORDER — ONDANSETRON 2 MG/ML
4 INJECTION INTRAMUSCULAR; INTRAVENOUS
Status: COMPLETED | OUTPATIENT
Start: 2022-11-12 | End: 2022-11-12

## 2022-11-12 RX ORDER — SUCCINYLCHOLINE CHLORIDE 20 MG/ML
INJECTION INTRAMUSCULAR; INTRAVENOUS
Status: DISCONTINUED | OUTPATIENT
Start: 2022-11-12 | End: 2022-11-12

## 2022-11-12 RX ORDER — HYDROMORPHONE HYDROCHLORIDE 2 MG/ML
1 INJECTION, SOLUTION INTRAMUSCULAR; INTRAVENOUS; SUBCUTANEOUS EVERY 4 HOURS PRN
Status: DISCONTINUED | OUTPATIENT
Start: 2022-11-12 | End: 2022-11-13 | Stop reason: HOSPADM

## 2022-11-12 RX ORDER — CETIRIZINE HYDROCHLORIDE 10 MG/1
10 TABLET ORAL DAILY
Status: DISCONTINUED | OUTPATIENT
Start: 2022-11-13 | End: 2022-11-13 | Stop reason: HOSPADM

## 2022-11-12 RX ORDER — IPRATROPIUM BROMIDE AND ALBUTEROL SULFATE 2.5; .5 MG/3ML; MG/3ML
3 SOLUTION RESPIRATORY (INHALATION) ONCE AS NEEDED
Status: DISCONTINUED | OUTPATIENT
Start: 2022-11-12 | End: 2022-11-12 | Stop reason: HOSPADM

## 2022-11-12 RX ORDER — PROPOFOL 10 MG/ML
VIAL (ML) INTRAVENOUS
Status: DISCONTINUED | OUTPATIENT
Start: 2022-11-12 | End: 2022-11-12

## 2022-11-12 RX ORDER — PANTOPRAZOLE SODIUM 40 MG/1
40 TABLET, DELAYED RELEASE ORAL DAILY
Status: DISCONTINUED | OUTPATIENT
Start: 2022-11-13 | End: 2022-11-13 | Stop reason: HOSPADM

## 2022-11-12 RX ORDER — POLYETHYLENE GLYCOL 3350 17 G/17G
17 POWDER, FOR SOLUTION ORAL DAILY
Status: DISCONTINUED | OUTPATIENT
Start: 2022-11-13 | End: 2022-11-13 | Stop reason: HOSPADM

## 2022-11-12 RX ORDER — ROCURONIUM BROMIDE 10 MG/ML
INJECTION, SOLUTION INTRAVENOUS
Status: DISCONTINUED | OUTPATIENT
Start: 2022-11-12 | End: 2022-11-12

## 2022-11-12 RX ORDER — ONDANSETRON 2 MG/ML
INJECTION INTRAMUSCULAR; INTRAVENOUS
Status: DISCONTINUED | OUTPATIENT
Start: 2022-11-12 | End: 2022-11-12

## 2022-11-12 RX ORDER — HYDROCHLOROTHIAZIDE 12.5 MG/1
12.5 TABLET ORAL DAILY
Status: DISCONTINUED | OUTPATIENT
Start: 2022-11-13 | End: 2022-11-13 | Stop reason: HOSPADM

## 2022-11-12 RX ORDER — DIPHENHYDRAMINE HYDROCHLORIDE 50 MG/ML
25 INJECTION INTRAMUSCULAR; INTRAVENOUS EVERY 6 HOURS PRN
Status: DISCONTINUED | OUTPATIENT
Start: 2022-11-12 | End: 2022-11-12 | Stop reason: HOSPADM

## 2022-11-12 RX ORDER — HYDROMORPHONE HYDROCHLORIDE 2 MG/ML
0.5 INJECTION, SOLUTION INTRAMUSCULAR; INTRAVENOUS; SUBCUTANEOUS EVERY 5 MIN PRN
Status: DISCONTINUED | OUTPATIENT
Start: 2022-11-12 | End: 2022-11-12 | Stop reason: HOSPADM

## 2022-11-12 RX ORDER — ALBUTEROL SULFATE 90 UG/1
2 AEROSOL, METERED RESPIRATORY (INHALATION) EVERY 6 HOURS PRN
Status: DISCONTINUED | OUTPATIENT
Start: 2022-11-12 | End: 2022-11-12 | Stop reason: CLARIF

## 2022-11-12 RX ORDER — PROMETHAZINE HYDROCHLORIDE 25 MG/1
25 TABLET ORAL EVERY 6 HOURS PRN
Status: DISCONTINUED | OUTPATIENT
Start: 2022-11-12 | End: 2022-11-13 | Stop reason: HOSPADM

## 2022-11-12 RX ORDER — ONDANSETRON 2 MG/ML
4 INJECTION INTRAMUSCULAR; INTRAVENOUS DAILY PRN
Status: DISCONTINUED | OUTPATIENT
Start: 2022-11-12 | End: 2022-11-12 | Stop reason: HOSPADM

## 2022-11-12 RX ORDER — EPHEDRINE SULFATE 50 MG/ML
INJECTION, SOLUTION INTRAVENOUS
Status: DISCONTINUED | OUTPATIENT
Start: 2022-11-12 | End: 2022-11-12

## 2022-11-12 RX ORDER — MUPIROCIN 20 MG/G
OINTMENT TOPICAL 2 TIMES DAILY
Status: DISCONTINUED | OUTPATIENT
Start: 2022-11-12 | End: 2022-11-13 | Stop reason: HOSPADM

## 2022-11-12 RX ORDER — ALBUTEROL SULFATE 0.83 MG/ML
2.5 SOLUTION RESPIRATORY (INHALATION) EVERY 6 HOURS PRN
Status: DISCONTINUED | OUTPATIENT
Start: 2022-11-12 | End: 2022-11-13 | Stop reason: HOSPADM

## 2022-11-12 RX ORDER — MORPHINE SULFATE 10 MG/ML
4 INJECTION INTRAMUSCULAR; INTRAVENOUS; SUBCUTANEOUS EVERY 5 MIN PRN
Status: DISCONTINUED | OUTPATIENT
Start: 2022-11-12 | End: 2022-11-12 | Stop reason: HOSPADM

## 2022-11-12 RX ORDER — ONDANSETRON 4 MG/1
8 TABLET, ORALLY DISINTEGRATING ORAL EVERY 8 HOURS PRN
Status: DISCONTINUED | OUTPATIENT
Start: 2022-11-12 | End: 2022-11-13 | Stop reason: HOSPADM

## 2022-11-12 RX ORDER — ESCITALOPRAM OXALATE 10 MG/1
10 TABLET ORAL DAILY
Status: DISCONTINUED | OUTPATIENT
Start: 2022-11-13 | End: 2022-11-13 | Stop reason: HOSPADM

## 2022-11-12 RX ORDER — DOCUSATE SODIUM 100 MG/1
100 CAPSULE, LIQUID FILLED ORAL 2 TIMES DAILY
Status: DISCONTINUED | OUTPATIENT
Start: 2022-11-12 | End: 2022-11-13 | Stop reason: HOSPADM

## 2022-11-12 RX ORDER — DEXAMETHASONE SODIUM PHOSPHATE 4 MG/ML
INJECTION, SOLUTION INTRA-ARTICULAR; INTRALESIONAL; INTRAMUSCULAR; INTRAVENOUS; SOFT TISSUE
Status: DISCONTINUED | OUTPATIENT
Start: 2022-11-12 | End: 2022-11-12

## 2022-11-12 RX ORDER — OXYCODONE HYDROCHLORIDE 5 MG/1
5 TABLET ORAL EVERY 4 HOURS PRN
Status: DISCONTINUED | OUTPATIENT
Start: 2022-11-12 | End: 2022-11-13 | Stop reason: HOSPADM

## 2022-11-12 RX ORDER — FENTANYL CITRATE 50 UG/ML
INJECTION, SOLUTION INTRAMUSCULAR; INTRAVENOUS
Status: DISCONTINUED | OUTPATIENT
Start: 2022-11-12 | End: 2022-11-12

## 2022-11-12 RX ORDER — SODIUM CHLORIDE, SODIUM LACTATE, POTASSIUM CHLORIDE, CALCIUM CHLORIDE 600; 310; 30; 20 MG/100ML; MG/100ML; MG/100ML; MG/100ML
INJECTION, SOLUTION INTRAVENOUS CONTINUOUS PRN
Status: DISCONTINUED | OUTPATIENT
Start: 2022-11-12 | End: 2022-11-12

## 2022-11-12 RX ADMIN — PROPOFOL 150 MG: 10 INJECTION, EMULSION INTRAVENOUS at 06:11

## 2022-11-12 RX ADMIN — ROCURONIUM BROMIDE 10 MG: 10 INJECTION INTRAVENOUS at 06:11

## 2022-11-12 RX ADMIN — MUPIROCIN: 20 OINTMENT TOPICAL at 10:11

## 2022-11-12 RX ADMIN — ONDANSETRON HYDROCHLORIDE 4 MG: 2 SOLUTION INTRAMUSCULAR; INTRAVENOUS at 03:11

## 2022-11-12 RX ADMIN — MORPHINE SULFATE 4 MG: 4 INJECTION INTRAVENOUS at 03:11

## 2022-11-12 RX ADMIN — ROCURONIUM BROMIDE 20 MG: 10 INJECTION INTRAVENOUS at 07:11

## 2022-11-12 RX ADMIN — LIDOCAINE HYDROCHLORIDE 100 MG: 20 INJECTION, SOLUTION EPIDURAL; INFILTRATION; INTRACAUDAL; PERINEURAL at 06:11

## 2022-11-12 RX ADMIN — OXYCODONE HYDROCHLORIDE 5 MG: 5 TABLET ORAL at 09:11

## 2022-11-12 RX ADMIN — SUCCINYLCHOLINE CHLORIDE 100 MG: 20 INJECTION, SOLUTION INTRAMUSCULAR; INTRAVENOUS at 06:11

## 2022-11-12 RX ADMIN — CIPROFLOXACIN 500 MG: 500 TABLET, FILM COATED ORAL at 10:11

## 2022-11-12 RX ADMIN — SUGAMMADEX 200 MG: 100 INJECTION, SOLUTION INTRAVENOUS at 07:11

## 2022-11-12 RX ADMIN — FENTANYL CITRATE 100 MCG: 50 INJECTION INTRAMUSCULAR; INTRAVENOUS at 06:11

## 2022-11-12 RX ADMIN — MORPHINE SULFATE 4 MG: 4 INJECTION INTRAVENOUS at 04:11

## 2022-11-12 RX ADMIN — DEXAMETHASONE SODIUM PHOSPHATE 8 MG: 4 INJECTION, SOLUTION INTRA-ARTICULAR; INTRALESIONAL; INTRAMUSCULAR; INTRAVENOUS; SOFT TISSUE at 06:11

## 2022-11-12 RX ADMIN — SODIUM CHLORIDE, POTASSIUM CHLORIDE, SODIUM LACTATE AND CALCIUM CHLORIDE: 600; 310; 30; 20 INJECTION, SOLUTION INTRAVENOUS at 06:11

## 2022-11-12 RX ADMIN — EPHEDRINE SULFATE 25 MG: 50 INJECTION INTRAVENOUS at 06:11

## 2022-11-12 RX ADMIN — DOCUSATE SODIUM 100 MG: 100 CAPSULE, LIQUID FILLED ORAL at 10:11

## 2022-11-12 RX ADMIN — ONDANSETRON 4 MG: 2 INJECTION INTRAMUSCULAR; INTRAVENOUS at 06:11

## 2022-11-12 RX ADMIN — CEFAZOLIN 2 G: 1 INJECTION, POWDER, FOR SOLUTION INTRAMUSCULAR; INTRAVENOUS; PARENTERAL at 06:11

## 2022-11-12 NOTE — ED TRIAGE NOTES
Patient was on a trail ride when her horse started bucking and she was thrown with arms outstretched. C/o bilateral wrist pain with obvious right wrist deformity

## 2022-11-12 NOTE — ED PROVIDER NOTES
Encounter Date: 2022       History     Chief Complaint   Patient presents with    Fall     Bucked off horse     Ms. Crews presents to ED after a fall from a horse earlier today. She complains of bilateral wrist pain after the fall. Right wrist is deformed. She denies striking her head or any back pain.     Review of patient's allergies indicates:  No Known Allergies  Past Medical History:   Diagnosis Date    Allergic rhinitis, mild     Anemia     Anxiety     Degenerative disc disease, cervical     Depression     GERD (gastroesophageal reflux disease)     Hyperlipidemia     Hypertension     Insomnia     Migraine     Vitamin D deficiency      Past Surgical History:   Procedure Laterality Date    ANGIOGRAM, CORONARY, WITH LEFT HEART CATHETERIZATION Left 2021    Procedure: Left heart cath w/ coronary angiograms;  Surgeon: Demi Carias MD;  Location: [facility] CATH LAB;  Service: Cardiology;  Laterality: Left;    INGUINAL HERNIA REPAIR Right     LAPAROSCOPIC CHOLECYSTECTOMY WITH CHOLANGIOGRAPHY N/A 2022    Procedure: CHOLECYSTECTOMY, LAPAROSCOPIC, WITH CHOLANGIOGRAM;  Surgeon: Steve Florez MD;  Location: [facility] OR;  Service: General;  Laterality: N/A;    LEFT HEART CATHETERIZATION Left 2/10/2022    Procedure: Left heart cath;  Surgeon: Francisco Rosenbaum MD;  Location: [facility] CATH LAB;  Service: Cardiology;  Laterality: Left;     Family History   Problem Relation Age of Onset    Cancer Father     Hypertension Father     Diabetes Other     Arthritis Other      Social History     Tobacco Use    Smoking status: Former     Types: Cigarettes     Quit date: 10/2009     Years since quittin.1    Smokeless tobacco: Never   Substance Use Topics    Alcohol use: Never    Drug use: Never     Review of Systems   Musculoskeletal:  Positive for joint swelling.   All other systems reviewed and are negative.    Physical Exam     Initial Vitals [22 1500]   BP Pulse Resp  Temp SpO2   113/67 64 20 97.8 °F (36.6 °C) (!) 94 %      MAP       --         Physical Exam    Vitals reviewed.  Constitutional: She appears well-developed and well-nourished.   HENT:   Head: Normocephalic.   Eyes: Pupils are equal, round, and reactive to light.   Neck:   Normal range of motion.  Cardiovascular:  Normal rate.           Musculoskeletal:         General: Normal range of motion.      Cervical back: Normal range of motion.     Neurological: She is alert and oriented to person, place, and time.   Skin: Skin is warm. Capillary refill takes less than 2 seconds.   Psychiatric: She has a normal mood and affect. Her behavior is normal. Judgment and thought content normal.       Medical Screening Exam   See Full Note    ED Course   Procedures  Labs Reviewed - No data to display       Imaging Results              X-Ray Wrist Complete Bilateral (Final result)  Result time 11/12/22 15:28:57   Procedure changed from X-Ray Wrist Complete Right     Final result by Chapin Mancini MD (11/12/22 15:28:57)                   Impression:      Acute comminuted intra-articular fractures of either distal radius as detailed above      Electronically signed by: Chapin Mancini  Date:    11/12/2022  Time:    15:28               Narrative:    EXAMINATION:  XR WRIST COMPLETE 3 VIEWS BILATERAL    CLINICAL HISTORY:  wrist injury;.  Unspecified fall, initial encounter    COMPARISON:  Left wrist x-ray January 3, 2019    TECHNIQUE:  AP, lateral, and oblique views of either wrist.  Six total views    FINDINGS:  There is a minimally displaced acute comminuted intra-articular fracture of the distal left radial metaphysis and epiphysis with relatively good alignment.    There is an acute comminuted intra-articular fracture distal metaphysis and epiphysis of the right radius with mild dorsal displacement of the main distal fracture fragment and moderate dorsal distal angulation.  There is likely an element of impaction present.  There  is asymmetric soft tissue swelling, right wrist more so than left                                       Medications   morphine injection 4 mg (4 mg Intravenous Given 11/12/22 1500)   ondansetron injection 4 mg (4 mg Intravenous Given 11/12/22 1500)                 ED Course as of 11/12/22 1842   Sat Nov 12, 2022   1547 X-Ray Wrist Complete Bilateral [SW]   1601 I called Dr. JUDD Zepeda with xray report. He gave additional orders for CT wrist and call him with report. I updated ms. Radha and gave additional orders for pain medication.  [SW]      ED Course User Index  [SW] Meaghan Srikanth Weathers, [facility]          Clinical Impression:   Final diagnoses:  [T14.90XA] Trauma  [W19.XXXA] Fall               [facility]  11/12/22 1544       [facility]  11/12/22 1844

## 2022-11-12 NOTE — ANESTHESIA PREPROCEDURE EVALUATION
11/12/2022  Radha Crews is a 62 y.o., female.      Pre-op Assessment    I have reviewed the Patient Summary Reports.     I have reviewed the Nursing Notes. I have reviewed the NPO Status.      Review of Systems  Anesthesia Hx:  States she had a period of psychosis several hours after a lap casie performed a few months ago - unsure of causative agent - noted h/o anxiety for which she takes lexapro and vistaril daily Denies Family Hx of Anesthesia complications.   Denies Personal Hx of Anesthesia complications.   Social:  Former Smoker, No Alcohol Use    Cardiovascular:   Exercise tolerance: good Hypertension, well controlled hyperlipidemia    Pulmonary:   Shortness of breath Developed chronic SOB post covid - symptoms improved with bronchodilator therapy   Hepatic/GI:   GERD    Musculoskeletal:   Arthritis   Spine Disorders: Degenerative disease    Neurological:   Headaches    Psych:   Psychiatric History anxiety          Physical Exam  General: Well nourished, Cooperative and Alert    Airway:  Mallampati: II   Mouth Opening: Normal  TM Distance: Normal  Tongue: Normal  Neck ROM: Normal ROM    Dental:  Intact    Chest/Lungs:  Clear to auscultation, Normal Respiratory Rate    Heart:  Rate: Normal  Rhythm: Regular Rhythm        Chemistry        Component Value Date/Time     05/19/2022 1217    K 4.2 05/19/2022 1217     05/19/2022 1217    CO2 26 05/19/2022 1217    BUN 17 05/19/2022 1217    CREATININE 0.98 05/19/2022 1217    GLU 60 (L) 05/19/2022 1217        Component Value Date/Time    CALCIUM 8.9 05/19/2022 1217    ALKPHOS 129 05/19/2022 1217    AST 22 05/19/2022 1217    ALT 32 05/19/2022 1217    BILITOT 0.3 05/19/2022 1217    EGFRNONAA 62 05/19/2022 1217        Lab Results   Component Value Date    WBC 9.97 11/12/2022    RBC 5.09 11/12/2022    HGB 14.4 11/12/2022    MCV 84.1 11/12/2022    MCH  28.3 11/12/2022    MCHC 33.6 11/12/2022    RDW 14.0 11/12/2022     11/12/2022    MPV 9.9 11/12/2022    LYMPH 8.7 (L) 11/12/2022    LYMPH 0.87 (L) 11/12/2022    MONO 5.4 11/12/2022    EOS 0.03 11/12/2022    BASO 0.03 11/12/2022     Results for orders placed or performed during the hospital encounter of 02/08/22   EKG 12-lead    Collection Time: 02/09/22  9:59 PM    Narrative    Test Reason : R77.8,    Vent. Rate : 116 BPM     Atrial Rate : 116 BPM     P-R Int : 186 ms          QRS Dur : 080 ms      QT Int : 316 ms       P-R-T Axes : 057 072 042 degrees     QTc Int : 439 ms    Sinus tachycardia  Nonspecific ST and T wave abnormality  Abnormal ECG  When compared with ECG of 09-FEB-2022 09:57,  Nonspecific T wave abnormality now evident in Inferior leads  Nonspecific T wave abnormality now evident in Lateral leads  Confirmed by Enrique Jara DO ([id number]) on 2/14/2022 12:18:20 PM    Referred By: [email or web address]   SELF           Confirmed By:Enrique Jara DO         Anesthesia Plan  Type of Anesthesia, risks & benefits discussed:    Anesthesia Type: Gen ETT  Intra-op Monitoring Plan: Standard ASA Monitors  Post Op Pain Control Plan: multimodal analgesia  Induction:  IV and rapid sequence  Airway Plan: Direct, Post-Induction  Informed Consent: Informed consent signed with the Patient and all parties understand the risks and agree with anesthesia plan.  All questions answered.   ASA Score: 2 Emergent  Day of Surgery Review of History & Physical: H&P Update referred to the surgeon/provider.I have interviewed and examined the patient. I have reviewed the patient's H&P dated: There are no significant changes.   Anesthesia Plan Notes: NPO status 6 hours since last meal - plan GETA with RSI      Patient has received morphine 8mg IV and zofran 4mg IV in the ER prior to arrival to surgical theater    Ready For Surgery From Anesthesia Perspective.     .

## 2022-11-13 VITALS
BODY MASS INDEX: 32.16 KG/M2 | HEART RATE: 72 BPM | OXYGEN SATURATION: 96 % | DIASTOLIC BLOOD PRESSURE: 51 MMHG | RESPIRATION RATE: 20 BRPM | WEIGHT: 181.5 LBS | TEMPERATURE: 98 F | HEIGHT: 63 IN | SYSTOLIC BLOOD PRESSURE: 127 MMHG

## 2022-11-13 LAB
BASOPHILS # BLD AUTO: 0.03 K/UL (ref 0–0.2)
BASOPHILS NFR BLD AUTO: 0.3 % (ref 0–1)
DIFFERENTIAL METHOD BLD: ABNORMAL
EOSINOPHIL # BLD AUTO: 0.03 K/UL (ref 0–0.5)
EOSINOPHIL NFR BLD AUTO: 0.3 % (ref 1–4)
ERYTHROCYTE [DISTWIDTH] IN BLOOD BY AUTOMATED COUNT: 14 % (ref 11.5–14.5)
HCT VFR BLD AUTO: 42.8 % (ref 38–47)
HGB BLD-MCNC: 14.4 G/DL (ref 12–16)
IMM GRANULOCYTES # BLD AUTO: 0.04 K/UL (ref 0–0.04)
IMM GRANULOCYTES NFR BLD: 0.4 % (ref 0–0.4)
LYMPHOCYTES # BLD AUTO: 0.87 K/UL (ref 1–4.8)
LYMPHOCYTES NFR BLD AUTO: 8.7 % (ref 27–41)
MCH RBC QN AUTO: 28.3 PG (ref 27–31)
MCHC RBC AUTO-ENTMCNC: 33.6 G/DL (ref 32–36)
MCV RBC AUTO: 84.1 FL (ref 80–96)
MONOCYTES # BLD AUTO: 0.54 K/UL (ref 0–0.8)
MONOCYTES NFR BLD AUTO: 5.4 % (ref 2–6)
MPC BLD CALC-MCNC: 9.9 FL (ref 9.4–12.4)
NEUTROPHILS # BLD AUTO: 8.46 K/UL (ref 1.8–7.7)
NEUTROPHILS NFR BLD AUTO: 84.9 % (ref 53–65)
NRBC # BLD AUTO: 0 X10E3/UL
NRBC, AUTO (.00): 0 %
PLATELET # BLD AUTO: 213 K/UL (ref 150–400)
PLATELET MORPHOLOGY: ABNORMAL
RBC # BLD AUTO: 5.09 M/UL (ref 4.2–5.4)
RBC MORPH BLD: NORMAL
WBC # BLD AUTO: 9.97 K/UL (ref 4.5–11)

## 2022-11-13 PROCEDURE — 97165 OT EVAL LOW COMPLEX 30 MIN: CPT

## 2022-11-13 PROCEDURE — 94761 N-INVAS EAR/PLS OXIMETRY MLT: CPT

## 2022-11-13 PROCEDURE — 25000003 PHARM REV CODE 250: Performed by: ORTHOPAEDIC SURGERY

## 2022-11-13 PROCEDURE — 97161 PT EVAL LOW COMPLEX 20 MIN: CPT

## 2022-11-13 RX ORDER — ONDANSETRON 4 MG/1
8 TABLET, ORALLY DISINTEGRATING ORAL EVERY 8 HOURS PRN
Qty: 30 TABLET | Refills: 0 | Status: SHIPPED | OUTPATIENT
Start: 2022-11-13 | End: 2024-04-02

## 2022-11-13 RX ORDER — OXYCODONE HYDROCHLORIDE 5 MG/1
5 TABLET ORAL EVERY 4 HOURS PRN
Status: DISCONTINUED | OUTPATIENT
Start: 2022-11-13 | End: 2022-11-13 | Stop reason: HOSPADM

## 2022-11-13 RX ORDER — HYDROMORPHONE HYDROCHLORIDE 2 MG/ML
1 INJECTION, SOLUTION INTRAMUSCULAR; INTRAVENOUS; SUBCUTANEOUS EVERY 4 HOURS PRN
Status: DISCONTINUED | OUTPATIENT
Start: 2022-11-13 | End: 2022-11-13 | Stop reason: HOSPADM

## 2022-11-13 RX ORDER — MUPIROCIN 20 MG/G
OINTMENT TOPICAL 2 TIMES DAILY
Status: DISCONTINUED | OUTPATIENT
Start: 2022-11-13 | End: 2022-11-13 | Stop reason: HOSPADM

## 2022-11-13 RX ORDER — OXYCODONE AND ACETAMINOPHEN 5; 325 MG/1; MG/1
1 TABLET ORAL EVERY 6 HOURS PRN
Qty: 30 TABLET | Refills: 0 | Status: SHIPPED | OUTPATIENT
Start: 2022-11-13 | End: 2022-11-29 | Stop reason: SDUPTHER

## 2022-11-13 RX ORDER — ONDANSETRON 4 MG/1
8 TABLET, ORALLY DISINTEGRATING ORAL EVERY 8 HOURS PRN
Status: DISCONTINUED | OUTPATIENT
Start: 2022-11-13 | End: 2022-11-13 | Stop reason: HOSPADM

## 2022-11-13 RX ORDER — SODIUM CHLORIDE 0.9 % (FLUSH) 0.9 %
2 SYRINGE (ML) INJECTION EVERY 8 HOURS PRN
Status: DISCONTINUED | OUTPATIENT
Start: 2022-11-13 | End: 2022-11-13 | Stop reason: HOSPADM

## 2022-11-13 RX ADMIN — OXYCODONE HYDROCHLORIDE 5 MG: 5 TABLET ORAL at 05:11

## 2022-11-13 RX ADMIN — DOCUSATE SODIUM 100 MG: 100 CAPSULE, LIQUID FILLED ORAL at 08:11

## 2022-11-13 RX ADMIN — HYDROXYZINE PAMOATE 25 MG: 25 CAPSULE ORAL at 01:11

## 2022-11-13 RX ADMIN — OXYCODONE HYDROCHLORIDE 5 MG: 5 TABLET ORAL at 12:11

## 2022-11-13 RX ADMIN — CIPROFLOXACIN 500 MG: 500 TABLET, FILM COATED ORAL at 08:11

## 2022-11-13 RX ADMIN — MUPIROCIN: 20 OINTMENT TOPICAL at 09:11

## 2022-11-13 RX ADMIN — OXYCODONE HYDROCHLORIDE 5 MG: 5 TABLET ORAL at 10:11

## 2022-11-13 RX ADMIN — CETIRIZINE HYDROCHLORIDE 10 MG: 10 TABLET, FILM COATED ORAL at 08:11

## 2022-11-13 RX ADMIN — HYDROCHLOROTHIAZIDE 12.5 MG: 12.5 TABLET ORAL at 08:11

## 2022-11-13 RX ADMIN — PANTOPRAZOLE SODIUM 40 MG: 40 TABLET, DELAYED RELEASE ORAL at 08:11

## 2022-11-13 RX ADMIN — ESCITALOPRAM OXALATE 10 MG: 10 TABLET ORAL at 08:11

## 2022-11-13 NOTE — PROGRESS NOTES
1950 REC'ED TO RR EASILY AROUSED. ORAL AIRWAY REMOVED, FOLLOWS COMMANDS, BREEZY. DRESSING/SPLINT/ ARMSLING RIGHT ARM. RIGHT HAND MOBILE, VERBALIZES GOOD SENSATION. CAST TO LEFT FOREARM. LEFT HAND/FINGERS DUSKY, MOBILE, VERBALIZES SENSATION. DR. BRIAN GAINES MADE AWARE OF LEFT HAND COLOR PER CARLOS VALERIO. NO NEW ORDERS. LEFT ARM ELEVATED ON PILLOW. NO C/O PAIN. IV INFUSING LEFT EJ 20G. CATH.  DR. OCONNOR AT BEDSIDE.      2010 HEART RATE 110'S DR. OCONNOR HERE AND AWARE. FLUID BOLUS IN PROGRESS.    2025 X-RAYS COMPLETED PER HANS VAZQUEZ. WELL.      2030 AWAKE, ALERT NO C/O PAIN.  NO CHANGES NOTED. TRANSFERRED TO ROOM.

## 2022-11-13 NOTE — PROGRESS NOTES
Daily Orthopaedic Progress Note    Radha Crews is a 62 y.o. female admitted on 11/12/2022  Hospital Day: 0  Post Op Day: 1 Day Post-Op    Antibiotics (From admission, onward)      Start     Stop Route Frequency Ordered    11/12/22 2115  ciprofloxacin HCl tablet 500 mg         -- Oral Every 12 hours 11/12/22 2110 11/12/22 2115  mupirocin 2 % ointment         11/17 2059 Nasl 2 times daily 11/12/22 2110             The patient was seen and examined this morning at the bedside. Patient reports no acute issues overnight and adequate control of pain on current regimen.  Patient worked with physical therapy over the last 24 hours.      PHYSICAL EXAM:  Awake/alert/oriented x3, No acute distress, Afebrile, Vital signs stable  Normocephalic, Atraumatic  Good inspiratory effort with unlaboured breathing  Dressings clean/dry/intact, Operative limb neurovascularly intact with 5/5 strength distally and sensation intact to light touch distally; some decreased sensation over the area of the regional block and palpable pulses  Vitals:    11/13/22 0545 11/13/22 0600 11/13/22 0801 11/13/22 1047   BP:  (!) 126/52     BP Location:       Patient Position:       Pulse:  66     Resp: 18 18  20   Temp:  97.9 °F (36.6 °C)     TempSrc:       SpO2:  (!) 93% (!) 93%    Weight:       Height:         I/O last 3 completed shifts:  In: 1400 [I.V.:1350; IV Piggyback:50]  Out: 2 [Blood:2]    Significant Labs:  Recent Lab Results         11/12/22  1758   11/12/22  1717        Anion Gap   16       Baso #   0.03       Basophil %   0.3       BUN   18       BUN/CREAT RATIO   14       Calcium   8.6       Chloride   105       CO2   22       Creatinine   1.26       Differential Type   Scan Smear       eGFR   49       Eos #   0.03       Eosinophil %   0.3       Glucose   138       Hematocrit   42.8       Hemoglobin   14.4       Immature Grans (Abs)   0.04       Immature Granulocytes   0.4       Lymph #   0.87       Lymph %   8.7       MCH   28.3        MCHC   33.6       MCV   84.1       Mono #   0.54       Mono %   5.4       MPV   9.9       Neutrophils, Abs   8.46       Neutrophils Relative   84.9       nRBC   0.0       NUCLEATED RBC ABSOLUTE   0.00       PLATELET MORPHOLOGY   Few Large Platelets       Platelets   213       POC Glucose 136         Potassium   4.1       RBC   5.09       RBC Morphology   Normal       RDW   14.0       Sodium   139       WBC   9.97               Significant Diagnostics:  X-Ray Wrist Complete Bilateral  Narrative: EXAMINATION:  XR WRIST COMPLETE 3 VIEWS BILATERAL    CLINICAL HISTORY:  Bilateral distal radial fractures status post reduction    COMPARISON:  11/12/2022    TECHNIQUE:  AP, lateral, and oblique views of each wrist.  Six total views    FINDINGS:  There is stable alignment and positioning of the fracture of the distal left radius following closed reduction.  There is superimposed cast material.    There has been interval ORIF of the distal right radial fracture with metallic plate and screws.  There is minimal lateral displacement and relatively good alignment.  There is superimposed cast material  Impression: Postop reduction of the bilateral distal radial fractures    Electronically signed by: Chapin Mancini  Date:    11/12/2022  Time:    20:55  CT Wrist Without Contrast Right  Narrative: EXAMINATION:  CT WRIST WITHOUT CONTRAST RIGHT    CLINICAL HISTORY:  Wrist fracture    COMPARISON:  Right wrist x-ray    TECHNIQUE:  Spiral CT sections were obtained through the right wrist without IV contrast.  Sagittal and coronal multiplanar reconstruction images are also examined.    The CT examination was performed using one or more of the following dose reduction techniques: Automated exposure control, adjustment of the mA and kV according to patient's size, use of acute or iterative reconstruction techniques.    FINDINGS:  There is an acute comminuted fracture of the distal metaphysis of the right radius with mild dorsal  displacement of the main distal fracture fragment and moderate dorsal distal angulation.  There is moderate impaction at the fracture site.  Contrary to the x-ray report, there does not appear to be a definite intra-articular component of this fracture.  No additional fracture is seen.  There is a benign appearing 8 mm geographic lytic lesion in the lunate which presumably represents enchondroma  Impression: Acute comminuted fracture distal metaphysis right radius    Electronically signed by: Chapin Mancini  Date:    11/12/2022  Time:    17:13  X-Ray Wrist Complete Bilateral  Narrative: EXAMINATION:  XR WRIST COMPLETE 3 VIEWS BILATERAL    CLINICAL HISTORY:  wrist injury;.  Unspecified fall, initial encounter    COMPARISON:  Left wrist x-ray January 3, 2019    TECHNIQUE:  AP, lateral, and oblique views of either wrist.  Six total views    FINDINGS:  There is a minimally displaced acute comminuted intra-articular fracture of the distal left radial metaphysis and epiphysis with relatively good alignment.    There is an acute comminuted intra-articular fracture distal metaphysis and epiphysis of the right radius with mild dorsal displacement of the main distal fracture fragment and moderate dorsal distal angulation.  There is likely an element of impaction present.  There is asymmetric soft tissue swelling, right wrist more so than left  Impression: Acute comminuted intra-articular fractures of either distal radius as detailed above    Electronically signed by: Chapin Mancini  Date:    11/12/2022  Time:    15:28       A/P: 62 y.o. female 1 Day Post-Op s/p bilateral wrist fracture  -Continue with current pain control regimen  -Continue with current physical therapy plan  -Continue with DVT prophylaxis,  -Anticipate discharge to home today    Maverick Zepeda MD  Orthopaedic Staff Surgeon

## 2022-11-13 NOTE — PT/OT/SLP EVAL
Physical Therapy Evaluation and Discharge Note    Patient Name:  Radha Crews   MRN:  [id number]    Recommendations:     Discharge Recommendations:  home   Discharge Equipment Recommendations: none   Barriers to discharge: None    Assessment:     Radha Crews is a 62 y.o. female admitted with a medical diagnosis of Closed Chance's fracture of right radius. .  At this time, patient is functioning at their prior level of function and does not require further acute PT services.     Recent Surgery: Procedure(s) (LRB):  ORIF, WRIST (Right)  CLOSED REDUCTION, FRACTURE, WITH CAST APPLICATION (Left) 1 Day Post-Op    Plan:     During this hospitalization, patient does not require further acute PT services.  Please re-consult if situation changes.      Subjective     Chief Complaint: bilat wrist fractures  Patient/Family Comments/goals: agreeable to eval  Pain/Comfort:  Pain Rating 1: 6/10  Location - Side 1: Bilateral  Location 1: wrist  Pain Addressed 1: Reposition, Distraction  Pain Rating Post-Intervention 1: 6/10    Patients cultural, spiritual, Hoahaoism conflicts given the current situation: no    Living Environment:  Home with  in 1 story with 5 steps with rail to enter.   Prior to admission, patients level of function was indep.  Equipment used at home: none.  DME owned (not currently used): none.  Upon discharge, patient will have assistance from spouse.    Objective:     Communicated with nursing prior to session.  Patient found HOB elevated with peripheral IV upon PT entry to room.    General Precautions: Standard,     Orthopedic Precautions:LUE non weight bearing, RUE non weight bearing   Braces:  (Bilat wrists casted)   Respiratory Status: Room air    Exams:  Cognitive Exam:  Patient is oriented to Person, Place, Time, and Situation  Sensation:    -       Intact  RLE ROM: WNL  RLE Strength: WNL  LLE ROM: WNL  LLE Strength: WNL    Functional Mobility:  Bed Mobility:     Supine to Sit: modified  independence  Transfers:     Sit to Stand:  modified independence with no AD  Gait: 25ft in room with SBA  Balance: Good-    AM-PAC 6 CLICK MOBILITY  Total Score:23       Treatment and Education:   PT evaluation completed with no further PT warranted.     AM-PAC 6 CLICK MOBILITY  Total Score:23     Patient left sitting edge of bed with  OTR present.    GOALS:   Multidisciplinary Problems       Physical Therapy Goals       Not on file              Multidisciplinary Problems (Resolved)          Problem: Physical Therapy    Goal Priority Disciplines Outcome Goal Variances Interventions   Physical Therapy Goal   (Resolved)     PT, PT/OT Met     Description: PT evaluation completed with no further PT warranted at this time.                        History:     Past Medical History:   Diagnosis Date    Allergic rhinitis, mild     Anemia     Anxiety     Degenerative disc disease, cervical     Depression     GERD (gastroesophageal reflux disease)     Hyperlipidemia     Hypertension     Insomnia     Migraine     Vitamin D deficiency        Past Surgical History:   Procedure Laterality Date    ANGIOGRAM, CORONARY, WITH LEFT HEART CATHETERIZATION Left 9/17/2021    Procedure: Left heart cath w/ coronary angiograms;  Surgeon: Demi Carias MD;  Location: [facility] CATH LAB;  Service: Cardiology;  Laterality: Left;    INGUINAL HERNIA REPAIR Right 1968    LAPAROSCOPIC CHOLECYSTECTOMY WITH CHOLANGIOGRAPHY N/A 2/8/2022    Procedure: CHOLECYSTECTOMY, LAPAROSCOPIC, WITH CHOLANGIOGRAM;  Surgeon: Steve Florez MD;  Location: [facility] OR;  Service: General;  Laterality: N/A;    LEFT HEART CATHETERIZATION Left 2/10/2022    Procedure: Left heart cath;  Surgeon: Francisco Rosenbaum MD;  Location: [facility] CATH LAB;  Service: Cardiology;  Laterality: Left;       Time Tracking:     PT Received On: 11/13/22  PT Start Time: 1100     PT Stop Time: 1108  PT Total Time (min): 8 min     Billable Minutes: Evaluation low  complexity      11/13/2022

## 2022-11-13 NOTE — ED NOTES
Provider asked if splint should be applied and provider didn't feel splint was needed at this time because patient was going to surgery

## 2022-11-13 NOTE — PLAN OF CARE
Problem: Occupational Therapy  Goal: Occupational Therapy Goal  Description: OT evaluation completed. Pt is an eval only.Pt presents with (B) wrist fractures. Pt was educated on dressing and demonstrated understanding. Pt is being d/c home with .  Outcome: Met

## 2022-11-13 NOTE — OP NOTE
[facility] - Orthopedic Periop Services  Operative Note    Surgery Date: 11/12/2022      Surgeon(s) and Role:     * Maverick Zepeda MD - Primary    Assistant: Isidoro Weiss    Pre-op Diagnosis:     Right Extra-articular distal radius fracture  Left intra-articular distal radius fracture  Post-op Diagnosis:    same  Procedure:    Open reduction internal fixation of right extra-articular distal radius fracture  Closed reduction and casting of left intra-articular distal radius fracture    Anesthesia:  General/MAC     EBL:  2 mL     Implants: * No implants in log *    Tourniquet time: 25 mins    Complication:   none    Procedure:          DESCRIPTION OF PROCEDURE:  The patient was taken to the operating room and placed supine.  Anesthesia was administered and pre-operative antibiotics were given.  A timeout was performed.  Patient was positioned appropriately and prepped and draped in the usual sterile fashion.      A timeout was undertaken to confirm patient, site, surgery and surgeon. All agreed and we proceeded. Arm was exsanguinated and tourniquet was raised to 250 mmHg.  A standard volar flexor carpi radialis approach was made to skin and subcutaneous tissues. The fascia over the FCR was incised and then the floor of the tendon was also incised. The pronator quadratus was incised from the radial edge of the border and taken in an L-shaped fashion in the distal radius. Fracture site was identified and a manual reduction maneuver was performed.     Description of fracture:  Comminuted metaphyseal fracture with no involvement of the articular surface on the right side.  Nondisplaced intra-articular fracture seen on the left distal radius via fluoroscopic imaging    The Synthes distal radial plate was placed on the shaft of the bone and affixed to bone with a wire both distally and proximally.  Satisfactory position was confirmed with C-arm. The screw was placed in the oblong hole in the shaft and the K-wire and tower were  removed. Cortical screws were then placed distally in appropriate fashion subchondrally.  Locking screws were utilized thereafter in order to create a fixed angle construct, and cortical screws were eventually replaced with locking screws to prevent screw head impingement dorsally.       The radial styloid screws were then placed. Cortical screws were then placed within the proximal segment as well. The entire construct was visualized under fluoroscopy with anatomical reduction of the distal radius, good placement of the screws underneath the joint line and all screws were shown to be extraarticular by the 20 degree view. Tourniquet was let down and hemostasis obtained with electrocautery. The subcutaneous tissues were closed with 4-0 Vicryl suture and the skin with 4-0 nylon suture. A sterile dressing was applied followed by a short-arm volar splint. All instrument and sponge counts were reported correct at the end of the case.    Attention was then turned towards left distal radius.  It appeared to be reasonably well reduced and a short-arm cast was applied three-point mold was also applied to confirm satisfactory anatomic reduction of the distal radius fracture.       There were no complications. The patient was extubated, awakened and taken to Recovery in stable condition.    As the attending surgeon I was physically present for the key/critical portions of the procedure.    Disposition:  Home.  Pain med prescribed. she is to be non weight bearing to the affected arm and to keep her dressing/splint clean dry and intact until follow up.

## 2022-11-13 NOTE — ANESTHESIA PROCEDURE NOTES
Intubation    Date/Time: 11/12/2022 6:49 PM  Performed by: Srinath Solares CRNA  Authorized by: Vern Brewster     Intubation:     Induction:  Intravenous    Intubated:  Postinduction    Mask Ventilation:  Easy mask    Attempts:  1    Attempted By:  CRNA    Method of Intubation:  Direct    Blade:  Krupa 3    Laryngeal View Grade: Grade I - full view of cords      Difficult Airway Encountered?: No      Complications:  None    Airway Device:  Oral endotracheal tube    Airway Device Size:  7.0    Style/Cuff Inflation:  Cuffed (inflated to minimal occlusive pressure)    Tube secured:  22    Secured at:  The lips    Placement Verified By:  Capnometry and Revisualization with laryngoscopy    Complicating Factors:  None    Findings Post-Intubation:  BS equal bilateral and atraumatic/condition of teeth unchanged

## 2022-11-13 NOTE — PT/OT/SLP EVAL
Occupational Therapy   Evaluation    Name: Radha Crews  MRN: [id number]  Admitting Diagnosis:  Closed Chance's fracture of right radius  Recent Surgery: Procedure(s) (LRB):  ORIF, WRIST (Right)  CLOSED REDUCTION, FRACTURE, WITH CAST APPLICATION (Left) 1 Day Post-Op    Recommendations:     Discharge Recommendations: home  Discharge Equipment Recommendations:  none  Barriers to discharge:       Assessment:     Radha Crews is a 62 y.o. female with a medical diagnosis of Closed Chance's fracture of right radius.  She presents with complaint of wrist pain.Pt agreed to OT evaluation. Performance deficits affecting function: impaired self care skills, pain, decreased upper extremity function.      Rehab Prognosis: Good; patient would benefit from acute skilled OT services to address these deficits and reach maximum level of function.       Plan:     Patient to be seen  (Eval only) to address the above listed problems via  (Eval only)  Plan of Care Expires:    Plan of Care Reviewed with: patient, spouse    Subjective     Chief Complaint: Open reduction internal fixation of right extra-articular distal radius fracture  Closed reduction and casting of left intra-articular distal radius fracture  Patient/Family Comments/goals: To return home    Occupational Profile:  Living Environment: Pt lives with  in single level home with 5 steps and rail.  Previous level of function: I with self care prior  Roles and Routines: I with daily activities  Equipment Used at Home:  none  Assistance upon Discharge:     Pain/Comfort:  Pain Rating 1: 6/10  Location - Side 1: Bilateral  Location 1: wrist  Pain Addressed 1: Reposition, Distraction  Pain Rating Post-Intervention 1: 6/10    Patients cultural, spiritual, Jainism conflicts given the current situation: no    Objective:     Communicated with: TEODORO Chu prior to session.  Patient found HOB elevated with peripheral IV upon OT entry to room.    General  Precautions: Standard, fall   Orthopedic Precautions:LUE non weight bearing, RUE non weight bearing   Braces: N/A  Respiratory Status: Room air    Occupational Performance:    Bed Mobility:    Patient completed Supine to Sit with stand by assistance    Functional Mobility/Transfers:  Patient completed Sit <> Stand Transfer with stand by assistance  with  no assistive device   Functional Mobility: SBA/CGA    Activities of Daily Living:  Upper Body Dressing: moderate assistance donning button down shirt  Lower Body Dressing: moderate assistance pt able to davis pants over feet, but was unable to button and zip    Cognitive/Visual Perceptual:  Cognitive/Psychosocial Skills:     -       Oriented to: Person, Place, Time, and Situation   -       Follows Commands/attention:Follows one-step commands  -       Communication: clear/fluent  Visual/Perceptual:      -Intact wears glasses. Hearing wfl    Physical Exam:  (B) shoulder and elbow ROM/Strength wfl. (B) wrist NT due to fractures and cast.  (R) handed    AMPAC 6 Click ADL:  AMPAC Total Score: 14    Treatment & Education:  OT evaluation completed. See eval for details.  Pt educated to wear over sized shirts and elastic waist pant for easier donning and doffing.    Patient left HOB elevated with all lines intact, call button in reach, and  present    GOALS:   Multidisciplinary Problems       Occupational Therapy Goals       Not on file              Multidisciplinary Problems (Resolved)          Problem: Occupational Therapy    Goal Priority Disciplines Outcome Interventions   Occupational Therapy Goal   (Resolved)     OT, PT/OT Met    Description: OT evaluation completed. Pt is an eval only.Pt presents with (B) wrist fractures. Pt was educated on dressing and demonstrated understanding. Pt is being d/c home with .                       History:     Past Medical History:   Diagnosis Date    Allergic rhinitis, mild     Anemia     Anxiety     Degenerative disc  disease, cervical     Depression     GERD (gastroesophageal reflux disease)     Hyperlipidemia     Hypertension     Insomnia     Migraine     Vitamin D deficiency          Past Surgical History:   Procedure Laterality Date    ANGIOGRAM, CORONARY, WITH LEFT HEART CATHETERIZATION Left 9/17/2021    Procedure: Left heart cath w/ coronary angiograms;  Surgeon: Demi Carias MD;  Location: [facility] CATH LAB;  Service: Cardiology;  Laterality: Left;    INGUINAL HERNIA REPAIR Right 1968    LAPAROSCOPIC CHOLECYSTECTOMY WITH CHOLANGIOGRAPHY N/A 2/8/2022    Procedure: CHOLECYSTECTOMY, LAPAROSCOPIC, WITH CHOLANGIOGRAM;  Surgeon: Steve Florez MD;  Location: [facility] OR;  Service: General;  Laterality: N/A;    LEFT HEART CATHETERIZATION Left 2/10/2022    Procedure: Left heart cath;  Surgeon: Francisco Rosenbaum MD;  Location: [facility] CATH LAB;  Service: Cardiology;  Laterality: Left;       Time Tracking:     OT Date of Treatment: 11/13/22  OT Start Time: 1100  OT Stop Time: 1115  OT Total Time (min): 15 min    Billable Minutes:Evaluation 15    11/13/2022

## 2022-11-13 NOTE — TRANSFER OF CARE
"Anesthesia Transfer of Care Note    Patient: Radha Crews    Procedure(s) Performed: Procedure(s) (LRB):  ORIF, WRIST (Bilateral)    Patient location: PACU    Anesthesia Type: general    Transport from OR: Transported from OR on 6-10 L/min O2 by face mask with adequate spontaneous ventilation    Post pain: adequate analgesia    Post assessment: no apparent anesthetic complications    Post vital signs: stable    Level of consciousness: responds to stimulation, awake and sedated    Nausea/Vomiting: no nausea/vomiting    Complications: none    Transfer of care protocol was followed      Last vitals:   Visit Vitals  /83 (BP Location: Right arm, Patient Position: Lying)   Pulse 109   Temp 36.4 °C (97.6 °F) (Oral)   Resp 13   Ht 5' 3" (1.6 m)   Wt 77.6 kg (171 lb)   LMP  (LMP Unknown)   SpO2 96%   Breastfeeding No   BMI 30.29 kg/m²     "

## 2022-11-13 NOTE — PLAN OF CARE
Problem: Physical Therapy  Goal: Physical Therapy Goal  Description: PT evaluation completed with no further PT warranted at this time.   Outcome: Met

## 2022-11-13 NOTE — PLAN OF CARE
Consulted for DC planning, this pt is OBS and out of state insurance. Spoke with RN this day, no exact needs known at this time, and no PT/OT available. Advised nurse to let PT evaluate pt and if needs are noted to re consult CM/SS. Will follow if consulted for dc needs.

## 2022-11-13 NOTE — ANESTHESIA POSTPROCEDURE EVALUATION
Anesthesia Post Evaluation    Patient: Radha Crews    Procedure(s) Performed: Procedure(s) (LRB):  ORIF, WRIST (Right)  CLOSED REDUCTION, FRACTURE, WITH CAST APPLICATION (Left)    Final Anesthesia Type: general      Patient location during evaluation: PACU  Patient participation: Yes- Able to Participate  Level of consciousness: awake and alert and oriented  Post-procedure vital signs: reviewed and stable  Pain management: adequate  Airway patency: patent  ALTA mitigation strategies: Multimodal analgesia  PONV status at discharge: No PONV  Anesthetic complications: no      Cardiovascular status: hemodynamically stable  Respiratory status: unassisted and spontaneous ventilation  Hydration status: euvolemic  Follow-up not needed.          Vitals Value Taken Time   /64 11/12/22 2004   Temp 36.4 °C (97.6 °F) 11/12/22 1956   Pulse 114 11/12/22 2007   Resp 13 11/12/22 2007   SpO2 98 % 11/12/22 2007   Vitals shown include unvalidated device data.      No case tracking events are documented in the log.      Pain/Lillian Score: Pain Rating Prior to Med Admin: 6 (11/12/2022  4:11 PM)  Lillian Score: 8 (11/12/2022  8:01 PM)

## 2022-11-13 NOTE — H&P
[facility] Emergency Department  Orthopedics  H&P    Patient Name: Radha Crews  MRN: [id number]  Admission Date: 11/12/2022  Primary Care Provider: Eric Benavides DO    Patient information was obtained from patient, spouse/SO, and ER records.     Subjective:     Principal Problem:Closed Chance's fracture of right radius    Chief Complaint:   Chief Complaint   Patient presents with    Fall     Bucked off horse        HPI:  62-year-old female fell while riding horses today.  Sustained bilateral distal radius fractures.  Was evaluated the emergency department.  Denies any other associated injuries.    Past Medical History:   Diagnosis Date    Allergic rhinitis, mild     Anemia     Anxiety     Degenerative disc disease, cervical     Depression     GERD (gastroesophageal reflux disease)     Hyperlipidemia     Hypertension     Insomnia     Migraine     Vitamin D deficiency        Past Surgical History:   Procedure Laterality Date    ANGIOGRAM, CORONARY, WITH LEFT HEART CATHETERIZATION Left 9/17/2021    Procedure: Left heart cath w/ coronary angiograms;  Surgeon: Demi Carias MD;  Location: [facility] CATH LAB;  Service: Cardiology;  Laterality: Left;    INGUINAL HERNIA REPAIR Right 1968    LAPAROSCOPIC CHOLECYSTECTOMY WITH CHOLANGIOGRAPHY N/A 2/8/2022    Procedure: CHOLECYSTECTOMY, LAPAROSCOPIC, WITH CHOLANGIOGRAM;  Surgeon: Steve Florez MD;  Location: [facility] OR;  Service: General;  Laterality: N/A;    LEFT HEART CATHETERIZATION Left 2/10/2022    Procedure: Left heart cath;  Surgeon: Francisco Rosenbaum MD;  Location: [facility] CATH LAB;  Service: Cardiology;  Laterality: Left;       Review of patient's allergies indicates:  No Known Allergies    No current facility-administered medications for this encounter.     Current Outpatient Medications   Medication Sig    EScitalopram oxalate (LEXAPRO) 10 MG tablet Take 1 tablet (10 mg total) by mouth once daily.    hydrOXYzine pamoate (VISTARIL) 25 MG  Cap Take 1 capsule (25 mg total) by mouth 2 (two) times daily as needed (for anxiety).    omeprazole (PRILOSEC) 20 MG capsule Take 1 capsule (20 mg total) by mouth once daily.    sumatriptan (IMITREX) 100 MG tablet Take 1 tablet (100 mg total) by mouth every 2 (two) hours as needed for Migraine (do not take over 200 mg in 24 hrs). 1 tablet by mouth at onset of headache,may repeat once in 24 hours if no relief.    albuterol (PROVENTIL/VENTOLIN HFA) 90 mcg/actuation inhaler Inhale 2 puffs into the lungs every 6 (six) hours as needed for Wheezing. Rescue    apixaban (ELIQUIS) 5 mg Tab Take 1 tablet (5 mg total) by mouth 2 (two) times daily.    azithromycin (ZITHROMAX Z-YASMEEN) 250 MG tablet Take 2 tabs by mouth today then 1 tab daily x 4 days    budesonide-glycopyr-formoterol (BREZTRI AEROSPHERE) 160-9-4.8 mcg/actuation HFAA Inhale 2 puffs into the lungs once daily.    celecoxib (CELEBREX) 200 MG capsule Take 1 capsule (200 mg total) by mouth once daily. (Patient taking differently: Take by mouth once daily.)    cetirizine (ZYRTEC) 10 MG tablet Take 1 tablet (10 mg total) by mouth once daily. (Patient not taking: Reported on 5/25/2022)    ciprofloxacin HCl (CIPRO) 500 MG tablet Take 1 tablet (500 mg total) by mouth every 12 (twelve) hours. (Patient not taking: No sig reported)    fluticasone propionate (FLONASE) 50 mcg/actuation nasal spray 1 spray (50 mcg total) by Each Nostril route 2 (two) times a day.    hydroCHLOROthiazide (HYDRODIURIL) 12.5 MG Tab Take 12.5 mg by mouth once daily.    HYDROcodone-acetaminophen (NORCO) 7.5-325 mg per tablet Take 1 tablet by mouth every 6 (six) hours as needed for Pain. (Patient not taking: No sig reported)    HYDROcodone-acetaminophen (NORCO) 7.5-325 mg per tablet Take 1 tablet by mouth every 6 (six) hours as needed for Pain. (Patient not taking: No sig reported)    iron fum-B12-IF-C-folic acid (FOLTRIN) 110-0.5 mg capsule Take 1 capsule by mouth 2 (two) times daily.    ondansetron  "(ZOFRAN-ODT) 4 MG TbDL Take 1 tablet (4 mg total) by mouth 2 (two) times daily. (Patient not taking: Reported on 2022)    pantoprazole (PROTONIX) 40 MG tablet Take 1 tablet (40 mg total) by mouth once daily. (Patient not taking: Reported on 2022)    tiZANidine (ZANAFLEX) 4 MG tablet Take 1 tablet (4 mg total) by mouth nightly.     Family History       Problem Relation (Age of Onset)    Arthritis Other    Cancer Father    Diabetes Other    Hypertension Father          Tobacco Use    Smoking status: Former     Types: Cigarettes     Quit date: 10/2009     Years since quittin.1    Smokeless tobacco: Never   Substance and Sexual Activity    Alcohol use: Never    Drug use: Never    Sexual activity: Yes     Partners: Male     Birth control/protection: Post-menopausal     ROS  Objective:     Vital Signs (Most Recent):  Temp: 97.8 °F (36.6 °C) (22 1500)  Pulse: 64 (22 1500)  Resp: 18 (22 1611)  BP: 113/67 (22 1500)  SpO2: (!) 94 % (22 1500)   Vital Signs (24h Range):  Temp:  [97.8 °F (36.6 °C)] 97.8 °F (36.6 °C)  Pulse:  [64] 64  Resp:  [18-20] 18  SpO2:  [94 %] 94 %  BP: (113)/(67) 113/67     Weight: 77.6 kg (171 lb)  Height: 5' 3" (160 cm)  Body mass index is 30.29 kg/m².    No intake or output data in the 24 hours ending 22 1836    General    Nursing note and vitals reviewed.  Constitutional: She is oriented to person, place, and time. She appears well-developed and well-nourished.   HENT:   Head: Normocephalic and atraumatic.   Nose: Nose normal.   Eyes: EOM are normal. Pupils are equal, round, and reactive to light.   Neck: Neck supple.   Cardiovascular:  Normal rate, regular rhythm and intact distal pulses.            Pulmonary/Chest: Effort normal. No respiratory distress. She exhibits no tenderness.   Abdominal: Soft. She exhibits no distension. There is no abdominal tenderness. There is no rebound.   Neurological: She is alert and oriented to person, place, and " time. She has normal reflexes.   Psychiatric: She has a normal mood and affect. Her behavior is normal. Judgment and thought content normal.             Right Hand/Wrist Exam     Inspection   Effusion: Wrist - present Hand -  present  Bruising: Wrist - present Hand -  present  Deformity: Wrist - deformity     Range of Motion     Wrist   Extension:  abnormal   Flexion:  abnormal   Pronation:  abnormal   Supination:  abnormal     Tests   Carpal Tunnel Compression Test: negative  Cubital Tunnel Compression Test: negative    Atrophy   Thenar:  negative  Hypothenar:  negative  Intrinsic:  negative  1st Dorsal Interosseous: negative    Other     Neuorologic Exam    Median Distribution: normal  Ulnar Distribution: normal  Radial Distribution: normal      Left Hand/Wrist Exam   Left hand exam is normal.    Range of Motion     Wrist   Extension:  abnormal   Flexion:  abnormal   Abduction: abnormal  Adduction: abnormal    Tests   Flexor Digitorum Superficialis Test: normal  Flexor Digitorum Profundus Test: normal            Muscle Strength   Right Upper Extremity   Wrist extension: 4/5   Wrist flexion: 4/5   : 3/5   Left Upper Extremity  Wrist extension: 2/5   Wrist flexion: 2/5   :  3/5     Vascular Exam       Capillary Refill  Right Hand: normal capillary refill        Significant Labs:   Recent Lab Results         11/12/22  1758   11/12/22  1717        Anion Gap   16       Baso #   0.03       Basophil %   0.3       BUN   18       BUN/CREAT RATIO   14       Calcium   8.6       Chloride   105       CO2   22       Creatinine   1.26       Differential Type   Scan Smear       eGFR   49       Eos #   0.03       Eosinophil %   0.3       Glucose   138       Hematocrit   42.8       Hemoglobin   14.4       Immature Grans (Abs)   0.04       Immature Granulocytes   0.4       Lymph #   0.87       Lymph %   8.7       MCH   28.3       MCHC   33.6       MCV   84.1       Mono #   0.54       Mono %   5.4       MPV   9.9        Neutrophils, Abs   8.46       Neutrophils Relative   84.9       nRBC   0.0       NUCLEATED RBC ABSOLUTE   0.00       Platelets   213       POC Glucose 136         Potassium   4.1       RBC   5.09       RDW   14.0       Sodium   139       WBC   9.97             All pertinent labs within the past 24 hours have been reviewed.    Significant Imaging:     X-Ray Wrist Complete Bilateral    Result Date: 11/12/2022  EXAMINATION: XR WRIST COMPLETE 3 VIEWS BILATERAL CLINICAL HISTORY: wrist injury;.  Unspecified fall, initial encounter COMPARISON: Left wrist x-ray January 3, 2019 TECHNIQUE: AP, lateral, and oblique views of either wrist.  Six total views FINDINGS: There is a minimally displaced acute comminuted intra-articular fracture of the distal left radial metaphysis and epiphysis with relatively good alignment. There is an acute comminuted intra-articular fracture distal metaphysis and epiphysis of the right radius with mild dorsal displacement of the main distal fracture fragment and moderate dorsal distal angulation.  There is likely an element of impaction present.  There is asymmetric soft tissue swelling, right wrist more so than left     Acute comminuted intra-articular fractures of either distal radius as detailed above Electronically signed by: Chapin Mancini Date:    11/12/2022 Time:    15:28    CT Wrist Without Contrast Right    Result Date: 11/12/2022  EXAMINATION: CT WRIST WITHOUT CONTRAST RIGHT CLINICAL HISTORY: Wrist fracture COMPARISON: Right wrist x-ray TECHNIQUE: Spiral CT sections were obtained through the right wrist without IV contrast.  Sagittal and coronal multiplanar reconstruction images are also examined. The CT examination was performed using one or more of the following dose reduction techniques: Automated exposure control, adjustment of the mA and kV according to patient's size, use of acute or iterative reconstruction techniques. FINDINGS: There is an acute comminuted fracture of the distal  metaphysis of the right radius with mild dorsal displacement of the main distal fracture fragment and moderate dorsal distal angulation.  There is moderate impaction at the fracture site.  Contrary to the x-ray report, there does not appear to be a definite intra-articular component of this fracture.  No additional fracture is seen.  There is a benign appearing 8 mm geographic lytic lesion in the lunate which presumably represents enchondroma     Acute comminuted fracture distal metaphysis right radius Electronically signed by: Chapin Mancini Date:    11/12/2022 Time:    17:13       Assessment/Plan:     Active Diagnoses:    Diagnosis Date Noted POA    PRINCIPAL PROBLEM:  Closed Chance's fracture of right radius [S52.561A] 11/12/2022 Yes    Closed Chance's fracture of left radius with routine healing [S52.562D] 11/12/2022 Not Applicable      Problems Resolved During this Admission:     Will plan for open reduction internal fixation of right distal radius fracture.  Will plan for possible open reduction internal fixation of the left distal radius with potential closed reduction as well.  Risks benefits alternatives were discussed.  Patient voiced understanding.  Will proceed with surgery joselyn Zepeda MD  Orthopedics  [facility] - Emergency Department

## 2022-11-14 LAB
GROUP & RH: NORMAL
INDIRECT COOMBS GEL: NORMAL

## 2022-11-15 ENCOUNTER — TELEPHONE (OUTPATIENT)
Dept: ORTHOPEDICS | Facility: CLINIC | Age: 62
End: 2022-11-15
Payer: COMMERCIAL

## 2022-11-15 NOTE — TELEPHONE ENCOUNTER
----- Message from Macarena Mendoza sent at 11/15/2022  9:19 AM CST -----  Pt had surgery on 11/12 needing an excuse faxed to her job stating how long she will be out of work -     Fax # [phone]

## 2022-11-16 NOTE — DISCHARGE SUMMARY
[facility] - Orthopedic  Discharge Summary      Admit Date: 11/12/2022    Discharge Date and Time: 11/13/2022  1:05 PM    Attending Physician: Saniya att. providers found         Procedures Performed: Procedure(s) (LRB):  ORIF, WRIST (Right)  CLOSED REDUCTION, FRACTURE, WITH CAST APPLICATION (Left)    Hospital Course (synopsis of major diagnoses, care, treatment, and services provided during the course of the hospital stay): Radha Crews was admitted following the aforementioned surgical procedure.  she received perioperative antibiotics over 23 hours following the surgery and participated in post operative physical therapy.  she was made familiar with the post-operative rehabilitation process and the need for DVT prophylaxis was discussed prior to discharged.  Radha Crews was discharged safely having suffered no untoward events.        Goals of Care Treatment Preferences:  Code Status: Full Code        Final Diagnoses:    Principal Problem: Closed Chance's fracture of right radius   Secondary Diagnoses:   Active Hospital Problems    Diagnosis  POA    *Closed Chance's fracture of right radius [S52.561A]  Yes    Closed Chance's fracture of left radius with routine healing [S52.562D]  Not Applicable      Resolved Hospital Problems   No resolved problems to display.       Discharged Condition: good    Disposition: Home or Self Care    Follow Up/Patient Instructions:     Medications:  Reconciled Home Medications:      Medication List        START taking these medications      * ondansetron 4 MG Tbdl  Commonly known as: ZOFRAN-ODT  Take 2 tablets (8 mg total) by mouth every 8 (eight) hours as needed.     oxyCODONE-acetaminophen 5-325 mg per tablet  Commonly known as: PERCOCET  Take 1 tablet by mouth every 6 (six) hours as needed for Pain.           * This list has 1 medication(s) that are the same as other medications prescribed for you. Read the directions carefully, and ask your doctor or other care  provider to review them with you.                CONTINUE taking these medications      albuterol 90 mcg/actuation inhaler  Commonly known as: PROVENTIL/VENTOLIN HFA  Inhale 2 puffs into the lungs every 6 (six) hours as needed for Wheezing. Rescue     BREZTRI AEROSPHERE 160-9-4.8 mcg/actuation Hfaa  Generic drug: budesonide-glycopyr-formoterol  Inhale 2 puffs into the lungs once daily.     EScitalopram oxalate 10 MG tablet  Commonly known as: LEXAPRO  Take 1 tablet (10 mg total) by mouth once daily.     fluticasone propionate 50 mcg/actuation nasal spray  Commonly known as: FLONASE  1 spray (50 mcg total) by Each Nostril route 2 (two) times a day.     hydroCHLOROthiazide 12.5 MG Tab  Commonly known as: HYDRODIURIL  Take 12.5 mg by mouth once daily.     hydrOXYzine pamoate 25 MG Cap  Commonly known as: VISTARIL  Take 1 capsule (25 mg total) by mouth 2 (two) times daily as needed (for anxiety).     omeprazole 20 MG capsule  Commonly known as: PRILOSEC  Take 1 capsule (20 mg total) by mouth once daily.     sumatriptan 100 MG tablet  Commonly known as: IMITREX  Take 1 tablet (100 mg total) by mouth every 2 (two) hours as needed for Migraine (do not take over 200 mg in 24 hrs). 1 tablet by mouth at onset of headache,may repeat once in 24 hours if no relief.     tiZANidine 4 MG tablet  Commonly known as: ZANAFLEX  Take 1 tablet (4 mg total) by mouth nightly.            STOP taking these medications      apixaban 5 mg Tab  Commonly known as: ELIQUIS     azithromycin 250 MG tablet  Commonly known as: ZITHROMAX Z-YASMEEN     HYDROcodone-acetaminophen 7.5-325 mg per tablet  Commonly known as: NORCO     iron fum-B12-IF-C-folic acid 110-0.5 mg capsule  Commonly known as: FOLTRIN            ASK your doctor about these medications      celecoxib 200 MG capsule  Commonly known as: CeleBREX  Take 1 capsule (200 mg total) by mouth once daily.     cetirizine 10 MG tablet  Commonly known as: ZYRTEC  Take 1 tablet (10 mg total) by mouth  once daily.     ciprofloxacin HCl 500 MG tablet  Commonly known as: CIPRO  Take 1 tablet (500 mg total) by mouth every 12 (twelve) hours.     * ondansetron 4 MG Tbdl  Commonly known as: ZOFRAN-ODT  Take 1 tablet (4 mg total) by mouth 2 (two) times daily.     pantoprazole 40 MG tablet  Commonly known as: PROTONIX  Take 1 tablet (40 mg total) by mouth once daily.           * This list has 1 medication(s) that are the same as other medications prescribed for you. Read the directions carefully, and ask your doctor or other care provider to review them with you.                Discharge Procedure Orders   Diet general     Keep surgical extremity elevated     Leave dressing on - Keep it clean, dry, and intact until clinic visit     Call MD for:  temperature >100.4     Call MD for:  persistent nausea and vomiting     Call MD for:  severe uncontrolled pain     Call MD for:  difficulty breathing, headache or visual disturbances     Call MD for:  redness, tenderness, or signs of infection (pain, swelling, redness, odor or green/yellow discharge around incision site)     Call MD for:  hives     Call MD for:  persistent dizziness or light-headedness     Call MD for:  extreme fatigue      Follow-up Information       Maverick Zepeda MD Follow up.    Specialty: Orthopedic Surgery  Why: 1-2 weeks F/U  Contact information:  [address] [place] [address] [phone]

## 2022-11-22 ENCOUNTER — TELEPHONE (OUTPATIENT)
Dept: ORTHOPEDICS | Facility: CLINIC | Age: 62
End: 2022-11-22
Payer: COMMERCIAL

## 2022-11-22 NOTE — TELEPHONE ENCOUNTER
----- Message from Macarena Mendoza sent at 11/16/2022  1:46 PM CST -----  Arpita from [employer] calling to get information on type of surgery patient had - call back # [phone]     Fax # [phone]    Claim # [id number]

## 2022-11-23 DIAGNOSIS — S62.101D CLOSED FRACTURE OF BOTH WRISTS WITH ROUTINE HEALING, SUBSEQUENT ENCOUNTER: ICD-10-CM

## 2022-11-23 DIAGNOSIS — S52.579A OTHER CLOSED INTRA-ARTICULAR FRACTURE OF DISTAL END OF RADIUS, UNSPECIFIED LATERALITY, INITIAL ENCOUNTER: Primary | ICD-10-CM

## 2022-11-23 DIAGNOSIS — S62.101A CLOSED FRACTURE OF BOTH WRISTS, INITIAL ENCOUNTER: ICD-10-CM

## 2022-11-23 DIAGNOSIS — S62.102D CLOSED FRACTURE OF BOTH WRISTS WITH ROUTINE HEALING, SUBSEQUENT ENCOUNTER: ICD-10-CM

## 2022-11-23 DIAGNOSIS — S62.102A CLOSED FRACTURE OF BOTH WRISTS, INITIAL ENCOUNTER: ICD-10-CM

## 2022-11-29 ENCOUNTER — HOSPITAL ENCOUNTER (OUTPATIENT)
Dept: RADIOLOGY | Facility: HOSPITAL | Age: 62
Discharge: HOME | End: 2022-11-29
Attending: NURSE PRACTITIONER
Payer: COMMERCIAL

## 2022-11-29 ENCOUNTER — OFFICE VISIT (OUTPATIENT)
Dept: ORTHOPEDICS | Facility: CLINIC | Age: 62
End: 2022-11-29
Payer: COMMERCIAL

## 2022-11-29 DIAGNOSIS — S52.579D: Primary | ICD-10-CM

## 2022-11-29 DIAGNOSIS — S62.101D CLOSED FRACTURE OF BOTH WRISTS WITH ROUTINE HEALING, SUBSEQUENT ENCOUNTER: ICD-10-CM

## 2022-11-29 DIAGNOSIS — S62.102D CLOSED FRACTURE OF BOTH WRISTS WITH ROUTINE HEALING, SUBSEQUENT ENCOUNTER: ICD-10-CM

## 2022-11-29 PROCEDURE — 73100 X-RAY EXAM OF WRIST: CPT | Mod: 26,LT,, | Performed by: RADIOLOGY

## 2022-11-29 PROCEDURE — 73100 XR WRIST COMPLETE 3 VIEWS BILATERAL: ICD-10-PCS | Mod: 26,LT,, | Performed by: RADIOLOGY

## 2022-11-29 PROCEDURE — 73110 X-RAY EXAM OF WRIST: CPT | Mod: TC,50

## 2022-11-29 PROCEDURE — 1159F MED LIST DOCD IN RCRD: CPT | Mod: ,,, | Performed by: NURSE PRACTITIONER

## 2022-11-29 PROCEDURE — 99024 POSTOP FOLLOW-UP VISIT: CPT | Mod: ,,, | Performed by: NURSE PRACTITIONER

## 2022-11-29 PROCEDURE — 1159F PR MEDICATION LIST DOCUMENTED IN MEDICAL RECORD: ICD-10-PCS | Mod: ,,, | Performed by: NURSE PRACTITIONER

## 2022-11-29 PROCEDURE — 73100 X-RAY EXAM OF WRIST: CPT | Mod: 26,RT,, | Performed by: RADIOLOGY

## 2022-11-29 PROCEDURE — 99024 PR POST-OP FOLLOW-UP VISIT: ICD-10-PCS | Mod: ,,, | Performed by: NURSE PRACTITIONER

## 2022-11-29 RX ORDER — OXYCODONE AND ACETAMINOPHEN 5; 325 MG/1; MG/1
1 TABLET ORAL EVERY 6 HOURS PRN
Qty: 30 TABLET | Refills: 0 | Status: SHIPPED | OUTPATIENT
Start: 2022-11-29 | End: 2024-04-02

## 2022-11-29 NOTE — PROGRESS NOTES
HISTORY OF PRESENT ILLNESS:       No surgery found No surgery found      Pt is here today for First post-operative followup of her No surgery found.        she is doing well.  She reports the pain has not been too bad.   She does complain of pain in her chest. She feels she pulled a muscle trying to get up without using her hands/arms.  She has short arm cast to left arm. Cast removed from right wrist. Sutures removed and steri strips applied. She is requesting a brace on the right so she can do self care. She is sp post CR on the left and ORIF right wrist.     We have reviewed her findings and discussed plan of care and future treatment options, including the physical therapy plan.                                                                                     PHYSICAL EXAMINATION:     Incision sites healed well  No evidence of any erythema, infection or induration  Minimal effusion  2+ DP pulse                                                                                   ASSESSMENT:                                                                                                                                               1. Status post above, doing well.                                                                                                                               PLAN:       Wounds were treated today  DVT prophylaxis discussed  Therapy plan discussed in great detail today; all questions answered.       Continue cast LUE. Removable brace to right wrist. NWB on the right.    RTC in 4 weeks.                                                                                                                                           There are no Patient Instructions on file for this visit.

## 2022-11-30 ENCOUNTER — PATIENT MESSAGE (OUTPATIENT)
Dept: ORTHOPEDICS | Facility: CLINIC | Age: 62
End: 2022-11-30
Payer: COMMERCIAL

## 2022-12-28 DIAGNOSIS — S52.579D: Primary | ICD-10-CM

## 2023-01-03 ENCOUNTER — HOSPITAL ENCOUNTER (OUTPATIENT)
Dept: RADIOLOGY | Facility: HOSPITAL | Age: 63
Discharge: HOME | End: 2023-01-03
Attending: NURSE PRACTITIONER
Payer: COMMERCIAL

## 2023-01-03 ENCOUNTER — OFFICE VISIT (OUTPATIENT)
Dept: ORTHOPEDICS | Facility: CLINIC | Age: 63
End: 2023-01-03
Payer: COMMERCIAL

## 2023-01-03 DIAGNOSIS — S62.101D CLOSED FRACTURE OF BOTH WRISTS WITH ROUTINE HEALING, SUBSEQUENT ENCOUNTER: Primary | ICD-10-CM

## 2023-01-03 DIAGNOSIS — S62.102D CLOSED FRACTURE OF BOTH WRISTS WITH ROUTINE HEALING, SUBSEQUENT ENCOUNTER: Primary | ICD-10-CM

## 2023-01-03 DIAGNOSIS — S52.579D: ICD-10-CM

## 2023-01-03 PROCEDURE — 99212 OFFICE O/P EST SF 10 MIN: CPT | Mod: PBBFAC | Performed by: NURSE PRACTITIONER

## 2023-01-03 PROCEDURE — 73110 X-RAY EXAM OF WRIST: CPT | Mod: TC,50

## 2023-01-03 PROCEDURE — 73100 X-RAY EXAM OF WRIST: CPT | Mod: 26,RT,, | Performed by: RADIOLOGY

## 2023-01-03 PROCEDURE — 73100 PR  X-RAY WRIST 2 VW: ICD-10-PCS | Mod: 26,LT,, | Performed by: RADIOLOGY

## 2023-01-03 PROCEDURE — 99024 PR POST-OP FOLLOW-UP VISIT: ICD-10-PCS | Mod: ,,, | Performed by: NURSE PRACTITIONER

## 2023-01-03 PROCEDURE — 99024 POSTOP FOLLOW-UP VISIT: CPT | Mod: ,,, | Performed by: NURSE PRACTITIONER

## 2023-01-03 PROCEDURE — 73100 X-RAY EXAM OF WRIST: CPT | Mod: 26,LT,, | Performed by: RADIOLOGY

## 2023-01-03 NOTE — PROGRESS NOTES
63 y.o. Female returns to clinic for a follow up visit regarding     ICD-10-CM ICD-9-CM   1. Closed fracture of both wrists with routine healing, subsequent encounter  S62.101D V54.12    S62.102D         Patient is 7 weeks post op ORIF right wrist and left wrist fracture. She was treated in a cast on the left and a brace on the right. Cast removed today. Reports she is not having pain, just stiffness. She does have some very mild pain when twisting her wrsit.        Past Medical History:   Diagnosis Date    Allergic rhinitis, mild     Anemia     Anxiety     Degenerative disc disease, cervical     Depression     GERD (gastroesophageal reflux disease)     Hyperlipidemia     Hypertension     Insomnia     Migraine     Vitamin D deficiency      Past Surgical History:   Procedure Laterality Date    ANGIOGRAM, CORONARY, WITH LEFT HEART CATHETERIZATION Left 9/17/2021    Procedure: Left heart cath w/ coronary angiograms;  Surgeon: Demi Carias MD;  Location: [facility] CATH LAB;  Service: Cardiology;  Laterality: Left;    INGUINAL HERNIA REPAIR Right 1968    LAPAROSCOPIC CHOLECYSTECTOMY WITH CHOLANGIOGRAPHY N/A 2/8/2022    Procedure: CHOLECYSTECTOMY, LAPAROSCOPIC, WITH CHOLANGIOGRAM;  Surgeon: Steve Florez MD;  Location: [facility] OR;  Service: General;  Laterality: N/A;    LEFT HEART CATHETERIZATION Left 2/10/2022    Procedure: Left heart cath;  Surgeon: Francisco Rosenbaum MD;  Location: [facility] CATH LAB;  Service: Cardiology;  Laterality: Left;    OPEN REDUCTION AND INTERNAL FIXATION (ORIF) OF INJURY OF WRIST Right 11/12/2022    Procedure: ORIF, WRIST;  Surgeon: Maverick Zepeda MD;  Location: [facility] OR;  Service: Orthopedics;  Laterality: Right;         PHYSICAL EXAMINATION:    General    Constitutional: She is oriented to person, place, and time. She appears well-nourished.   HENT:   Head: Normocephalic and atraumatic.   Eyes: Pupils are equal, round, and reactive to light.   Neck: Neck supple.    Cardiovascular:  Normal rate and regular rhythm.            Pulmonary/Chest: Effort normal. No respiratory distress.   Abdominal: There is no abdominal tenderness. There is no guarding.   Neurological: She is alert and oriented to person, place, and time. She has normal reflexes.   Psychiatric: She has a normal mood and affect. Her behavior is normal. Judgment and thought content normal.             Right Hand/Wrist Exam     Inspection   Scars: Wrist - present   Effusion: Wrist - absent   Bruising: Wrist - absent     Tenderness   The patient is tender to palpation of the radial area and ulnar area.    Range of Motion     Wrist   Extension:  normal   Flexion:  normal   Pronation:  abnormal   Supination:  abnormal     Other     Neuorologic Exam    Ulnar Distribution: normal      Left Hand/Wrist Exam     Inspection   Scars: Wrist - absent   Effusion: Wrist - absent   Bruising: Wrist - absent     Tenderness   The patient is tender to palpation of the ulnar area and radial area.     Range of Motion     Wrist   Extension:  normal   Flexion:  normal   Pronation:  abnormal   Supination:  abnormal     Other     Sensory Exam  Ulnar Distribution: normal          Muscle Strength   Right Upper Extremity   : 4/5   Left Upper Extremity  :  4/5     Vascular Exam       Capillary Refill  Right Hand: normal capillary refill  Left Hand: normal capillary refill        IMAGING:  X-Ray Wrist Complete 3 views Bilateral    Result Date: 1/3/2023  EXAMINATION: XR WRIST COMPLETE 3 VIEWS BILATERAL CLINICAL HISTORY: Other intraarticular fracture of lower end of unspecified radius, subsequent encounter for closed fracture with routine healing COMPARISON: 29 November 2022 FINDINGS: Internal fixation right wrist fracture and left wrist fracture with external cast appears similar to previous x-ray.  There may be a small amount of increased healing, detail limited.     Fractures as described above. Electronically signed by: Noe Villegas  Date:    01/03/2023 Time:    08:54     ASSESSMENT:      ICD-10-CM ICD-9-CM   1. Closed fracture of both wrists with routine healing, subsequent encounter  S62.101D V54.12    S62.102D        PLAN:     -Findings and treatment options were discussed with the patient  -All questions answered    Cast removed today. Removable wrist brace to both wrist. Ok to go back to work light duty. No heavy lifting, weight limit under 5 lbs.   RTC in 4 weeks with xrays    There are no Patient Instructions on file for this visit.      No orders of the defined types were placed in this encounter.        Procedures

## 2023-01-03 NOTE — LETTER
January 3, 2023      [facility] Orth[facility]  [address] [facility] [address]  Phone: [phone]  Fax: [phone]       Patient: Radha Crews   YOB: 1960  Date of Visit: 01/03/2023    To Whom It May Concern:    Lanye Crews  was at [facility] on 01/03/2023. The patient may return to work/school on 1-9-2023 with no restrictions. If you have any questions or concerns, or if I can be of further assistance, please do not hesitate to contact me.    Sincerely,    CINDY David

## 2023-01-10 ENCOUNTER — PATIENT OUTREACH (OUTPATIENT)
Dept: ADMINISTRATIVE | Facility: HOSPITAL | Age: 63
End: 2023-01-10

## 2023-01-10 NOTE — PROGRESS NOTES
Non-compliant report chart audits for CERVICAL CANCER SCREENING. Chart review completed for HM test overdue (mammograms, Colonoscopies, pap smears, DM labs, and/or EYE EXAMs)      Care Everywhere and media, updates requested and reviewed.      [facility] and [facility] reviewed.      No documentation of cervical cancer screening

## 2023-01-30 DIAGNOSIS — S62.101D CLOSED FRACTURE OF BOTH WRISTS WITH ROUTINE HEALING, SUBSEQUENT ENCOUNTER: Primary | ICD-10-CM

## 2023-01-30 DIAGNOSIS — S62.102D CLOSED FRACTURE OF BOTH WRISTS WITH ROUTINE HEALING, SUBSEQUENT ENCOUNTER: Primary | ICD-10-CM

## 2023-01-31 ENCOUNTER — OFFICE VISIT (OUTPATIENT)
Dept: ORTHOPEDICS | Facility: CLINIC | Age: 63
End: 2023-01-31
Payer: COMMERCIAL

## 2023-01-31 ENCOUNTER — HOSPITAL ENCOUNTER (OUTPATIENT)
Dept: RADIOLOGY | Facility: HOSPITAL | Age: 63
Discharge: HOME | End: 2023-01-31
Attending: ORTHOPAEDIC SURGERY
Payer: COMMERCIAL

## 2023-01-31 DIAGNOSIS — S62.101D CLOSED FRACTURE OF BOTH WRISTS WITH ROUTINE HEALING, SUBSEQUENT ENCOUNTER: Primary | ICD-10-CM

## 2023-01-31 DIAGNOSIS — S62.101D CLOSED FRACTURE OF BOTH WRISTS WITH ROUTINE HEALING, SUBSEQUENT ENCOUNTER: ICD-10-CM

## 2023-01-31 DIAGNOSIS — S62.102D CLOSED FRACTURE OF BOTH WRISTS WITH ROUTINE HEALING, SUBSEQUENT ENCOUNTER: Primary | ICD-10-CM

## 2023-01-31 DIAGNOSIS — S62.102D CLOSED FRACTURE OF BOTH WRISTS WITH ROUTINE HEALING, SUBSEQUENT ENCOUNTER: ICD-10-CM

## 2023-01-31 PROCEDURE — 1159F MED LIST DOCD IN RCRD: CPT | Mod: ,,, | Performed by: ORTHOPAEDIC SURGERY

## 2023-01-31 PROCEDURE — 99024 PR POST-OP FOLLOW-UP VISIT: ICD-10-PCS | Mod: ,,, | Performed by: ORTHOPAEDIC SURGERY

## 2023-01-31 PROCEDURE — 99024 POSTOP FOLLOW-UP VISIT: CPT | Mod: ,,, | Performed by: ORTHOPAEDIC SURGERY

## 2023-01-31 PROCEDURE — 1159F PR MEDICATION LIST DOCUMENTED IN MEDICAL RECORD: ICD-10-PCS | Mod: ,,, | Performed by: ORTHOPAEDIC SURGERY

## 2023-01-31 PROCEDURE — 73110 X-RAY EXAM OF WRIST: CPT | Mod: TC,50

## 2023-01-31 PROCEDURE — 73110 X-RAY EXAM OF WRIST: CPT | Mod: 26,RT,, | Performed by: ORTHOPAEDIC SURGERY

## 2023-01-31 PROCEDURE — 99212 OFFICE O/P EST SF 10 MIN: CPT | Mod: PBBFAC | Performed by: ORTHOPAEDIC SURGERY

## 2023-01-31 PROCEDURE — 73110 X-RAY EXAM OF WRIST: CPT | Mod: 26,LT,, | Performed by: ORTHOPAEDIC SURGERY

## 2023-01-31 PROCEDURE — 1160F PR REVIEW ALL MEDS BY PRESCRIBER/CLIN PHARMACIST DOCUMENTED: ICD-10-PCS | Mod: ,,, | Performed by: ORTHOPAEDIC SURGERY

## 2023-01-31 PROCEDURE — 1160F RVW MEDS BY RX/DR IN RCRD: CPT | Mod: ,,, | Performed by: ORTHOPAEDIC SURGERY

## 2023-01-31 PROCEDURE — 73110 XR WRIST COMPLETE 3 VIEWS BILATERAL: ICD-10-PCS | Mod: 26,LT,, | Performed by: ORTHOPAEDIC SURGERY

## 2023-01-31 NOTE — PROGRESS NOTES
63 y.o. Female returns to clinic for a follow up visit regarding     ICD-10-CM ICD-9-CM   1. Closed fracture of both wrists with routine healing, subsequent encounter  S62.101D V54.12    S62.102D         Patient is doing well. She does have some stiffness in her fingers at this time.   She is currently wearing wrist braces bilaterally.        Past Medical History:   Diagnosis Date    Allergic rhinitis, mild     Anemia     Anxiety     Degenerative disc disease, cervical     Depression     GERD (gastroesophageal reflux disease)     Hyperlipidemia     Hypertension     Insomnia     Migraine     Vitamin D deficiency      Past Surgical History:   Procedure Laterality Date    ANGIOGRAM, CORONARY, WITH LEFT HEART CATHETERIZATION Left 9/17/2021    Procedure: Left heart cath w/ coronary angiograms;  Surgeon: Demi Carias MD;  Location: [facility] CATH LAB;  Service: Cardiology;  Laterality: Left;    INGUINAL HERNIA REPAIR Right 1968    LAPAROSCOPIC CHOLECYSTECTOMY WITH CHOLANGIOGRAPHY N/A 2/8/2022    Procedure: CHOLECYSTECTOMY, LAPAROSCOPIC, WITH CHOLANGIOGRAM;  Surgeon: Steve Florez MD;  Location: [facility] OR;  Service: General;  Laterality: N/A;    LEFT HEART CATHETERIZATION Left 2/10/2022    Procedure: Left heart cath;  Surgeon: Francisco Rosenbaum MD;  Location: [facility] CATH LAB;  Service: Cardiology;  Laterality: Left;    OPEN REDUCTION AND INTERNAL FIXATION (ORIF) OF INJURY OF WRIST Right 11/12/2022    Procedure: ORIF, WRIST;  Surgeon: Maverick Zepeda MD;  Location: [facility] OR;  Service: Orthopedics;  Laterality: Right;         PHYSICAL EXAMINATION:    Ortho/SPM Exam  She is satisfactory  strength present bilaterally in full radiocarpal range of motion seen.    IMAGING:  X-Ray Wrist Complete Bilateral    Result Date: 1/31/2023  See Procedure Notes for results. IMPRESSION: Please see Ortho procedure notes for report.  This procedure was auto-finalized by: Virtual Radiologist  Healing distal radius  fracture which appeared to be in near anatomic alignment  X-Ray Wrist Complete 3 views Bilateral    Result Date: 1/3/2023  EXAMINATION: XR WRIST COMPLETE 3 VIEWS BILATERAL CLINICAL HISTORY: Other intraarticular fracture of lower end of unspecified radius, subsequent encounter for closed fracture with routine healing COMPARISON: 29 November 2022 FINDINGS: Internal fixation right wrist fracture and left wrist fracture with external cast appears similar to previous x-ray.  There may be a small amount of increased healing, detail limited.     Fractures as described above. Electronically signed by: Noe Villegas Date:    01/03/2023 Time:    08:54       ASSESSMENT:      ICD-10-CM ICD-9-CM   1. Closed fracture of both wrists with routine healing, subsequent encounter  S62.101D V54.12    S62.102D        PLAN:     -Findings and treatment options were discussed with the patient  -All questions answered      Overall doing great.  Okay to resume all activity as tolerated will follow up as needed.    There are no Patient Instructions on file for this visit.      No orders of the defined types were placed in this encounter.        Procedures

## 2023-03-07 ENCOUNTER — PROCEDURE VISIT (OUTPATIENT)
Dept: SLEEP MEDICINE | Facility: HOSPITAL | Age: 63
End: 2023-03-07
Attending: FAMILY MEDICINE
Payer: COMMERCIAL

## 2023-03-07 DIAGNOSIS — G47.33 OSA (OBSTRUCTIVE SLEEP APNEA): ICD-10-CM

## 2023-03-07 PROCEDURE — 95811 POLYSOM 6/>YRS CPAP 4/> PARM: CPT

## 2023-03-08 PROCEDURE — 95811 POLYSOM 6/>YRS CPAP 4/> PARM: CPT | Mod: 26,,, | Performed by: FAMILY MEDICINE

## 2023-03-08 PROCEDURE — 95811 PR POLYSOMNOGRAPHY W/CPAP: ICD-10-PCS | Mod: 26,,, | Performed by: FAMILY MEDICINE

## 2023-03-17 ENCOUNTER — PATIENT MESSAGE (OUTPATIENT)
Dept: RESEARCH | Facility: HOSPITAL | Age: 63
End: 2023-03-17

## 2023-07-03 ENCOUNTER — PATIENT OUTREACH (OUTPATIENT)
Dept: ADMINISTRATIVE | Facility: HOSPITAL | Age: 63
End: 2023-07-03

## 2023-07-03 NOTE — PROGRESS NOTES
07/03/2023   --Chart accessed for:Care Gaps   --Care Gaps addressed:Pap Smear and Cologuard  Outreach made to patient via portal . (Success) (Left Message) (Unavailable)   Care Everywhere updates requested and reviewed.  Media reports reviewed.  [facility] and [facility] reviewed.  Immunization Database (Immprint/MIXX) reviewed. Vaccinations uploaded:n/a  Sent msg thru portal to pt regarding pap smar. Pt had one done in 2018, but is due for another one   Health Maintenance Due   Topic Date Due    Hepatitis C Screening  Never done    COVID-19 Vaccine (1) Never done    HIV Screening  Never done    Hemoglobin A1c (Diabetic Prevention Screening)  Never done    Shingles Vaccine (1 of 2) Never done    Cervical Cancer Screening  04/11/2021    Mammogram  10/07/2021

## 2023-07-03 NOTE — LETTER
July 3, 2023    Radha Crews  [address] [place] [address] [place] [address]             [facility]  Administration  [address] [address]  Phone: [phone] Dear Mrs. Crews:    Our records indicate that you are due for a Pap smear.    The [place]. Preventive Services Task Force strongly recommends cervical cancer screening for all sexually-active women who haven't had their cervix removed.    Please make an appointment for a Pap smear at your earliest convenience. If you are having your Pap smears done elsewhere, please let us know.     Sincerely,      Eric Benavides, DO Johnna Jara, Care Coordinator [phone]

## 2023-12-22 DIAGNOSIS — M48.061 SPINAL STENOSIS OF LUMBAR REGION WITHOUT NEUROGENIC CLAUDICATION: Primary | ICD-10-CM

## 2024-01-18 DIAGNOSIS — M54.16 LUMBAR RADICULOPATHY: Primary | ICD-10-CM

## 2024-01-19 ENCOUNTER — HOSPITAL ENCOUNTER (OUTPATIENT)
Dept: RADIOLOGY | Facility: HOSPITAL | Age: 64
Discharge: HOME | End: 2024-01-19
Attending: ORTHOPAEDIC SURGERY
Payer: COMMERCIAL

## 2024-01-19 ENCOUNTER — OFFICE VISIT (OUTPATIENT)
Dept: SPINE | Facility: CLINIC | Age: 64
End: 2024-01-19
Payer: COMMERCIAL

## 2024-01-19 DIAGNOSIS — M54.16 LUMBAR RADICULOPATHY: ICD-10-CM

## 2024-01-19 DIAGNOSIS — M50.30 DEGENERATIVE DISC DISEASE, CERVICAL: ICD-10-CM

## 2024-01-19 DIAGNOSIS — M54.16 LUMBAR RADICULOPATHY: Primary | ICD-10-CM

## 2024-01-19 DIAGNOSIS — M48.061 SPINAL STENOSIS OF LUMBAR REGION WITHOUT NEUROGENIC CLAUDICATION: ICD-10-CM

## 2024-01-19 PROCEDURE — 99204 OFFICE O/P NEW MOD 45 MIN: CPT | Mod: S$PBB,,, | Performed by: ORTHOPAEDIC SURGERY

## 2024-01-19 PROCEDURE — 72110 X-RAY EXAM L-2 SPINE 4/>VWS: CPT | Mod: TC

## 2024-01-19 PROCEDURE — 72110 X-RAY EXAM L-2 SPINE 4/>VWS: CPT | Mod: 26,,, | Performed by: ORTHOPAEDIC SURGERY

## 2024-01-19 PROCEDURE — 99213 OFFICE O/P EST LOW 20 MIN: CPT | Mod: PBBFAC | Performed by: ORTHOPAEDIC SURGERY

## 2024-01-19 PROCEDURE — 1159F MED LIST DOCD IN RCRD: CPT | Mod: ,,, | Performed by: ORTHOPAEDIC SURGERY

## 2024-01-19 RX ORDER — CELECOXIB 200 MG/1
200 CAPSULE ORAL DAILY
Qty: 30 CAPSULE | Refills: 5 | Status: SHIPPED | OUTPATIENT
Start: 2024-01-19

## 2024-01-19 RX ORDER — GABAPENTIN 300 MG/1
300 CAPSULE ORAL 3 TIMES DAILY
Qty: 90 CAPSULE | Refills: 11 | Status: SHIPPED | OUTPATIENT
Start: 2024-01-19 | End: 2025-01-18

## 2024-01-19 NOTE — PROGRESS NOTES
AP, lateral, flexion/extension views of the lumbar spine reviewed    On the AP there is degenerative lumbar scoliosis with trunk shift to the left.  There are 5 non-rib-bearing lumbar vertebrae.  On the lateral there is decreased lumbar lordosis.  There is spondylotic disease with decreased disc height and osteophyte formation noted.  No fractures or listhesis noted.  No instability on flexion-extension views.    Impression:  Spondylotic changes of the lumbar spine as noted above

## 2024-01-19 NOTE — PROGRESS NOTES
MDM/time:  Greater than 45 minutes spent on this encounter including 15 minutes reviewing imaging and notes, 20 minutes with the patient, 10 minutes documentation    ASSESSMENT:  64 y.o. female with Lumbar spondylosis with radiculopathy     PLAN:  PT lumbar spine   Neurontin 300mg 1 tab TID   Refill celebrex 200mg 1 tab daily  Referral to pain management to discuss epidural injections   Follow up 3 months     HPI:  64 y.o. female here for evaluation of lower back pain that radiates into bilateral buttock.  Patient reports a longstanding history of back pain that August 2023 started having the pain in her bilateral buttocks area.  Reports increased pain with prolonged sitting or standing.  No issues with  strength.  No issues with balance or falls.  Denies bladder bowel incontinence.  No difficulty walking distances.  Currently taking ibuprofen 4 tablets every 6-8 hours as needed for pain.  No recent physical therapy.  No prior spine surgery.  No prior pain management.  Recent MRI at [facility] 1129/23.  Patient is not a current smoker.    IMAGING:  AP, lateral, flexion/extension views of the lumbar spine reviewed     On the AP there is degenerative lumbar scoliosis with trunk shift to the left.  There are 5 non-rib-bearing lumbar vertebrae.  On the lateral there is decreased lumbar lordosis.  There is spondylotic disease with decreased disc height and osteophyte formation noted.  No fractures or listhesis noted.  No instability on flexion-extension views.     Impression:  Spondylotic changes of the lumbar spine as noted above    11/29/2023 MRI lumbar spine [facility] reviewed showed:  L2-3 moderate right foraminal stenosis   L3-4 moderate bilateral lateral recess stenosis and moderate bilateral foraminal stenosis   L4-5 severe left/moderate right recess stenosis and severe left/moderate right foraminal stenosis  L5-S1 moderate bilateral lateral recess stenosis with moderate bilateral foraminal  stenosis    Past Medical History:   Diagnosis Date    Allergic rhinitis, mild     Anemia     Anxiety     Degenerative disc disease, cervical     Depression     GERD (gastroesophageal reflux disease)     Hyperlipidemia     Hypertension     Insomnia     Migraine     Vitamin D deficiency      Past Surgical History:   Procedure Laterality Date    ANGIOGRAM, CORONARY, WITH LEFT HEART CATHETERIZATION Left 2021    Procedure: Left heart cath w/ coronary angiograms;  Surgeon: Demi Carias MD;  Location: [facility] CATH LAB;  Service: Cardiology;  Laterality: Left;    INGUINAL HERNIA REPAIR Right     LAPAROSCOPIC CHOLECYSTECTOMY WITH CHOLANGIOGRAPHY N/A 2022    Procedure: CHOLECYSTECTOMY, LAPAROSCOPIC, WITH CHOLANGIOGRAM;  Surgeon: Steve Florez MD;  Location: [facility] OR;  Service: General;  Laterality: N/A;    LEFT HEART CATHETERIZATION Left 2/10/2022    Procedure: Left heart cath;  Surgeon: Francisco Rosenbaum MD;  Location: [facility] CATH LAB;  Service: Cardiology;  Laterality: Left;    OPEN REDUCTION AND INTERNAL FIXATION (ORIF) OF INJURY OF WRIST Right 2022    Procedure: ORIF, WRIST;  Surgeon: Maverick Zepeda MD;  Location: [facility] OR;  Service: Orthopedics;  Laterality: Right;     Social History     Tobacco Use    Smoking status: Former     Current packs/day: 0.00     Types: Cigarettes     Quit date: 10/2009     Years since quittin.3    Smokeless tobacco: Never   Substance Use Topics    Alcohol use: Never    Drug use: Never      Current Outpatient Medications   Medication Instructions    albuterol (PROVENTIL/VENTOLIN HFA) 90 mcg/actuation inhaler 2 puffs, Inhalation, Every 6 hours PRN, Rescue     budesonide-glycopyr-formoterol (BREZTRI AEROSPHERE) 160-9-4.8 mcg/actuation HFAA 2 puffs, Inhalation, Daily    celecoxib (CELEBREX) 200 mg, Oral, Daily    cetirizine (ZYRTEC) 10 mg, Oral, Daily    ciprofloxacin HCl (CIPRO) 500 mg, Oral, Every 12 hours    EScitalopram oxalate (LEXAPRO) 10 mg,  Oral, Daily    fluticasone propionate (FLONASE) 50 mcg, Each Nostril, 2 times daily    hydroCHLOROthiazide (HYDRODIURIL) 12.5 mg, Oral, Daily    hydrOXYzine pamoate (VISTARIL) 25 mg, Oral, 2 times daily PRN    omeprazole (PRILOSEC) 20 mg, Oral, Daily    ondansetron (ZOFRAN-ODT) 4 mg, Oral, 2 times daily    ondansetron (ZOFRAN-ODT) 8 mg, Oral, Every 8 hours PRN    oxyCODONE-acetaminophen (PERCOCET) 5-325 mg per tablet 1 tablet, Oral, Every 6 hours PRN    pantoprazole (PROTONIX) 40 mg, Oral, Daily    sumatriptan (IMITREX) 100 mg, Oral, Every 2 hours PRN, 1 tablet by mouth at onset of headache,may repeat once in 24 hours if no relief.     tiZANidine (ZANAFLEX) 4 mg, Oral, Nightly        EXAM:  Constitutional  General Appearance:  There is no height or weight on file to calculate BMI., NAD  Psychiatric   Orientation: Oriented to time, oriented to place, oriented to person  Mood and Affect: Active and alert, normal mood, normal affect  Gait and Station   Appearance:  Normal gait, normal tandem gait, able to walk on toes, able to walk on heels    LUMBAR  Musculoskeletal System   Hips: Normal appearance, no leg length discrepancy, normal motion; left, normal motion; right    Lumbar Spine                   Inspection:  Normal alignment, normal sagittal balance                  Range of motion: decreased flexion, extension, lateral bending, rotation. Pain with range of motion                  Bony Palpation of the Lumbar Spine:  No tenderness of the spinous process, + tenderness of the sacrum, no tenderness of the coccyx                  Bony Palpation of the Right Hip:  No tenderness of the iliac crest, no tenderness of the sciatic notch, no tenderness of the SI joint                  Bony Palpation of the Left Hip:  No tenderness of the iliac crest, no tenderness of the sciatic notch, no tenderness of the SI joint                  Soft Tissue Palpation on the Right:  No tenderness of the paraspinal region, no tenderness of  the iliolumbar region                  Soft Tissue Palpation on the Left:  No tenderness of the paraspinal region, no tenderness of the iliolumbar region    Motor Strength   L1 Right:  Hip flexion iliopsoas 5/5    L1 Left:  Hip flexion iliopsoas 5/5              L2-L4 Right:  Knee extension quadriceps 5/5, tibialis anterior 5/5              L2-L4 Left:  Knee extension quadriceps 5/5, tibialis anterior 5/5   L5 Right:  Extensor hallucis llongus 5/5,    L5 Left:  Extensor hallucis longus 5/5,    S1 Right:  Plantar flexion gastrocnemius 5/5   S1 Left:  Plantar flexion gastrocnemius 5/5    Neurological System   Ankle Reflex Right:  normal   Ankle Reflex Left: normal   Knee Reflex Right:  normal   Knee Reflex Left:  normal   Sensation on the Right:  L2 normal, L3 normal, L4 normal, L5 normal, S1 normal   Sensation on the Left:  L2 normal, L3 normal, L4 normal, L5 normal, S1 normal              Special Test on the Right:  Seated straight leg raising test negative, no clonus of the ankle              Special Test on the Left:  Seated straight leg raising test negative, no clonus of the ankle    Skin   Lumbosacral Spine:  Normal skin    Cardiovascular System   Arterial Pulses Right:  Posterior tibialis normal, dorsalis pedis normal   Arterial Pulses Left:  Posterior tibialis normal, dorsalis pedis normal   Edema Right: None   Edema Left:  None

## 2024-04-02 ENCOUNTER — OFFICE VISIT (OUTPATIENT)
Dept: FAMILY MEDICINE | Facility: CLINIC | Age: 64
End: 2024-04-02
Payer: COMMERCIAL

## 2024-04-02 VITALS
SYSTOLIC BLOOD PRESSURE: 132 MMHG | OXYGEN SATURATION: 93 % | HEART RATE: 68 BPM | HEIGHT: 63 IN | BODY MASS INDEX: 31.36 KG/M2 | DIASTOLIC BLOOD PRESSURE: 82 MMHG | RESPIRATION RATE: 22 BRPM | WEIGHT: 177 LBS

## 2024-04-02 DIAGNOSIS — R05.1 ACUTE COUGH: ICD-10-CM

## 2024-04-02 DIAGNOSIS — J01.00 ACUTE MAXILLARY SINUSITIS, RECURRENCE NOT SPECIFIED: Primary | ICD-10-CM

## 2024-04-02 PROCEDURE — 99213 OFFICE O/P EST LOW 20 MIN: CPT | Mod: 25,,,

## 2024-04-02 PROCEDURE — 3008F BODY MASS INDEX DOCD: CPT | Mod: ,,,

## 2024-04-02 PROCEDURE — 1159F MED LIST DOCD IN RCRD: CPT | Mod: ,,,

## 2024-04-02 PROCEDURE — 1160F RVW MEDS BY RX/DR IN RCRD: CPT | Mod: ,,,

## 2024-04-02 PROCEDURE — 3075F SYST BP GE 130 - 139MM HG: CPT | Mod: ,,,

## 2024-04-02 PROCEDURE — 96372 THER/PROPH/DIAG INJ SC/IM: CPT | Mod: ,,,

## 2024-04-02 PROCEDURE — 3079F DIAST BP 80-89 MM HG: CPT | Mod: ,,,

## 2024-04-02 RX ORDER — DEXAMETHASONE SODIUM PHOSPHATE 4 MG/ML
4 INJECTION, SOLUTION INTRA-ARTICULAR; INTRALESIONAL; INTRAMUSCULAR; INTRAVENOUS; SOFT TISSUE
Status: COMPLETED | OUTPATIENT
Start: 2024-04-02 | End: 2024-04-02

## 2024-04-02 RX ORDER — ERGOCALCIFEROL 1.25 MG/1
50000 CAPSULE ORAL
COMMUNITY
Start: 2024-03-20

## 2024-04-02 RX ORDER — HYDROCODONE BITARTRATE AND ACETAMINOPHEN 7.5; 325 MG/1; MG/1
1 TABLET ORAL DAILY PRN
COMMUNITY
Start: 2024-02-28

## 2024-04-02 RX ORDER — BUPROPION HYDROCHLORIDE 150 MG/1
150 TABLET, EXTENDED RELEASE ORAL DAILY
COMMUNITY
Start: 2024-02-28

## 2024-04-02 RX ORDER — BROMPHENIRAMINE MALEATE, PSEUDOEPHEDRINE HYDROCHLORIDE, AND DEXTROMETHORPHAN HYDROBROMIDE 2; 30; 10 MG/5ML; MG/5ML; MG/5ML
10 SYRUP ORAL
Qty: 200 ML | Refills: 0 | Status: SHIPPED | OUTPATIENT
Start: 2024-04-02 | End: 2024-04-12

## 2024-04-02 RX ORDER — AMOXICILLIN AND CLAVULANATE POTASSIUM 875; 125 MG/1; MG/1
1 TABLET, FILM COATED ORAL EVERY 12 HOURS
Qty: 20 TABLET | Refills: 0 | Status: SHIPPED | OUTPATIENT
Start: 2024-04-02 | End: 2024-04-12

## 2024-04-02 RX ORDER — MONTELUKAST SODIUM 10 MG/1
10 TABLET ORAL NIGHTLY
COMMUNITY
Start: 2024-02-28

## 2024-04-02 RX ORDER — CEFTRIAXONE 1 G/1
1 INJECTION, POWDER, FOR SOLUTION INTRAMUSCULAR; INTRAVENOUS
Status: COMPLETED | OUTPATIENT
Start: 2024-04-02 | End: 2024-04-02

## 2024-04-02 RX ADMIN — CEFTRIAXONE 1 G: 1 INJECTION, POWDER, FOR SOLUTION INTRAMUSCULAR; INTRAVENOUS at 03:04

## 2024-04-02 RX ADMIN — DEXAMETHASONE SODIUM PHOSPHATE 4 MG: 4 INJECTION, SOLUTION INTRA-ARTICULAR; INTRALESIONAL; INTRAMUSCULAR; INTRAVENOUS; SOFT TISSUE at 03:04

## 2024-04-02 NOTE — PROGRESS NOTES
CINDY PERLA   [facility]  [address]  [place]  [address]      PATIENT NAME: Radha Crews  : 1960  DATE: 24  MRN: [id number]        Reason for Visit / Chief Complaint: Cough (Cough for 1 week. Patient was not feeling well last week with fever and general body aches. She reports she is feeling much better, but the cough continues. She is hoarse today. ) and Health Maintenance (Spoke with patient about care gaps. She reported she will discuss getting the recommendations completed with her PCP.)         History of Present Illness / Problem Focused Workflow           Review of Systems     @Review of Systems    Medical / Social / Family History     Past Medical History:   Diagnosis Date    Allergic rhinitis, mild     Anemia     Anxiety     Degenerative disc disease, cervical     Depression     GERD (gastroesophageal reflux disease)     Hyperlipidemia     Insomnia     Migraine     Vitamin D deficiency        Past Surgical History:   Procedure Laterality Date    ANGIOGRAM, CORONARY, WITH LEFT HEART CATHETERIZATION Left 2021    Procedure: Left heart cath w/ coronary angiograms;  Surgeon: Demi Carias MD;  Location: [facility] CATH LAB;  Service: Cardiology;  Laterality: Left;    INGUINAL HERNIA REPAIR Right     LAPAROSCOPIC CHOLECYSTECTOMY WITH CHOLANGIOGRAPHY N/A 2022    Procedure: CHOLECYSTECTOMY, LAPAROSCOPIC, WITH CHOLANGIOGRAM;  Surgeon: Steve Florez MD;  Location: [facility] OR;  Service: General;  Laterality: N/A;    LEFT HEART CATHETERIZATION Left 2/10/2022    Procedure: Left heart cath;  Surgeon: Francisco Rosenbaum MD;  Location: [facility] CATH LAB;  Service: Cardiology;  Laterality: Left;    OPEN REDUCTION AND INTERNAL FIXATION (ORIF) OF INJURY OF WRIST Right 2022    Procedure: ORIF, WRIST;  Surgeon: Maverick Zepeda MD;  Location: [facility] OR;  Service: Orthopedics;  Laterality: Right;           Medications and Allergies      Medications  Outpatient Medications Marked as Taking for the 4/2/24 encounter (Office Visit) with William Davis FNP   Medication Sig Dispense Refill    buPROPion (WELLBUTRIN SR) 150 MG TBSR 12 hr tablet Take 150 mg by mouth once daily.      celecoxib (CELEBREX) 200 MG capsule Take 1 capsule (200 mg total) by mouth once daily. 30 capsule 5    ergocalciferol (ERGOCALCIFEROL) 50,000 unit Cap Take 50,000 Units by mouth every 7 days.      EScitalopram oxalate (LEXAPRO) 10 MG tablet Take 1 tablet (10 mg total) by mouth once daily. 30 tablet 5    gabapentin (NEURONTIN) 300 MG capsule Take 1 capsule (300 mg total) by mouth 3 (three) times daily. 90 capsule 11    HYDROcodone-acetaminophen (NORCO) 7.5-325 mg per tablet Take 1 tablet by mouth daily as needed for Pain.      montelukast (SINGULAIR) 10 mg tablet Take 10 mg by mouth every evening.      omeprazole (PRILOSEC) 20 MG capsule Take 1 capsule (20 mg total) by mouth once daily. 30 capsule 5    sumatriptan (IMITREX) 100 MG tablet Take 1 tablet (100 mg total) by mouth every 2 (two) hours as needed for Migraine (do not take over 200 mg in 24 hrs). 1 tablet by mouth at onset of headache,may repeat once in 24 hours if no relief. 9 tablet 5    tiZANidine (ZANAFLEX) 4 MG tablet Take 1 tablet (4 mg total) by mouth nightly. 30 tablet 5       Allergies  Review of patient's allergies indicates:  No Known Allergies    Physical Examination     Vitals:    04/02/24 1422   BP: 132/82   Pulse: 68   Resp: (!) 22     Physical Exam          Procedures   Assessment and Plan (including Health Maintenance)   :    Plan:     Problem List Items Addressed This Visit    None      Health Maintenance Topics with due status: Not Due       Topic Last Completion Date    TETANUS VACCINE 07/09/2021    Lipid Panel 05/19/2022       Future Appointments   Date Time Provider Department Center   5/20/2024  4:00 PM Garret Mendoza MD [facility]        Health Maintenance Due   Topic Date Due     Hepatitis C Screening  Never done    COVID-19 Vaccine (1) Never done    HIV Screening  Never done    Hemoglobin A1c (Diabetic Prevention Screening)  Never done    Shingles Vaccine (1 of 2) Never done    Cervical Cancer Screening  04/11/2021    Mammogram  10/07/2021    RSV Vaccine (Age 60+ and Pregnant patients) (1 - 1-dose 60+ series) Never done    Colorectal Cancer Screening  10/05/2023        No follow-ups on file.     Signature:  KARUNA OVIEDO [address]  [address]    Date of encounter: 4/2/24

## 2024-04-02 NOTE — PROGRESS NOTES
CINDY PERLA   [facility]  [address]  [place]  [address]      PATIENT NAME: Radha Crews  : 1960  DATE: 24  MRN: [id number]        Reason for Visit / Chief Complaint: Cough (Cough for 1 week. Patient was not feeling well last week with fever and general body aches. She reports she is feeling much better, but the cough continues. She is hoarse today. ) and Health Maintenance (Spoke with patient about care gaps. She reported she will discuss getting the recommendations completed with her PCP.)         History of Present Illness / Problem Focused Workflow     63 y/o female presents to clinic with complaints of cough, fever, body aches. Pt states fever and body aches are better than last week but she can not get rid of the cough. Has taken OTC cough medication with little relief. Also complains of congestion and sinus pressure the last couple days      Review of Systems     @Review of Systems   Constitutional:  Positive for fever. Negative for chills and fatigue.   HENT:  Positive for nasal congestion and sinus pressure/congestion. Negative for ear discharge, ear pain, rhinorrhea and sore throat.    Respiratory:  Positive for cough. Negative for chest tightness, shortness of breath, wheezing and stridor.    Cardiovascular:  Negative for palpitations and claudication.   Gastrointestinal:  Negative for abdominal pain, constipation, diarrhea, nausea, vomiting and reflux.   Genitourinary:  Negative for dysuria, flank pain, frequency, hematuria and urgency.   Musculoskeletal:  Negative for myalgias.   Neurological:  Negative for dizziness, weakness, light-headedness and headaches.   Psychiatric/Behavioral:  Negative for suicidal ideas.        Medical / Social / Family History     Past Medical History:   Diagnosis Date    Allergic rhinitis, mild     Anemia     Anxiety     Degenerative disc disease, cervical     Depression     GERD (gastroesophageal reflux disease)      Hyperlipidemia     Insomnia     Migraine     Vitamin D deficiency        Past Surgical History:   Procedure Laterality Date    ANGIOGRAM, CORONARY, WITH LEFT HEART CATHETERIZATION Left 9/17/2021    Procedure: Left heart cath w/ coronary angiograms;  Surgeon: Demi Carias MD;  Location: [facility] CATH LAB;  Service: Cardiology;  Laterality: Left;    INGUINAL HERNIA REPAIR Right 1968    LAPAROSCOPIC CHOLECYSTECTOMY WITH CHOLANGIOGRAPHY N/A 2/8/2022    Procedure: CHOLECYSTECTOMY, LAPAROSCOPIC, WITH CHOLANGIOGRAM;  Surgeon: Steve Florez MD;  Location: [facility] OR;  Service: General;  Laterality: N/A;    LEFT HEART CATHETERIZATION Left 2/10/2022    Procedure: Left heart cath;  Surgeon: Francisco Rosenbaum MD;  Location: [facility] CATH LAB;  Service: Cardiology;  Laterality: Left;    OPEN REDUCTION AND INTERNAL FIXATION (ORIF) OF INJURY OF WRIST Right 11/12/2022    Procedure: ORIF, WRIST;  Surgeon: Maverick Zepeda MD;  Location: [facility] OR;  Service: Orthopedics;  Laterality: Right;           Medications and Allergies     Medications  Outpatient Medications Marked as Taking for the 4/2/24 encounter (Office Visit) with William Davis FNP   Medication Sig Dispense Refill    buPROPion (WELLBUTRIN SR) 150 MG TBSR 12 hr tablet Take 150 mg by mouth once daily.      celecoxib (CELEBREX) 200 MG capsule Take 1 capsule (200 mg total) by mouth once daily. 30 capsule 5    ergocalciferol (ERGOCALCIFEROL) 50,000 unit Cap Take 50,000 Units by mouth every 7 days.      EScitalopram oxalate (LEXAPRO) 10 MG tablet Take 1 tablet (10 mg total) by mouth once daily. 30 tablet 5    gabapentin (NEURONTIN) 300 MG capsule Take 1 capsule (300 mg total) by mouth 3 (three) times daily. 90 capsule 11    HYDROcodone-acetaminophen (NORCO) 7.5-325 mg per tablet Take 1 tablet by mouth daily as needed for Pain.      montelukast (SINGULAIR) 10 mg tablet Take 10 mg by mouth every evening.      omeprazole (PRILOSEC) 20 MG capsule Take 1 capsule  (20 mg total) by mouth once daily. 30 capsule 5    sumatriptan (IMITREX) 100 MG tablet Take 1 tablet (100 mg total) by mouth every 2 (two) hours as needed for Migraine (do not take over 200 mg in 24 hrs). 1 tablet by mouth at onset of headache,may repeat once in 24 hours if no relief. 9 tablet 5    tiZANidine (ZANAFLEX) 4 MG tablet Take 1 tablet (4 mg total) by mouth nightly. 30 tablet 5       Allergies  Review of patient's allergies indicates:  No Known Allergies    Physical Examination     Vitals:    04/02/24 1422   BP: 132/82   Pulse: 68   Resp: (!) 22     Physical Exam  Vitals and nursing note reviewed.   Constitutional:       General: She is awake.      Appearance: Normal appearance.   HENT:      Head: Normocephalic.      Right Ear: Tympanic membrane, ear canal and external ear normal.      Left Ear: Tympanic membrane, ear canal and external ear normal.      Nose: Congestion present.      Right Sinus: Maxillary sinus tenderness present.      Left Sinus: Maxillary sinus tenderness present.      Mouth/Throat:      Lips: Pink.      Mouth: Mucous membranes are moist.      Pharynx: Oropharynx is clear. Uvula midline. Posterior oropharyngeal erythema present.   Cardiovascular:      Rate and Rhythm: Normal rate and regular rhythm.      Heart sounds: Normal heart sounds, S1 normal and S2 normal.   Pulmonary:      Effort: Pulmonary effort is normal. No respiratory distress.      Breath sounds: Normal breath sounds. No decreased breath sounds, wheezing, rhonchi or rales.   Abdominal:      General: Bowel sounds are normal.      Palpations: Abdomen is soft.      Tenderness: There is no abdominal tenderness.   Musculoskeletal:      Cervical back: Normal range of motion.   Skin:     General: Skin is warm.      Capillary Refill: Capillary refill takes less than 2 seconds.   Neurological:      Mental Status: She is alert and oriented to person, place, and time.   Psychiatric:         Thought Content: Thought content does not  include homicidal or suicidal ideation. Thought content does not include homicidal or suicidal plan.               Procedures   Assessment and Plan (including Health Maintenance)   :    Plan:     Problem List Items Addressed This Visit          ENT    Acute maxillary sinusitis - Primary    Current Assessment & Plan     Rocephin IM and Decadron IM today. Augmentin and Bromfed RX. Medication instructions and education given with understanding voiced. Rest, increase fluids. OTC antihistamines, decongestants, Ibuprofen, Tylenol as needed. RTC with worsening, new or persistent symptoms.           Relevant Medications    amoxicillin-clavulanate 875-125mg (AUGMENTIN) 875-125 mg per tablet    brompheniramine-pseudoeph-DM (BROMFED DM) 2-30-10 mg/5 mL Syrp       Pulmonary    Acute cough    Relevant Medications    brompheniramine-pseudoeph-DM (BROMFED DM) 2-30-10 mg/5 mL Syrp       Health Maintenance Topics with due status: Not Due       Topic Last Completion Date    TETANUS VACCINE 07/09/2021    Lipid Panel 05/19/2022       Future Appointments   Date Time Provider Department Center   5/20/2024  4:00 PM Garret Mendoza MD [facility]        Health Maintenance Due   Topic Date Due    Hepatitis C Screening  Never done    COVID-19 Vaccine (1) Never done    HIV Screening  Never done    Hemoglobin A1c (Diabetic Prevention Screening)  Never done    Shingles Vaccine (1 of 2) Never done    Cervical Cancer Screening  04/11/2021    Mammogram  10/07/2021    RSV Vaccine (Age 60+ and Pregnant patients) (1 - 1-dose 60+ series) Never done    Colorectal Cancer Screening  10/05/2023        Follow up if symptoms worsen or fail to improve.     Signature:  KARUNA OVIEDO CJ  [address]  [address]    Date of encounter: 4/2/24

## 2024-04-10 PROBLEM — R05.1 ACUTE COUGH: Status: ACTIVE | Noted: 2024-04-10

## 2024-04-10 PROBLEM — J01.00 ACUTE MAXILLARY SINUSITIS: Status: ACTIVE | Noted: 2024-04-10

## 2024-04-10 NOTE — ASSESSMENT & PLAN NOTE
Rocephin IM and Decadron IM today. Augmentin and Bromfed RX. Medication instructions and education given with understanding voiced. Rest, increase fluids. OTC antihistamines, decongestants, Ibuprofen, Tylenol as needed. RTC with worsening, new or persistent symptoms.

## 2024-07-15 PROBLEM — J01.00 ACUTE MAXILLARY SINUSITIS: Status: RESOLVED | Noted: 2024-04-10 | Resolved: 2024-07-15

## 2025-01-13 ENCOUNTER — OFFICE VISIT (OUTPATIENT)
Dept: FAMILY MEDICINE | Facility: CLINIC | Age: 65
End: 2025-01-13
Payer: COMMERCIAL

## 2025-01-13 VITALS
WEIGHT: 141.38 LBS | SYSTOLIC BLOOD PRESSURE: 99 MMHG | OXYGEN SATURATION: 95 % | HEIGHT: 63 IN | TEMPERATURE: 98 F | DIASTOLIC BLOOD PRESSURE: 63 MMHG | HEART RATE: 104 BPM | RESPIRATION RATE: 20 BRPM | BODY MASS INDEX: 25.05 KG/M2

## 2025-01-13 DIAGNOSIS — J01.40 ACUTE PANSINUSITIS, RECURRENCE NOT SPECIFIED: Primary | ICD-10-CM

## 2025-01-13 DIAGNOSIS — R52 GENERALIZED BODY ACHES: ICD-10-CM

## 2025-01-13 DIAGNOSIS — R05.1 ACUTE COUGH: ICD-10-CM

## 2025-01-13 DIAGNOSIS — R50.9 FEVER, UNSPECIFIED FEVER CAUSE: ICD-10-CM

## 2025-01-13 DIAGNOSIS — R09.81 NASAL CONGESTION: ICD-10-CM

## 2025-01-13 DIAGNOSIS — J02.9 SORE THROAT: ICD-10-CM

## 2025-01-13 LAB
CTP QC/QA: YES
MOLECULAR STREP A: NEGATIVE
POC MOLECULAR INFLUENZA A AGN: NEGATIVE
POC MOLECULAR INFLUENZA B AGN: NEGATIVE
SARS-COV-2 RDRP RESP QL NAA+PROBE: NEGATIVE

## 2025-01-13 PROCEDURE — 99214 OFFICE O/P EST MOD 30 MIN: CPT | Mod: 25,,,

## 2025-01-13 PROCEDURE — 1159F MED LIST DOCD IN RCRD: CPT | Mod: ,,,

## 2025-01-13 PROCEDURE — 87651 STREP A DNA AMP PROBE: CPT | Mod: QW,,,

## 2025-01-13 PROCEDURE — 3008F BODY MASS INDEX DOCD: CPT | Mod: ,,,

## 2025-01-13 PROCEDURE — 96372 THER/PROPH/DIAG INJ SC/IM: CPT | Mod: ,,,

## 2025-01-13 PROCEDURE — 1160F RVW MEDS BY RX/DR IN RCRD: CPT | Mod: ,,,

## 2025-01-13 PROCEDURE — 3074F SYST BP LT 130 MM HG: CPT | Mod: ,,,

## 2025-01-13 PROCEDURE — 3078F DIAST BP <80 MM HG: CPT | Mod: ,,,

## 2025-01-13 PROCEDURE — 87635 SARS-COV-2 COVID-19 AMP PRB: CPT | Mod: QW,,,

## 2025-01-13 PROCEDURE — 87502 INFLUENZA DNA AMP PROBE: CPT | Mod: QW,,,

## 2025-01-13 RX ORDER — CEFTRIAXONE 1 G/1
1 INJECTION, POWDER, FOR SOLUTION INTRAMUSCULAR; INTRAVENOUS
Status: COMPLETED | OUTPATIENT
Start: 2025-01-13 | End: 2025-01-13

## 2025-01-13 RX ORDER — DEXCHLORPHENIRAMINE MALEATE, DEXTROMETHORPHAN HBR, PHENYLEPHRINE HCL 1; 10; 5 MG/5ML; MG/5ML; MG/5ML
5 SYRUP ORAL EVERY 6 HOURS PRN
Qty: 100 ML | Refills: 0 | Status: SHIPPED | OUTPATIENT
Start: 2025-01-13

## 2025-01-13 RX ORDER — METHYLPREDNISOLONE 4 MG/1
TABLET ORAL
Qty: 21 TABLET | Refills: 0 | Status: SHIPPED | OUTPATIENT
Start: 2025-01-13

## 2025-01-13 RX ORDER — SEMAGLUTIDE 0.25 MG/.5ML
0.25 INJECTION, SOLUTION SUBCUTANEOUS WEEKLY
COMMUNITY
Start: 2024-08-02 | End: 2025-01-13

## 2025-01-13 RX ORDER — AZITHROMYCIN 250 MG/1
TABLET, FILM COATED ORAL
Qty: 6 TABLET | Refills: 0 | Status: SHIPPED | OUTPATIENT
Start: 2025-01-13 | End: 2025-01-18

## 2025-01-13 RX ORDER — CYANOCOBALAMIN 1000 UG/ML
1000 INJECTION, SOLUTION INTRAMUSCULAR; SUBCUTANEOUS ONCE
COMMUNITY
Start: 2024-12-30

## 2025-01-13 RX ORDER — DEXAMETHASONE SODIUM PHOSPHATE 4 MG/ML
4 INJECTION, SOLUTION INTRA-ARTICULAR; INTRALESIONAL; INTRAMUSCULAR; INTRAVENOUS; SOFT TISSUE
Status: COMPLETED | OUTPATIENT
Start: 2025-01-13 | End: 2025-01-13

## 2025-01-13 RX ORDER — SEMAGLUTIDE 0.5 MG/.5ML
0.5 INJECTION, SOLUTION SUBCUTANEOUS WEEKLY
COMMUNITY

## 2025-01-13 RX ADMIN — CEFTRIAXONE 1 G: 1 INJECTION, POWDER, FOR SOLUTION INTRAMUSCULAR; INTRAVENOUS at 01:01

## 2025-01-13 RX ADMIN — DEXAMETHASONE SODIUM PHOSPHATE 4 MG: 4 INJECTION, SOLUTION INTRA-ARTICULAR; INTRALESIONAL; INTRAMUSCULAR; INTRAVENOUS; SOFT TISSUE at 01:01

## 2025-01-13 NOTE — ASSESSMENT & PLAN NOTE
Rocephin IM and Decadron IM today. Azithromycin and medrol dose pack RX. Medication instructions and education given with understanding voiced. Rest, increase fluids. OTC antihistamines, decongestants, Ibuprofen, Tylenol as needed. RTC with worsening, new or persistent symptoms.

## 2025-01-13 NOTE — PROGRESS NOTES
CINDY PERLA   [facility]  [address] [address] [address]  [phone]      PATIENT NAME: Radha Crews  : 1960  DATE: 25  MRN: [id number]      Billing Provider: CINDY PERLA  Level of Service: [place] OFFICE/OUTPT VISIT, EST, LEVL IV, 30-39 MIN  Patient PCP Information       Provider PCP Type    Karuna Jara MD General            Chief Complaint   Patient presents with    Cough     Patient is Radha Crews a 64 y/o female who reports she is having a productive cough x 6 days. The cough produces green thick mucus.Patient Denies any nausea. Patient has been taking some Nyquil and Dayquil.     Nasal Congestion     Patient reports at the beginning she has some nasal drainage now she just has nasal congestion x 6 days. Patient did have some blood in her nasal passages when I went to swab her.     Fever     Patient reports she thinks she had some fever Friday night but is not sure if she has had any since and has not checked.    Generalized Body Aches     Patient reports she has body aches.     Sore Throat     Patient reports a sore throat x 6 days and is now hoarse.     Emesis     Patient reports vomiting Saturday and off and on since whenever she coughs. Patient states that the mucus coming up gags her.     Tinnitus     Patient reports ringing in her ears for a while per patient.          Medications and Allergies     Medications  Outpatient Medications Marked as Taking for the 25 encounter (Office Visit) with William Davis FNP   Medication Sig Dispense Refill    albuterol (PROVENTIL/VENTOLIN HFA) 90 mcg/actuation inhaler Inhale 2 puffs into the lungs every 6 (six) hours as needed for Wheezing. Rescue      buPROPion (WELLBUTRIN SR) 150 MG TBSR 12 hr tablet Take 150 mg by mouth once daily.      cyanocobalamin 1,000 mcg/mL injection Inject 1,000 mcg into the muscle once.      ergocalciferol (ERGOCALCIFEROL) 50,000 unit Cap Take 50,000  Units by mouth every 7 days.      EScitalopram oxalate (LEXAPRO) 10 MG tablet Take 1 tablet (10 mg total) by mouth once daily. 30 tablet 5    gabapentin (NEURONTIN) 300 MG capsule Take 1 capsule (300 mg total) by mouth 3 (three) times daily. 90 capsule 11    HYDROcodone-acetaminophen (NORCO) 7.5-325 mg per tablet Take 1 tablet by mouth daily as needed for Pain.      montelukast (SINGULAIR) 10 mg tablet Take 10 mg by mouth every evening.      omeprazole (PRILOSEC) 20 MG capsule Take 1 capsule (20 mg total) by mouth once daily. 30 capsule 5    sumatriptan (IMITREX) 100 MG tablet Take 1 tablet (100 mg total) by mouth every 2 (two) hours as needed for Migraine (do not take over 200 mg in 24 hrs). 1 tablet by mouth at onset of headache,may repeat once in 24 hours if no relief. 9 tablet 5    tiZANidine (ZANAFLEX) 4 MG tablet Take 1 tablet (4 mg total) by mouth nightly. 30 tablet 5    WEGOVY 0.5 mg/0.5 mL PnIj Inject 0.5 mg into the skin once a week.      [DISCONTINUED] WEGOVY 0.25 mg/0.5 mL PnIj Inject 0.25 mg into the skin once a week.       Current Facility-Administered Medications for the 1/13/25 encounter (Office Visit) with William Davis FNP   Medication Dose Route Frequency Provider Last Rate Last Admin    [COMPLETED] cefTRIAXone injection 1 g  1 g Intramuscular 1 time in Clinic/HOD William Davis FNP   1 g at 01/13/25 1356    [COMPLETED] dexAMETHasone injection 4 mg  4 mg Intramuscular 1 time in Clinic/HOD William Davis FNP   4 mg at 01/13/25 1356       Allergies  Review of patient's allergies indicates:  No Known Allergies    History of Present Illness    CHIEF COMPLAINT:  Patient presents today with cough, congestion, and fever.    FLU-LIKE SYMPTOMS:  She reports a severe cough causing choking episodes and sleep disturbance. Additional symptoms include congestion, fever, body aches, sore throat, and nausea with vomiting.    LABS:  Blood work is scheduled for next week. She fasts prior to blood  draws.      ROS:  General: +fever, -chills, -fatigue, -weight gain, -weight loss  Eyes: -vision changes, -redness, -discharge  ENT: -ear pain, +nasal congestion, +sore throat  Cardiovascular: -chest pain, -palpitations, -lower extremity edema  Respiratory: +cough, -shortness of breath  Gastrointestinal: -abdominal pain, +nausea, +vomiting, -diarrhea, -constipation, -blood in stool  Genitourinary: -dysuria, -hematuria, -frequency  Musculoskeletal: -joint pain, +muscle pain  Skin: -rash, -lesion  Neurological: -headache, -dizziness, -numbness, -tingling  Psychiatric: -anxiety, -depression, +sleep difficulty          Review of Systems   Constitutional:  Positive for fatigue and fever. Negative for chills.   HENT:  Positive for congestion, sinus pressure and sore throat. Negative for ear discharge, ear pain and sinus pain.    Respiratory:  Positive for cough. Negative for chest tightness, shortness of breath and wheezing.    Cardiovascular:  Negative for chest pain and palpitations.   Gastrointestinal:  Positive for vomiting. Negative for abdominal pain, constipation, diarrhea and nausea.   Genitourinary:  Negative for dysuria, flank pain and hematuria.   Musculoskeletal:  Positive for myalgias.   Neurological:  Negative for dizziness, weakness, light-headedness and headaches.   Psychiatric/Behavioral:  Negative for suicidal ideas.        Physical Exam  Vitals and nursing note reviewed.   Constitutional:       General: She is awake.      Appearance: Normal appearance.   HENT:      Head: Normocephalic.      Right Ear: Tympanic membrane, ear canal and external ear normal.      Left Ear: Tympanic membrane, ear canal and external ear normal.      Nose: Congestion present.      Right Turbinates: Swollen.      Left Turbinates: Swollen.      Right Sinus: Maxillary sinus tenderness and frontal sinus tenderness present.      Left Sinus: Maxillary sinus tenderness and frontal sinus tenderness present.      Mouth/Throat:       Lips: Pink.      Mouth: Mucous membranes are moist.      Pharynx: Oropharynx is clear. Uvula midline. Posterior oropharyngeal erythema present.   Cardiovascular:      Rate and Rhythm: Normal rate and regular rhythm.      Heart sounds: Normal heart sounds, S1 normal and S2 normal.   Pulmonary:      Effort: Pulmonary effort is normal. No respiratory distress.      Breath sounds: Normal breath sounds. No decreased breath sounds, wheezing, rhonchi or rales.   Abdominal:      General: Bowel sounds are normal.      Palpations: Abdomen is soft.      Tenderness: There is no abdominal tenderness.   Musculoskeletal:      Cervical back: Normal range of motion.   Skin:     General: Skin is warm.      Capillary Refill: Capillary refill takes less than 2 seconds.   Neurological:      Mental Status: She is alert and oriented to person, place, and time.   Psychiatric:         Thought Content: Thought content does not include homicidal or suicidal ideation. Thought content does not include homicidal or suicidal plan.         Assessment & Plan    IMPRESSION:  - Considered RSV as potential cause, though not tested  - Treating empirically with antibiotics to prevent secondary infection  - Prescribing steroids to address inflammation and drainage    VIRAL INFECTION:  - Explained that viral infections like influenza, COVID-19, and RSV must run their course.  - Assessed the condition as possibly viral, but aim to prevent secondary infection.  - Patient reports feeling unwell since Wednesday, with symptoms including coughing, congestion, and possibly a low-grade fever.  - Patient also reports myalgia, pharyngitis, and nausea when coughing.  - Physical exam revealed erythema and drainage in the oropharynx.  - Auscultation of lungs revealed clear breath sounds without wheezing.    SECONDARY INFECTION PREVENTION:  - Discussed the purpose of antibiotics in preventing secondary infections and pneumonia.  - Prescribed antibiotics to prevent  secondary infection.    INFLAMMATION AND DRAINAGE:  - Administered intramuscular steroid injection in the office.  - Prescribed oral steroids to reduce inflammation and drainage.  - Prescribed oral steroids to help reduce drainage causing the cough.    SYMPTOM MANAGEMENT:  - Recommend OTC options for symptom management: acetaminophen, cold and flu medications, Tylenol Sinus, and guaifenesin.    COUGH:  - Informed the patient about potential persistence of cough, regardless of underlying cause.  - Prescribed antitussive medication for symptomatic relief, especially for nocturnal use.  - Patient reports coughing, which is causing sleep disturbance.  - Noted clear breath sounds without wheezing, indicating the cough is likely due to upper respiratory issues.  - Assessed that the cough is likely caused by postnasal drip from upper respiratory infection.    FOLLOW UP:  - Advised the patient to follow up if symptoms persist or worsen by Thursday or Friday.           Health Maintenance Due   Topic Date Due    Hepatitis C Screening  Never done    HIV Screening  Never done    Hemoglobin A1c (Diabetic Prevention Screening)  Never done    Shingles Vaccine (1 of 2) Never done    Pneumococcal Vaccines (Age 50+) (2 of 2 - PPSV23) 10/09/2016    Cervical Cancer Screening  04/11/2021    Mammogram  10/07/2021    Colorectal Cancer Screening  10/05/2023    Influenza Vaccine (1) 09/01/2024    COVID-19 Vaccine (1 - 2024-25 season) Never done       Problem List Items Addressed This Visit       Acute cough    Current Assessment & Plan     Rapid covid, flu and strep negative today         Relevant Orders    POCT COVID-19 Rapid Screening (Completed)    POCT Influenza A/B Molecular (Completed)    POCT Strep A, Molecular (Completed)    Acute pansinusitis - Primary    Current Assessment & Plan     Rocephin IM and Decadron IM today. Azithromycin and medrol dose pack RX. Medication instructions and education given with understanding voiced. Rest,  increase fluids. OTC antihistamines, decongestants, Ibuprofen, Tylenol as needed. RTC with worsening, new or persistent symptoms.           Relevant Medications    cefTRIAXone injection 1 g (Completed)    dexAMETHasone injection 4 mg (Completed)    azithromycin (Z-YASMEEN) 250 MG tablet    methylPREDNISolone (MEDROL DOSEPACK) 4 mg tablet    dexchlorphen-phenylephrine-DM (POLYTUSSIN DM,DEXCHLORPHENRMN,) 1-5-10 mg/5 mL Syrp     Other Visit Diagnoses       Fever, unspecified fever cause        Relevant Orders    POCT COVID-19 Rapid Screening (Completed)    POCT Influenza A/B Molecular (Completed)    POCT Strep A, Molecular (Completed)    Sore throat        Relevant Orders    POCT COVID-19 Rapid Screening (Completed)    POCT Influenza A/B Molecular (Completed)    POCT Strep A, Molecular (Completed)    Nasal congestion        Relevant Orders    POCT COVID-19 Rapid Screening (Completed)    POCT Influenza A/B Molecular (Completed)    POCT Strep A, Molecular (Completed)    Generalized body aches        Relevant Orders    POCT COVID-19 Rapid Screening (Completed)    POCT Influenza A/B Molecular (Completed)    POCT Strep A, Molecular (Completed)            Health Maintenance Topics with due status: Not Due       Topic Last Completion Date    TETANUS VACCINE 07/09/2021    Lipid Panel 05/19/2022    RSV Vaccine (Age 60+ and Pregnant patients) Not Due       No future appointments.     This note was generated with the assistance of ambient listening technology. Verbal consent was obtained by the patient and accompanying visitor(s) for the recording of patient appointment to facilitate this note. I attest to having reviewed and edited the generated note for accuracy, though some syntax or spelling errors may persist. Please contact the author of this note for any clarification.     Signature:  CINDY PERLA  [facility]  [address] [address] [address]  [phone]    Date of encounter:  1/13/25

## (undated) DEVICE — CDS ANGIOGRAPHY PACK

## (undated) DEVICE — CATH IMPULSE 6FR FL3.5

## (undated) DEVICE — SUT 2-0 VICRYL / CT-1

## (undated) DEVICE — GLOVE BIOGEL SKINSENSE PI 6.5

## (undated) DEVICE — POSITIONER ULNAR NERVE

## (undated) DEVICE — SPONGE COTTON WOVEN 4X4IN

## (undated) DEVICE — Device

## (undated) DEVICE — TROCAR GELPORT BLUNT BALLOON SYSTEM 10/12X100MM LF

## (undated) DEVICE — GLOVE SURGICAL PROTEXIS PI SIZE 6

## (undated) DEVICE — BAG RECTANGLE RBBRBND 30X36IN

## (undated) DEVICE — DRESSING XEROFORM FOIL PK 1X8

## (undated) DEVICE — GLOVE 7.5 PROTEXIS PI BLUE

## (undated) DEVICE — SYSTEM BACK STOP CLOSED WASTE

## (undated) DEVICE — APPLICATOR BD CHLORAPREP FREPP CLEAR STERILE 1.5ML

## (undated) DEVICE — CDS LAP CHOLE

## (undated) DEVICE — DRAPE U SPLIT SHEET 54X76IN

## (undated) DEVICE — GLIDESHEATH SLENDER INTRODUCER 6FR

## (undated) DEVICE — TOURNIQUET SB QC DP 24X4IN

## (undated) DEVICE — BAG-A-JET FLUID DISPENSER SYSTEM

## (undated) DEVICE — SLING ARM LARGE FOAM STRAP

## (undated) DEVICE — SOL IRRIGATION SALINE 0.9% 1000ML BOTTLE

## (undated) DEVICE — DEVICE RADIAL TR BAND REG

## (undated) DEVICE — APPLICATOR CHLORAPREP LITE ORANGE 10.5ML STERILE

## (undated) DEVICE — STOPCOCK 3-WAY ROTATING

## (undated) DEVICE — DRAPE FLUORO C ARM 36X30IN

## (undated) DEVICE — TROCAR SLEEVE Z-THREAD 5MM X 100MM

## (undated) DEVICE — SUTURE SILK 2-0 FS 18 BLACK

## (undated) DEVICE — BIT DRILL QC 1.8X110MM

## (undated) DEVICE — SOL IRRIGATION SALINE 3000ML BAG

## (undated) DEVICE — GLOVE SURGICAL PROTEXIS PI BLUE SIZE 6.5

## (undated) DEVICE — DRAIN SURGICAL J/P FLAT HUBLESS 10MM X 20CM

## (undated) DEVICE — LAPARSCOPIC L-HOOK WIRE

## (undated) DEVICE — SENSOR PULSE OX ADULT

## (undated) DEVICE — SPONGE XRAY DETECT 4X4IN PK/10

## (undated) DEVICE — CAST SMALL OR FROM CAST CART

## (undated) DEVICE — WARMER SCOPE DISP

## (undated) DEVICE — BOWL PLASTIC 32OZ STERILE BLUE

## (undated) DEVICE — SET IV PRIMARY ALARIS (PRIMARY)

## (undated) DEVICE — TOWEL OR STERILE BLUE 4/PK 20PK/CS

## (undated) DEVICE — DRAPE INVISISHIELD U 48X52IN

## (undated) DEVICE — CLIP APPLIER LIGSMAX MULTI 5MM

## (undated) DEVICE — DRAPE THREE-QTR REINF 53X77IN

## (undated) DEVICE — GLIDESHEATH SLENDER INTRODUCER 5FR

## (undated) DEVICE — SET IV EXTENSION 42IN W/2 PORT CLEARLINK

## (undated) DEVICE — UNDERGLOVE BIOGEL PI SZ 5.5

## (undated) DEVICE — ISOVUE 370 100ML

## (undated) DEVICE — GLOVE 6.0 PROTEXIS PI BLUE

## (undated) DEVICE — GLOVE 7.0 PROTEXIS PI BLUE

## (undated) DEVICE — GLOVE SURG BIOGEL SZ 7.5

## (undated) DEVICE — DECANTER FLUID TRNSF WHITE 9IN

## (undated) DEVICE — CATH CHOLANGIOGRAM 4.5FR 26CM W/PERC INTRODUCER

## (undated) DEVICE — TROCAR BLADELESS Z-THREAD 5MM X 100MM

## (undated) DEVICE — SUT ETHILON 3-0 PS2 18 BLK

## (undated) DEVICE — KIT ANGIO MANIFOLD CUSTOM RFH LEFT HEART KIT

## (undated) DEVICE — SHEET HIP ABSORB CIRC ELAS 8

## (undated) DEVICE — DRESSING IV TEGADERM 10X12CM

## (undated) DEVICE — GLOVE SURGICAL PROTEXIS PI CLASSIC SIZE 6.5

## (undated) DEVICE — GLOVE BIOGEL SKINSENSE PI 7.0

## (undated) DEVICE — GLOVE SURGICAL PROTEXIS PI BLUE SIZE 7.0

## (undated) DEVICE — GLOVE SURGICAL PROTEXIS PI BLUE SIZE 6.0

## (undated) DEVICE — SUTURE PDS II 0 VIOLET 27 CT2

## (undated) DEVICE — GLOVE SURGICAL PROTEXIS PI SIZE 6.5

## (undated) DEVICE — INSERT CARTRIDGE 5X34CM DISP F/SCISSOR

## (undated) DEVICE — GOWN NONREINF SET-IN SLV 2XL

## (undated) DEVICE — SUTURE VICRYL 4-0 FS2 UNDYED 27 IN

## (undated) DEVICE — BIT DRILL 2.0.

## (undated) DEVICE — CATH DIAG OPTITORQUE 5FR TIGER 110CM

## (undated) DEVICE — GLOVE SURGICAL PROTEXIS PI SIZE 7

## (undated) DEVICE — ENDO POUCH

## (undated) DEVICE — TUBING CAPNOLINE PLUS SMART 02 CONNECTOR(ORDER 65110)

## (undated) DEVICE — DRAPE BRACHIAL ANGIOGRAPHY TIBURON

## (undated) DEVICE — BULB RESERVOIR J/P 100CC

## (undated) DEVICE — APPLICATOR CHLORAPREP ORN 26ML

## (undated) DEVICE — MONOPOLAR FOOT SWITCHING LAP CHORD

## (undated) DEVICE — IRRIGATOR SUCTION STYKERFLOW II DISP

## (undated) DEVICE — GLOVE BIOGEL SKINSENSE PI 7.5

## (undated) DEVICE — GLOVE SURGICAL PROTEXIS PI SIZE 8.0

## (undated) DEVICE — SODIUM CHLORIDE 0.9% 1000ML

## (undated) DEVICE — PAD CAST SPECIALIST STRL 4

## (undated) DEVICE — GOWN SURGICAL SMARTGOWN LEVEL 4 / EXTRA LARGE STERILE

## (undated) DEVICE — CATH DIAG OPTITORQUE 6.5FR JACKY 3.5

## (undated) DEVICE — STRIP CLOSURE SKIN 1/2 X 4 IN

## (undated) DEVICE — GUIDEWIRE FLOPPY WHOLEY 260 CM EXCHANGE

## (undated) DEVICE — CATH IMPULSE 5FR FR4

## (undated) DEVICE — SURGICEL HEMOSTATIC 4X8 OXIDIZED CELLULOSE ABSORBABLE SHEET